# Patient Record
Sex: FEMALE | Race: WHITE | NOT HISPANIC OR LATINO | Employment: OTHER | URBAN - METROPOLITAN AREA
[De-identification: names, ages, dates, MRNs, and addresses within clinical notes are randomized per-mention and may not be internally consistent; named-entity substitution may affect disease eponyms.]

---

## 2018-03-28 LAB
ABSOL LYMPHOCYTES (HISTORICAL): 1.1 K/UL (ref 0.5–4)
ALBUMIN SERPL BCP-MCNC: 4 G/DL (ref 3–5.2)
ALP SERPL-CCNC: 71 U/L (ref 43–122)
ALT SERPL W P-5'-P-CCNC: 22 U/L (ref 9–52)
AMORPHOUS MATERIAL (HISTORICAL): ABNORMAL
ANION GAP SERPL CALCULATED.3IONS-SCNC: 16 MMOL/L (ref 5–14)
AST SERPL W P-5'-P-CCNC: 24 U/L (ref 14–36)
BACTERIA UR QL AUTO: ABNORMAL
BASOPHILS # BLD AUTO: 0 % (ref 0–1)
BASOPHILS # BLD AUTO: 0 K/UL (ref 0–0.1)
BILIRUB SERPL-MCNC: 1.5 MG/DL
BILIRUB UR QL STRIP: NEGATIVE MG/DL
BUN SERPL-MCNC: 17 MG/DL (ref 5–25)
CALCIUM SERPL-MCNC: 9.8 MG/DL (ref 8.4–10.2)
CASTS/CASTS TYPE (HISTORICAL): ABNORMAL /LPF
CHLORIDE SERPL-SCNC: 104 MEQ/L (ref 97–108)
CK SERPL-CCNC: 116 U/L (ref 30–135)
CK SERPL-CCNC: 117 U/L (ref 30–135)
CK SERPL-CCNC: 135 U/L (ref 30–135)
CLARITY UR: CLEAR
CO2 SERPL-SCNC: 19 MMOL/L (ref 22–30)
COLOR UR: YELLOW
CREATINE KINASE-MB FRACTION (HISTORICAL): 3.71 NG/ML (ref 0–2.37)
CREATINE KINASE-MB FRACTION (HISTORICAL): 3.83 NG/ML (ref 0–2.37)
CREATINE, SERUM (HISTORICAL): 0.67 MG/DL (ref 0.6–1.2)
CRYSTAL TYPE (HISTORICAL): ABNORMAL /HPF
DEPRECATED RDW RBC AUTO: 13.9 %
EGFR (HISTORICAL): >60 ML/MIN/1.73 M2
EOSINOPHIL # BLD AUTO: 0 K/UL (ref 0–0.4)
EOSINOPHIL NFR BLD AUTO: 0 % (ref 0–6)
GLUCOSE SERPL-MCNC: 95 MG/DL (ref 70–99)
GLUCOSE UR STRIP-MCNC: NEGATIVE MG/DL
HCT VFR BLD AUTO: 39.6 % (ref 36–46)
HGB BLD-MCNC: 13.8 G/DL (ref 12–16)
HGB UR QL STRIP.AUTO: NEGATIVE
KETONES UR STRIP-MCNC: >=150 MG/DL
LACTATE SERPL-SCNC: 1 MMOL/L (ref 0.7–2.1)
LEUKOCYTE ESTERASE UR QL STRIP: ABNORMAL
LIPASE SERPL-CCNC: 17 U/L (ref 23–300)
LYMPHOCYTES NFR BLD AUTO: 10 % (ref 25–45)
MCH RBC QN AUTO: 32.7 PG (ref 26–34)
MCHC RBC AUTO-ENTMCNC: 34.8 % (ref 31–36)
MCV RBC AUTO: 94 FL (ref 80–100)
MONOCYTES # BLD AUTO: 0.9 K/UL (ref 0.2–0.9)
MONOCYTES NFR BLD AUTO: 8 % (ref 1–10)
MUCOUS THREADS URNS QL MICRO: ABNORMAL
NEUTROPHILS ABS COUNT (HISTORICAL): 9.6 K/UL (ref 1.8–7.8)
NEUTS SEG NFR BLD AUTO: 82 % (ref 45–65)
NITRITE UR QL STRIP: NEGATIVE
NON-SQ EPI CELLS URNS QL MICRO: ABNORMAL
OTHER STN SPEC: ABNORMAL
PH UR STRIP.AUTO: 6 [PH] (ref 4.5–8)
PLATELET # BLD AUTO: 260 K/MCL (ref 150–450)
POTASSIUM SERPL-SCNC: 3.9 MEQ/L (ref 3.6–5)
PROT UR STRIP-MCNC: 15 MG/DL
RBC # BLD AUTO: 4.21 M/MCL (ref 4–5.2)
RBC #/AREA URNS AUTO: ABNORMAL /HPF
SODIUM SERPL-SCNC: 140 MEQ/L (ref 137–147)
SP GR UR STRIP.AUTO: 1.03 (ref 1–1.04)
TOTAL PROTEIN (HISTORICAL): 7 G/DL (ref 5.9–8.4)
TROPONIN I SERPL-MCNC: 0.02 NG/ML (ref 0–0.03)
TROPONIN I SERPL-MCNC: 0.03 NG/ML (ref 0–0.03)
UROBILINOGEN UR QL STRIP.AUTO: NEGATIVE MG/DL (ref 0–1)
WBC # BLD AUTO: 11.7 K/MCL (ref 4.5–11)
WBC #/AREA URNS AUTO: ABNORMAL /HPF

## 2018-03-29 LAB
ABSOL LYMPHOCYTES (HISTORICAL): 0.9 K/UL (ref 0.5–4)
ANION GAP SERPL CALCULATED.3IONS-SCNC: 9 MMOL/L (ref 5–14)
BASOPHILS # BLD AUTO: 0 % (ref 0–1)
BASOPHILS # BLD AUTO: 0 K/UL (ref 0–0.1)
BUN SERPL-MCNC: 11 MG/DL (ref 5–25)
CALCIUM SERPL-MCNC: 8.6 MG/DL (ref 8.4–10.2)
CHLORIDE SERPL-SCNC: 108 MEQ/L (ref 97–108)
CO2 SERPL-SCNC: 21 MMOL/L (ref 22–30)
CREATINE, SERUM (HISTORICAL): 0.59 MG/DL (ref 0.6–1.2)
DEPRECATED RDW RBC AUTO: 13.9 %
EGFR (HISTORICAL): >60 ML/MIN/1.73 M2
EOSINOPHIL # BLD AUTO: 0.1 K/UL (ref 0–0.4)
EOSINOPHIL NFR BLD AUTO: 2 % (ref 0–6)
GLUCOSE SERPL-MCNC: 81 MG/DL (ref 70–99)
HCT VFR BLD AUTO: 32.1 % (ref 36–46)
HGB BLD-MCNC: 11.1 G/DL (ref 12–16)
LYMPHOCYTES NFR BLD AUTO: 14 % (ref 25–45)
MAGNESIUM SERPL-MCNC: 2 MG/DL (ref 1.6–2.3)
MCH RBC QN AUTO: 32.7 PG (ref 26–34)
MCHC RBC AUTO-ENTMCNC: 34.6 % (ref 31–36)
MCV RBC AUTO: 95 FL (ref 80–100)
MONOCYTES # BLD AUTO: 0.7 K/UL (ref 0.2–0.9)
MONOCYTES NFR BLD AUTO: 11 % (ref 1–10)
NEUTROPHILS ABS COUNT (HISTORICAL): 4.7 K/UL (ref 1.8–7.8)
NEUTS SEG NFR BLD AUTO: 73 % (ref 45–65)
PLATELET # BLD AUTO: 176 K/MCL (ref 150–450)
POTASSIUM SERPL-SCNC: 3.4 MEQ/L (ref 3.6–5)
RBC # BLD AUTO: 3.4 M/MCL (ref 4–5.2)
SODIUM SERPL-SCNC: 138 MEQ/L (ref 137–147)
WBC # BLD AUTO: 6.4 K/MCL (ref 4.5–11)

## 2018-03-30 LAB
ABSOL LYMPHOCYTES (HISTORICAL): 1 K/UL (ref 0.5–4)
ANION GAP SERPL CALCULATED.3IONS-SCNC: 7 MMOL/L (ref 5–14)
BASOPHILS # BLD AUTO: 0 K/UL (ref 0–0.1)
BASOPHILS # BLD AUTO: 1 % (ref 0–1)
BUN SERPL-MCNC: 11 MG/DL (ref 5–25)
CALCIUM SERPL-MCNC: 9 MG/DL (ref 8.4–10.2)
CHLORIDE SERPL-SCNC: 107 MEQ/L (ref 97–108)
CHOLEST SERPL-MCNC: 122 MG/DL
CHOLEST/HDLC SERPL: 2.8 {RATIO}
CO2 SERPL-SCNC: 25 MMOL/L (ref 22–30)
CREATINE, SERUM (HISTORICAL): 0.65 MG/DL (ref 0.6–1.2)
DEPRECATED RDW RBC AUTO: 14.4 %
EGFR (HISTORICAL): >60 ML/MIN/1.73 M2
EOSINOPHIL # BLD AUTO: 0.2 K/UL (ref 0–0.4)
EOSINOPHIL NFR BLD AUTO: 2 % (ref 0–6)
GLUCOSE FASTING (HISTORICAL): 131 MG/DL (ref 70–99)
HCT VFR BLD AUTO: 32.4 % (ref 36–46)
HDLC SERPL-MCNC: 44 MG/DL
HGB BLD-MCNC: 11.3 G/DL (ref 12–16)
LDL/HDL RATIO (HISTORICAL): 1.4
LDLC SERPL CALC-MCNC: 61 MG/DL
LYMPHOCYTES NFR BLD AUTO: 15 % (ref 25–45)
MCH RBC QN AUTO: 32.7 PG (ref 26–34)
MCHC RBC AUTO-ENTMCNC: 35 % (ref 31–36)
MCV RBC AUTO: 94 FL (ref 80–100)
MONOCYTES # BLD AUTO: 0.7 K/UL (ref 0.2–0.9)
MONOCYTES NFR BLD AUTO: 11 % (ref 1–10)
NEUTROPHILS ABS COUNT (HISTORICAL): 4.7 K/UL (ref 1.8–7.8)
NEUTS SEG NFR BLD AUTO: 71 % (ref 45–65)
PLATELET # BLD AUTO: 186 K/MCL (ref 150–450)
POTASSIUM SERPL-SCNC: 4.2 MEQ/L (ref 3.6–5)
RBC # BLD AUTO: 3.46 M/MCL (ref 4–5.2)
SODIUM SERPL-SCNC: 139 MEQ/L (ref 137–147)
TRIGL SERPL-MCNC: 87 MG/DL
VLDLC SERPL CALC-MCNC: 17 MG/DL (ref 0–40)
WBC # BLD AUTO: 6.7 K/MCL (ref 4.5–11)

## 2018-03-31 LAB
ABSOL LYMPHOCYTES (HISTORICAL): 1.1 K/UL (ref 0.5–4)
ALBUMIN SERPL BCP-MCNC: 3.1 G/DL (ref 3–5.2)
ALP SERPL-CCNC: 51 U/L (ref 43–122)
ALT SERPL W P-5'-P-CCNC: 28 U/L (ref 9–52)
ANION GAP SERPL CALCULATED.3IONS-SCNC: 8 MMOL/L (ref 5–14)
AST SERPL W P-5'-P-CCNC: 18 U/L (ref 14–36)
BASOPHILS # BLD AUTO: 0 K/UL (ref 0–0.1)
BASOPHILS # BLD AUTO: 1 % (ref 0–1)
BILIRUB SERPL-MCNC: 0.2 MG/DL
BUN SERPL-MCNC: 13 MG/DL (ref 5–25)
CALCIUM SERPL-MCNC: 8.9 MG/DL (ref 8.4–10.2)
CHLORIDE SERPL-SCNC: 105 MEQ/L (ref 97–108)
CO2 SERPL-SCNC: 27 MMOL/L (ref 22–30)
CREATINE, SERUM (HISTORICAL): 0.53 MG/DL (ref 0.6–1.2)
DEPRECATED RDW RBC AUTO: 13.8 %
EGFR (HISTORICAL): >60 ML/MIN/1.73 M2
EOSINOPHIL # BLD AUTO: 0.1 K/UL (ref 0–0.4)
EOSINOPHIL NFR BLD AUTO: 2 % (ref 0–6)
GLUCOSE SERPL-MCNC: 121 MG/DL (ref 70–99)
HCT VFR BLD AUTO: 33.5 % (ref 36–46)
HGB BLD-MCNC: 11.6 G/DL (ref 12–16)
LYMPHOCYTES NFR BLD AUTO: 24 % (ref 25–45)
MCH RBC QN AUTO: 32.6 PG (ref 26–34)
MCHC RBC AUTO-ENTMCNC: 34.8 % (ref 31–36)
MCV RBC AUTO: 94 FL (ref 80–100)
MONOCYTES # BLD AUTO: 0.5 K/UL (ref 0.2–0.9)
MONOCYTES NFR BLD AUTO: 10 % (ref 1–10)
NEUTROPHILS ABS COUNT (HISTORICAL): 3 K/UL (ref 1.8–7.8)
NEUTS SEG NFR BLD AUTO: 63 % (ref 45–65)
PLATELET # BLD AUTO: 208 K/MCL (ref 150–450)
POTASSIUM SERPL-SCNC: 4 MEQ/L (ref 3.6–5)
RBC # BLD AUTO: 3.57 M/MCL (ref 4–5.2)
SODIUM SERPL-SCNC: 140 MEQ/L (ref 137–147)
TOTAL PROTEIN (HISTORICAL): 5.7 G/DL (ref 5.9–8.4)
WBC # BLD AUTO: 4.7 K/MCL (ref 4.5–11)

## 2018-07-09 ENCOUNTER — TRANSCRIBE ORDERS (OUTPATIENT)
Dept: ADMINISTRATIVE | Facility: HOSPITAL | Age: 83
End: 2018-07-09

## 2018-07-09 DIAGNOSIS — R00.1 BRADYCARDIA: Primary | ICD-10-CM

## 2018-07-09 DIAGNOSIS — M25.562 LEFT KNEE PAIN, UNSPECIFIED CHRONICITY: ICD-10-CM

## 2018-07-09 DIAGNOSIS — R79.89 LOW VITAMIN D LEVEL: Primary | ICD-10-CM

## 2018-07-09 DIAGNOSIS — M25.561 RIGHT KNEE PAIN, UNSPECIFIED CHRONICITY: ICD-10-CM

## 2018-07-09 RX ORDER — CHOLECALCIFEROL (VITAMIN D3) 25 MCG
CAPSULE ORAL
Qty: 30 CAPSULE | Refills: 2 | Status: SHIPPED | OUTPATIENT
Start: 2018-07-09 | End: 2018-10-10 | Stop reason: SDUPTHER

## 2018-07-16 ENCOUNTER — HOSPITAL ENCOUNTER (OUTPATIENT)
Dept: NON INVASIVE DIAGNOSTICS | Facility: HOSPITAL | Age: 83
Discharge: HOME | End: 2018-07-16
Payer: MEDICARE

## 2018-07-16 DIAGNOSIS — R00.1 BRADYCARDIA: ICD-10-CM

## 2018-07-16 PROCEDURE — 93225 XTRNL ECG REC<48 HRS REC: CPT

## 2018-07-16 PROCEDURE — 93226 XTRNL ECG REC<48 HR SCAN A/R: CPT

## 2018-07-20 ENCOUNTER — HOSPITAL ENCOUNTER (OUTPATIENT)
Dept: RADIOLOGY | Facility: HOSPITAL | Age: 83
Discharge: HOME | End: 2018-07-20
Payer: MEDICARE

## 2018-07-20 DIAGNOSIS — M25.561 RIGHT KNEE PAIN, UNSPECIFIED CHRONICITY: ICD-10-CM

## 2018-07-20 DIAGNOSIS — R00.1 BRADYCARDIA: ICD-10-CM

## 2018-07-20 DIAGNOSIS — M25.562 LEFT KNEE PAIN, UNSPECIFIED CHRONICITY: ICD-10-CM

## 2018-07-20 PROCEDURE — 73562 X-RAY EXAM OF KNEE 3: CPT

## 2018-07-20 PROCEDURE — 72110 X-RAY EXAM L-2 SPINE 4/>VWS: CPT

## 2018-07-20 PROCEDURE — 93227 XTRNL ECG REC<48 HR R&I: CPT | Performed by: INTERNAL MEDICINE

## 2018-07-23 ENCOUNTER — TELEPHONE (OUTPATIENT)
Dept: FAMILY MEDICINE CLINIC | Facility: CLINIC | Age: 83
End: 2018-07-23

## 2018-07-23 DIAGNOSIS — R00.1 BRADYCARDIA, SEVERE SINUS: Primary | ICD-10-CM

## 2018-10-10 DIAGNOSIS — R79.89 LOW VITAMIN D LEVEL: ICD-10-CM

## 2018-10-10 RX ORDER — CHOLECALCIFEROL (VITAMIN D3) 25 MCG
CAPSULE ORAL
Qty: 30 CAPSULE | Refills: 2 | Status: SHIPPED | OUTPATIENT
Start: 2018-10-10 | End: 2019-01-16 | Stop reason: SDUPTHER

## 2018-10-18 DIAGNOSIS — R10.13 EPIGASTRIC PAIN: Primary | ICD-10-CM

## 2018-10-18 RX ORDER — PANTOPRAZOLE SODIUM 40 MG/1
TABLET, DELAYED RELEASE ORAL
COMMUNITY
Start: 2018-01-18 | End: 2018-10-18 | Stop reason: SDUPTHER

## 2018-10-18 RX ORDER — PANTOPRAZOLE SODIUM 40 MG/1
40 TABLET, DELAYED RELEASE ORAL DAILY
Qty: 90 TABLET | Refills: 1 | Status: SHIPPED | OUTPATIENT
Start: 2018-10-18 | End: 2018-12-13 | Stop reason: HOSPADM

## 2018-10-19 DIAGNOSIS — I10 ESSENTIAL HYPERTENSION: Primary | ICD-10-CM

## 2018-10-19 RX ORDER — AMLODIPINE BESYLATE 2.5 MG/1
2.5 TABLET ORAL DAILY
Refills: 3 | COMMUNITY
Start: 2018-09-10 | End: 2018-10-19 | Stop reason: SDUPTHER

## 2018-10-19 RX ORDER — AMLODIPINE BESYLATE 2.5 MG/1
2.5 TABLET ORAL DAILY
Qty: 90 TABLET | Refills: 1 | Status: SHIPPED | OUTPATIENT
Start: 2018-10-19 | End: 2018-12-13 | Stop reason: HOSPADM

## 2018-12-10 ENCOUNTER — TELEPHONE (OUTPATIENT)
Dept: OTHER | Facility: OTHER | Age: 83
End: 2018-12-10

## 2018-12-10 ENCOUNTER — HOSPITAL ENCOUNTER (INPATIENT)
Facility: HOSPITAL | Age: 83
LOS: 2 days | DRG: 069 | End: 2018-12-13
Attending: EMERGENCY MEDICINE | Admitting: INTERNAL MEDICINE
Payer: MEDICARE

## 2018-12-10 ENCOUNTER — APPOINTMENT (EMERGENCY)
Dept: CT IMAGING | Facility: HOSPITAL | Age: 83
DRG: 069 | End: 2018-12-10
Payer: MEDICARE

## 2018-12-10 DIAGNOSIS — I16.0 HYPERTENSIVE URGENCY: ICD-10-CM

## 2018-12-10 DIAGNOSIS — K21.9 GASTROESOPHAGEAL REFLUX DISEASE, ESOPHAGITIS PRESENCE NOT SPECIFIED: ICD-10-CM

## 2018-12-10 DIAGNOSIS — G45.9 TRANSIENT ISCHEMIC ATTACK (TIA): ICD-10-CM

## 2018-12-10 DIAGNOSIS — R47.01 APHASIA: ICD-10-CM

## 2018-12-10 DIAGNOSIS — R41.0 CONFUSION: Primary | ICD-10-CM

## 2018-12-10 DIAGNOSIS — G45.9 TRANSIENT CEREBRAL ISCHEMIA, UNSPECIFIED TYPE: ICD-10-CM

## 2018-12-10 LAB
ALBUMIN SERPL BCP-MCNC: 4.6 G/DL (ref 3–5.2)
ALP SERPL-CCNC: 74 U/L (ref 43–122)
ALT SERPL W P-5'-P-CCNC: 19 U/L (ref 9–52)
AMPHETAMINES SERPL QL SCN: NEGATIVE
ANION GAP SERPL CALCULATED.3IONS-SCNC: 11 MMOL/L (ref 5–14)
APTT PPP: 23 SECONDS (ref 23–34)
AST SERPL W P-5'-P-CCNC: 18 U/L (ref 14–36)
ATRIAL RATE: 76 BPM
BARBITURATES UR QL: NEGATIVE
BASOPHILS # BLD AUTO: 0 THOUSANDS/ΜL (ref 0–0.1)
BASOPHILS NFR BLD AUTO: 0 % (ref 0–1)
BENZODIAZ UR QL: NEGATIVE
BILIRUB SERPL-MCNC: 0.4 MG/DL
BILIRUB UR QL STRIP: NEGATIVE
BUN SERPL-MCNC: 12 MG/DL (ref 5–25)
CALCIUM SERPL-MCNC: 9.8 MG/DL (ref 8.4–10.2)
CHLORIDE SERPL-SCNC: 103 MMOL/L (ref 97–108)
CLARITY UR: CLEAR
CO2 SERPL-SCNC: 26 MMOL/L (ref 22–30)
COCAINE UR QL: NEGATIVE
COLOR UR: ABNORMAL
CREAT SERPL-MCNC: 0.66 MG/DL (ref 0.6–1.2)
EOSINOPHIL # BLD AUTO: 0 THOUSAND/ΜL (ref 0–0.4)
EOSINOPHIL NFR BLD AUTO: 0 % (ref 0–6)
ERYTHROCYTE [DISTWIDTH] IN BLOOD BY AUTOMATED COUNT: 13.7 %
ETHANOL SERPL-MCNC: <10 MG/DL (ref 0–10)
GFR SERPL CREATININE-BSD FRML MDRD: 79 ML/MIN/1.73SQ M
GLUCOSE SERPL-MCNC: 136 MG/DL (ref 70–99)
GLUCOSE UR STRIP-MCNC: ABNORMAL MG/DL
HCT VFR BLD AUTO: 46.2 % (ref 36–46)
HGB BLD-MCNC: 15.4 G/DL (ref 12–16)
HGB UR QL STRIP.AUTO: NEGATIVE
INR PPP: 1.04 (ref 0.89–1.1)
KETONES UR STRIP-MCNC: NEGATIVE MG/DL
LACTATE SERPL-SCNC: 2 MMOL/L (ref 0.7–2)
LEUKOCYTE ESTERASE UR QL STRIP: NEGATIVE
LYMPHOCYTES # BLD AUTO: 1.3 THOUSANDS/ΜL (ref 0.5–4)
LYMPHOCYTES NFR BLD AUTO: 17 % (ref 25–45)
MCH RBC QN AUTO: 32.7 PG (ref 26.8–34.3)
MCHC RBC AUTO-ENTMCNC: 33.4 G/DL (ref 31.4–37.4)
MCV RBC AUTO: 98 FL (ref 82–98)
METHADONE UR QL: NEGATIVE
MONOCYTES # BLD AUTO: 0.4 THOUSAND/ΜL (ref 0.2–0.9)
MONOCYTES NFR BLD AUTO: 6 % (ref 1–10)
NEUTROPHILS # BLD AUTO: 6.1 THOUSANDS/ΜL (ref 1.8–7.8)
NEUTS SEG NFR BLD AUTO: 77 % (ref 45–65)
NITRITE UR QL STRIP: NEGATIVE
NT-PROBNP SERPL-MCNC: 400 PG/ML (ref 0–299)
OPIATES UR QL SCN: NEGATIVE
P AXIS: 77 DEGREES
PCP UR QL: NEGATIVE
PH UR STRIP.AUTO: 6.5 [PH] (ref 4.5–8)
PLATELET # BLD AUTO: 276 THOUSANDS/UL (ref 150–450)
PMV BLD AUTO: 7 FL (ref 8.9–12.7)
POTASSIUM SERPL-SCNC: 3.5 MMOL/L (ref 3.6–5)
PR INTERVAL: 154 MS
PROT SERPL-MCNC: 8.2 G/DL (ref 5.9–8.4)
PROT UR STRIP-MCNC: NEGATIVE MG/DL
PROTHROMBIN TIME: 11 SECONDS (ref 9.5–11.6)
QRS AXIS: 8 DEGREES
QRSD INTERVAL: 84 MS
QT INTERVAL: 380 MS
QTC INTERVAL: 427 MS
RBC # BLD AUTO: 4.71 MILLION/UL (ref 4–5.2)
SODIUM SERPL-SCNC: 140 MMOL/L (ref 137–147)
SP GR UR STRIP.AUTO: 1.01 (ref 1–1.04)
T WAVE AXIS: 58 DEGREES
THC UR QL: NEGATIVE
TROPONIN I SERPL-MCNC: <0.01 NG/ML (ref 0–0.03)
UROBILINOGEN UA: NEGATIVE MG/DL
VENTRICULAR RATE: 76 BPM
WBC # BLD AUTO: 7.9 THOUSAND/UL (ref 4.31–10.16)

## 2018-12-10 PROCEDURE — 83880 ASSAY OF NATRIURETIC PEPTIDE: CPT | Performed by: EMERGENCY MEDICINE

## 2018-12-10 PROCEDURE — 85730 THROMBOPLASTIN TIME PARTIAL: CPT | Performed by: EMERGENCY MEDICINE

## 2018-12-10 PROCEDURE — 80320 DRUG SCREEN QUANTALCOHOLS: CPT | Performed by: EMERGENCY MEDICINE

## 2018-12-10 PROCEDURE — 93010 ELECTROCARDIOGRAM REPORT: CPT | Performed by: INTERNAL MEDICINE

## 2018-12-10 PROCEDURE — 85610 PROTHROMBIN TIME: CPT | Performed by: EMERGENCY MEDICINE

## 2018-12-10 PROCEDURE — 80307 DRUG TEST PRSMV CHEM ANLYZR: CPT | Performed by: EMERGENCY MEDICINE

## 2018-12-10 PROCEDURE — 83605 ASSAY OF LACTIC ACID: CPT | Performed by: EMERGENCY MEDICINE

## 2018-12-10 PROCEDURE — 96360 HYDRATION IV INFUSION INIT: CPT

## 2018-12-10 PROCEDURE — 99285 EMERGENCY DEPT VISIT HI MDM: CPT

## 2018-12-10 PROCEDURE — 99222 1ST HOSP IP/OBS MODERATE 55: CPT | Performed by: INTERNAL MEDICINE

## 2018-12-10 PROCEDURE — 84484 ASSAY OF TROPONIN QUANT: CPT | Performed by: EMERGENCY MEDICINE

## 2018-12-10 PROCEDURE — 93005 ELECTROCARDIOGRAM TRACING: CPT

## 2018-12-10 PROCEDURE — 80053 COMPREHEN METABOLIC PANEL: CPT | Performed by: EMERGENCY MEDICINE

## 2018-12-10 PROCEDURE — 70450 CT HEAD/BRAIN W/O DYE: CPT

## 2018-12-10 PROCEDURE — 85025 COMPLETE CBC W/AUTO DIFF WBC: CPT | Performed by: EMERGENCY MEDICINE

## 2018-12-10 PROCEDURE — 36415 COLL VENOUS BLD VENIPUNCTURE: CPT | Performed by: EMERGENCY MEDICINE

## 2018-12-10 RX ORDER — AMLODIPINE BESYLATE 5 MG/1
5 TABLET ORAL ONCE
Status: COMPLETED | OUTPATIENT
Start: 2018-12-10 | End: 2018-12-10

## 2018-12-10 RX ORDER — SODIUM CHLORIDE 9 MG/ML
50 INJECTION, SOLUTION INTRAVENOUS CONTINUOUS
Status: DISCONTINUED | OUTPATIENT
Start: 2018-12-10 | End: 2018-12-13

## 2018-12-10 RX ORDER — LOSARTAN POTASSIUM 50 MG/1
100 TABLET ORAL DAILY
Status: DISCONTINUED | OUTPATIENT
Start: 2018-12-11 | End: 2018-12-13 | Stop reason: HOSPADM

## 2018-12-10 RX ORDER — POTASSIUM CHLORIDE 750 MG/1
20 TABLET, EXTENDED RELEASE ORAL ONCE
Status: COMPLETED | OUTPATIENT
Start: 2018-12-10 | End: 2018-12-10

## 2018-12-10 RX ORDER — LABETALOL HYDROCHLORIDE 5 MG/ML
10 INJECTION, SOLUTION INTRAVENOUS EVERY 4 HOURS PRN
Status: DISCONTINUED | OUTPATIENT
Start: 2018-12-10 | End: 2018-12-11

## 2018-12-10 RX ORDER — AMLODIPINE BESYLATE 5 MG/1
5 TABLET ORAL DAILY
Status: DISCONTINUED | OUTPATIENT
Start: 2018-12-11 | End: 2018-12-12

## 2018-12-10 RX ORDER — ASPIRIN 81 MG/1
81 TABLET, CHEWABLE ORAL DAILY
Status: DISCONTINUED | OUTPATIENT
Start: 2018-12-11 | End: 2018-12-13 | Stop reason: HOSPADM

## 2018-12-10 RX ORDER — PANTOPRAZOLE SODIUM 40 MG/1
40 TABLET, DELAYED RELEASE ORAL
Status: DISCONTINUED | OUTPATIENT
Start: 2018-12-11 | End: 2018-12-13 | Stop reason: HOSPADM

## 2018-12-10 RX ORDER — AMLODIPINE BESYLATE 2.5 MG/1
2.5 TABLET ORAL DAILY
Status: DISCONTINUED | OUTPATIENT
Start: 2018-12-11 | End: 2018-12-10

## 2018-12-10 RX ORDER — ATORVASTATIN CALCIUM 40 MG/1
40 TABLET, FILM COATED ORAL EVERY EVENING
Status: DISCONTINUED | OUTPATIENT
Start: 2018-12-10 | End: 2018-12-13 | Stop reason: HOSPADM

## 2018-12-10 RX ORDER — ASPIRIN 81 MG/1
324 TABLET, CHEWABLE ORAL ONCE
Status: COMPLETED | OUTPATIENT
Start: 2018-12-10 | End: 2018-12-10

## 2018-12-10 RX ADMIN — AMLODIPINE BESYLATE 5 MG: 5 TABLET ORAL at 17:28

## 2018-12-10 RX ADMIN — SODIUM CHLORIDE 50 ML/HR: 9 INJECTION, SOLUTION INTRAVENOUS at 18:25

## 2018-12-10 RX ADMIN — POTASSIUM CHLORIDE 20 MEQ: 10 TABLET, EXTENDED RELEASE ORAL at 19:18

## 2018-12-10 RX ADMIN — ASPIRIN 81 MG 324 MG: 81 TABLET ORAL at 17:28

## 2018-12-10 RX ADMIN — SODIUM CHLORIDE 1000 ML: 9 INJECTION, SOLUTION INTRAVENOUS at 15:30

## 2018-12-10 RX ADMIN — LABETALOL HYDROCHLORIDE 10 MG: 5 INJECTION INTRAVENOUS at 20:27

## 2018-12-10 RX ADMIN — ATORVASTATIN CALCIUM 40 MG: 40 TABLET, FILM COATED ORAL at 19:18

## 2018-12-10 NOTE — ED NOTES
Patient assisted to BR to void  Patient place back to bed and belonging was done  patient daughter is to take all of the belonging home  Signature obtain       Saige Bowser  12/10/18 3463

## 2018-12-10 NOTE — H&P
History and Physical - [facility] Internal Medicine    Patient Information: Neysa Najjar 80 y o  female MRN: [id number]  Unit/Bed#: ED 03 Encounter: [id number]  Admitting Physician: Lamont Velázquez MD  PCP: Paola Morrissey MD  Date of Admission:  12/10/18    Assessment/Plan:  80-year-old female only significant history of hypertension this morning could not remember her per hours like she usually does and later during the day was speaking to her daughter around 12:15 p m  And daughter noted altered speech pattern patient earlier and had a sensation of a headache and that not feeling right  At time of presentation to the ED symptoms have resolved  Stroke alert was not called with resolution of her symptoms and normal exam   She had a markedly elevated BP of 200/110  Longstanding history of hypertension controlled with low dosage of amlodipine 2 5 mg daily and valsartan 160 mg a day she does not take aspirin  Does not take statin and does not know where cholesterol is  She denies ever having a significant visual disturbance nausea vomiting chest pain shortness of breath denies change in urine or bowel habits  She did state that since the passing of her  6 months ago she has been increasingly lonely and this weighs on her  She however states that she sleeps well and her appetite has not significantly changed although daughter states that she has been eating a lot of potato chips lately    Also last several days reported some urine urgency  Chemistries all normal except for borderline potassium pro  troponin 0 01 lactic acid 2 0/urinalysis within normal limits    Hospital Problem List:     Principal Problem:    Transient ischemic attack (TIA)  Active Problems:    Hypertensive urgency    Gastroesophageal reflux      Plan for the Primary Problem(s):  · Transient ischemic attack manifest by dysarthria and confusion hours before lasting approximating at 2 hr Resolved at time of presentation with NIH stroke scale of 0 on presentation not tPA candidate and also had hypertensive urgency contraindicating  She will receive a MRI of the brain and 2D echocardiogram and neurology consultation and be placed on aspirin and statin with neuro checks overnight  · Hypertensive urgency in setting with known history of essential hypertension, will increase dosage of amlodipine to 5 mg a day and p r n  Labetalol overnight with parameters    Plan for Additional Problems:   · Gastroesophageal reflux disease with history of H pylori but apparently refused antibiotic the eradication prior continues on PPI  · Depression? VTE Prophylaxis: Enoxaparin (Lovenox)  / sequential compression device   Code Status:  DNR DNI  POLST: There is no POLST form on file for this patient (pre-hospital)    Anticipated Length of Stay:  Patient will be admitted on an Observation basis with an anticipated length of stay of  less than 2 midnights  Justification for Hospital Stay:  Transient dysarthria    Total Time for Visit, including Counseling / Coordination of Care: 30 minutes  Greater than 50% of this total time spent on direct patient counseling and coordination of care  Chief Complaint:   Confusion change in speech pattern not noted on presentation to ED    History of Present Illness:    Chon Lopez is a 80 y o  female who presents with change in speech pattern is noted by daughter while she was on the phone at 12:15 p m  Today on arrival to ED symptoms have resolved may have had confusion hours preceding at home which she described her inability to remember her prior to that she usually does at around 9:00 a m  Ligia Hemphill Please see above set assessment for further details and systems review  Review of Systems:    Review of Systems   HENT: Negative  Eyes: Negative  Respiratory: Negative  Cardiovascular: Negative  Gastrointestinal: Negative  Endocrine: Negative  Genitourinary: Negative  Musculoskeletal: Negative  Skin: Negative  Allergic/Immunologic: Negative  Neurological: Positive for dizziness, weakness and headaches  Hematological: Negative  Psychiatric/Behavioral: Negative  Past Medical and Surgical History:     Past Medical History:   Diagnosis Date    GERD (gastroesophageal reflux disease)     H  pylori infection     Hypertension        Past Surgical History:   Procedure Laterality Date    CHOLECYSTECTOMY         Meds/Allergies:    Prior to Admission medications    Medication Sig Start Date End Date Taking? Authorizing Provider   amLODIPine (NORVASC) 2 5 mg tablet Take 1 tablet (2 5 mg total) by mouth daily 10/19/18   Dwayne Fisher MD   [employer] D3 1000 units capsule TAKE ONE CAPSULE BY MOUTH EVERY DAY 10/10/18   Dwayne Fisher MD   pantoprazole (PROTONIX) 40 mg tablet Take 1 tablet (40 mg total) by mouth daily 10/18/18   Dwayne Fisher MD     I have reviewed home medications with patient personally  Allergies: Allergies   Allergen Reactions    Azithromycin Diarrhea       Social History:     Marital Status:     Occupation:  Retired  Patient Pre-hospital Living Situation:  Lives alone  Patient Pre-hospital Level of Mobility:  Ambulatory  Patient Pre-hospital Diet Restrictions:  No restrictions  Substance Use History:   History   Alcohol Use No     History   Smoking Status    Never Smoker   Smokeless Tobacco    Never Used     History   Drug Use No       Family History:    non-contributory    Physical Exam:     Vitals:   Blood Pressure: (!) 186/86 (12/10/18 1723)  Pulse: 64 (12/10/18 1723)  Temperature: (!) 96 3 °F (35 7 °C) (12/10/18 1441)  Temp Source: Tympanic (12/10/18 1441)  Respirations: 18 (12/10/18 1723)  SpO2: 100 % (12/10/18 1723)       General appearance: alert, appears stated age and cooperative  Head: Normocephalic, without obvious abnormality, atraumatic  Lungs: clear to auscultation bilaterally  Heart: regular rate and rhythm  Abdomen: soft, non-tender; bowel sounds normal; no masses,  no organomegaly  Back: negative  Extremities: extremities normal, atraumatic, no cyanosis or edema  Neurologic: Grossly normal        Additional Data:     Lab Results: I have personally reviewed pertinent reports  Results from last 7 days  Lab Units 12/10/18  1520   WBC Thousand/uL 7 90   HEMOGLOBIN g/dL 15 4   HEMATOCRIT % 46 2*   PLATELETS Thousands/uL 276   NEUTROS PCT % 77*   LYMPHS PCT % 17*   MONOS PCT % 6   EOS PCT % 0       Results from last 7 days  Lab Units 12/10/18  1520   POTASSIUM mmol/L 3 5*   CHLORIDE mmol/L 103   CO2 mmol/L 26   BUN mg/dL 12   CREATININE mg/dL 0 66   CALCIUM mg/dL 9 8   ALK PHOS U/L 74   ALT U/L 19   AST U/L 18       Results from last 7 days  Lab Units 12/10/18  1520   INR  1 04       Imaging: I have personally reviewed pertinent reports  Ct Head Without Contrast    Result Date: 12/10/2018  Narrative: CT BRAIN - WITHOUT CONTRAST INDICATION:   confusion  COMPARISON:  None  TECHNIQUE:  CT examination of the brain was performed  In addition to axial images, coronal 2D reformatted images were created and submitted for interpretation  Radiation dose length product (DLP) for this visit:  674 mGy-cm   This examination, like all CT scans performed in the [facility], was performed utilizing techniques to minimize radiation dose exposure, including the use of iterative reconstruction and automated exposure control  IMAGE QUALITY:  Diagnostic  FINDINGS: PARENCHYMA: Decreased attenuation is noted in periventricular and subcortical white matter demonstrating an appearance that is statistically most likely to represent moderate microangiopathic change  No CT signs of acute infarction  No intracranial mass, mass effect or midline shift  No acute parenchymal hemorrhage  VENTRICLES AND EXTRA-AXIAL SPACES:  Ventricles and extra-axial CSF spaces are prominent commensurate with the degree of volume loss  No hydrocephalus  No acute extra-axial hemorrhage  VISUALIZED ORBITS AND PARANASAL SINUSES:  Unremarkable  CALVARIUM AND EXTRACRANIAL SOFT TISSUES:  Normal      Impression: No acute intracranial abnormality  Microangiopathic changes  Workstation performed: [id number]       EKG, Pathology, and Other Studies Reviewed on Admission:   · EKG:     Allscripts Records Reviewed: Yes     ** Please Note: Dragon 360 Dictation voice to text software may have been used in the creation of this document   **

## 2018-12-10 NOTE — ED NOTES
Patient assisted to BR to void and then back to bed  Patient is left comfortable at present       Lalo Bowser  12/10/18 4004

## 2018-12-10 NOTE — ED PROCEDURE NOTE
PROCEDURE  ECG 12 Lead Documentation  Date/Time: 12/10/2018 2:52 PM  Performed by: Padmini Topete by: Yary Rachel     ECG reviewed by me, the ED Provider: yes    Patient location:  ED  Previous ECG:     Previous ECG:  Compared to current    Similarity:  No change  Interpretation:     Interpretation: normal    Rate:     ECG rate assessment: bradycardic    Rhythm:     Rhythm: sinus rhythm and sinus bradycardia    Ectopy:     Ectopy: none    QRS:     QRS axis:  Normal    QRS intervals:  Normal  Conduction:     Conduction: normal    ST segments:     ST segments:  Normal  T waves:     T waves: normal           Mark Mariano DO  12/10/18 1452

## 2018-12-10 NOTE — SEPSIS NOTE
Sepsis Note   Beatriz Sparks 80 y o  female MRN: [id number]  Unit/Bed#: ED 03 Encounter: [id number]            Initial Sepsis Screening     [address] Name 12/10/18 8905                Is the patient's history suggestive of a new or worsening infection? No  -EE        Suspected source of infection          Are two or more of the following signs & symptoms of infection both present and new to the patient? (!)  Yes (Proceed)  -EE        Indicate SIRS criteria Hypothermia < 36C (96 8F)  -EE        If the answer is yes to both questions, suspicion of sepsis is present          If severe sepsis is present AND tissue hypoperfusion perists in the hour after fluid resuscitation or lactate > 4, the patient meets criteria for SEPTIC SHOCK          Are any of the following organ dysfunction criteria present within 6 hours of suspected infection and SIRS criteria that are NOT considered to be chronic conditions? No  -EE        Organ dysfunction          Date of presentation of severe sepsis          Time of presentation of severe sepsis          Tissue hypoperfusion persists in the hour after crystalloid fluid administration, evidenced, by either:          Was hypotension present within one hour of the conclusion of crystalloid fluid administration?           Date of presentation of septic shock          Time of presentation of septic shock            User Key  (r) = Recorded By, (t) = Taken By, (c) = Cosigned By    [address] Name Provider Type    RICHA Marrufo DO Physician

## 2018-12-10 NOTE — ED NOTES
Pt resting on liter with family at bedside  No distress noted  Continuous cardiac monitoring and pulse oximetry monitoring  Call bell within reach        Issac Dillard RN  12/10/18 0791

## 2018-12-10 NOTE — ED PROVIDER NOTES
History  Chief Complaint   Patient presents with    Altered Mental Status     Daughter reports pt had period of confusion at approx [phone] (via telephone)  Pt AAO x 3 at time of triage  Pt reports urinary urgency for a few days  History provided by:  Patient  Altered Mental Status   Presenting symptoms: confusion    Severity:  Moderate  Most recent episode: Today  Episode history:  Single  Duration:  1 hour  Timing:  Rare  Progression:  Resolved  Chronicity:  New  Associated symptoms: no abdominal pain, no fever, no headaches, no nausea, no rash and no weakness        Prior to Admission Medications   Prescriptions Last Dose Informant Patient Reported? Taking? [employer] D3 1000 units capsule   No No   Sig: TAKE ONE CAPSULE BY MOUTH EVERY DAY   amLODIPine (NORVASC) 2 5 mg tablet   No No   Sig: Take 1 tablet (2 5 mg total) by mouth daily   pantoprazole (PROTONIX) 40 mg tablet   No No   Sig: Take 1 tablet (40 mg total) by mouth daily      Facility-Administered Medications: None       Past Medical History:   Diagnosis Date    GERD (gastroesophageal reflux disease)     H  pylori infection     Hypertension        Past Surgical History:   Procedure Laterality Date    CHOLECYSTECTOMY         History reviewed  No pertinent family history  I have reviewed and agree with the history as documented  Social History   Substance Use Topics    Smoking status: Never Smoker    Smokeless tobacco: Never Used    Alcohol use No        Review of Systems   Constitutional: Negative for chills and fever  HENT: Negative for rhinorrhea, sore throat and trouble swallowing  Eyes: Negative for pain  Respiratory: Negative for cough, shortness of breath, wheezing and stridor  Cardiovascular: Negative for chest pain and leg swelling  Gastrointestinal: Negative for abdominal pain, diarrhea and nausea  Endocrine: Negative for polyuria  Genitourinary: Negative for dysuria, flank pain and urgency     Musculoskeletal: Negative for joint swelling, myalgias and neck stiffness  Skin: Negative for rash  Allergic/Immunologic: Negative for immunocompromised state  Neurological: Negative for dizziness, syncope, weakness, numbness and headaches  Psychiatric/Behavioral: Positive for confusion  Negative for suicidal ideas  All other systems reviewed and are negative  Physical Exam  Physical Exam   Constitutional: She is oriented to person, place, and time  She appears well-developed and well-nourished  HENT:   Head: Normocephalic and atraumatic  Eyes: Pupils are equal, round, and reactive to light  EOM are normal    Neck: Normal range of motion  Neck supple  Cardiovascular: Normal rate and regular rhythm  Exam reveals no friction rub  No murmur heard  Pulmonary/Chest: Breath sounds normal  No respiratory distress  She has no wheezes  She has no rales  Abdominal: Soft  Bowel sounds are normal  She exhibits no distension  There is no tenderness  Musculoskeletal: Normal range of motion  She exhibits no edema or tenderness  Neurological: She is alert and oriented to person, place, and time  GCS 15  AAOx4  No focal neuro deficits  CN II-XII intact  PERRL  EOMI  [place] Ingles No pronator drift   strength 5/5 bilaterally  B/L UE strength 5/5 throughout  Finger to nose normal  Cerebellar function normal  Ambulates without difficulty  B/L LE strength 5/5 throughout  Gross sensation to b/l upper and lower extremities intact  Skin: Skin is warm  No rash noted  Psychiatric: She has a normal mood and affect  Nursing note and vitals reviewed        Vital Signs  ED Triage Vitals   Temperature Pulse Respirations Blood Pressure SpO2   12/10/18 1441 12/10/18 1441 12/10/18 1441 12/10/18 1441 12/10/18 1441   (!) 96 3 °F (35 7 °C) 97 16 (!) 220/114 97 %      Temp Source Heart Rate Source Patient Position - Orthostatic VS BP Location FiO2 (%)   12/10/18 1441 12/10/18 1441 12/10/18 1441 12/10/18 1441 --   Tympanic Monitor Sitting Right arm       Pain Score       12/10/18 1541       No Pain           Vitals:    12/10/18 1441 12/10/18 1546 12/10/18 1637   BP: (!) 220/114 (!) 182/78 (!) 188/88   Pulse: 97 57    Patient Position - Orthostatic VS: Sitting Lying        Visual Acuity  Visual Acuity      Most Recent Value   L Pupil Size (mm)  6   R Pupil Size (mm)  6          ED Medications  Medications   aspirin chewable tablet 324 mg (not administered)   amLODIPine (NORVASC) tablet 5 mg (not administered)   sodium chloride 0 9 % bolus 1,000 mL (1,000 mL Intravenous New Bag 12/10/18 1530)       Diagnostic Studies  Results Reviewed     Procedure Component Value Units Date/Time    Rapid drug screen, urine [[id number]]  (Normal) Collected:  12/10/18 1532    Lab Status:  Final result Specimen:  Urine from Urine, Clean Catch Updated:  12/10/18 1607     Amph/Meth UR Negative     Barbiturate Ur Negative     Benzodiazepine Urine Negative     Cocaine Urine Negative     Methadone Urine Negative     Opiate Urine Negative     PCP Ur Negative     THC Urine Negative    Narrative:         FOR MEDICAL PURPOSES ONLY  IF CONFIRMATION NEEDED PLEASE CONTACT THE LAB WITHIN 5 DAYS      Drug Screen Cutoff Levels:  AMPHETAMINE/METHAMPHETAMINES  1000 ng/mL  BARBITURATES     200 ng/mL  BENZODIAZEPINES     200 ng/mL  COCAINE      300 ng/mL  METHADONE      300 ng/mL  OPIATES      300 ng/mL  PHENCYCLIDINE     25 ng/mL  THC       50 ng/mL    Troponin I [[id number]]  (Normal) Collected:  12/10/18 1520    Lab Status:  Final result Specimen:  Blood from Arm, Left Updated:  12/10/18 1554     Troponin I <0 01 ng/mL     UA w Reflex to Microscopic [[id number]]  (Abnormal) Collected:  12/10/18 1532    Lab Status:  Final result Specimen:  Urine from Urine, Clean Catch Updated:  12/10/18 1548     Color, UA Straw     Clarity, UA Clear     Specific Gravity, UA 1 010     pH, UA 6 5     Leukocytes, UA Negative     Nitrite, UA Negative     Protein, UA Negative mg/dl      Glucose, UA 50 (1/25%) (A) mg/dl      Ketones, UA Negative mg/dl      Bilirubin, UA Negative     Blood, UA Negative     UROBILINOGEN UA Negative mg/dL     B-type natriuretic peptide [[id number]]  (Abnormal) Collected:  12/10/18 1520    Lab Status:  Final result Specimen:  Blood from Arm, Left Updated:  12/10/18 1548     NT-proBNP 400 (H) pg/mL     Comprehensive metabolic panel [[id number]]  (Abnormal) Collected:  12/10/18 1520    Lab Status:  Final result Specimen:  Blood from Arm, Left Updated:  12/10/18 1545     Sodium 140 mmol/L      Potassium 3 5 (L) mmol/L      Chloride 103 mmol/L      CO2 26 mmol/L      ANION GAP 11 mmol/L      BUN 12 mg/dL      Creatinine 0 66 mg/dL      Glucose 136 (H) mg/dL      Calcium 9 8 mg/dL      AST 18 U/L      ALT 19 U/L      Alkaline Phosphatase 74 U/L      Total Protein 8 2 g/dL      Albumin 4 6 g/dL      Total Bilirubin 0 40 mg/dL      eGFR 79 ml/min/1 73sq m     Narrative:         National Kidney Disease Education Program recommendations are as follows:  GFR calculation is accurate only with a steady state creatinine  Chronic Kidney disease less than 60 ml/min/1 73 sq  meters  Kidney failure less than 15 ml/min/1 73 sq  meters  Ethanol [[id number]]  (Normal) Collected:  12/10/18 1520    Lab Status:  Final result Specimen:  Blood from Arm, Left Updated:  12/10/18 1542     Ethanol Lvl <10 mg/dL     Lactic acid, plasma [[id number]]  (Normal) Collected:  12/10/18 1520    Lab Status:  Final result Specimen:  Blood from Arm, Left Updated:  12/10/18 1540     LACTIC ACID 2 0 mmol/L     Narrative:         Result may be elevated if tourniquet was used during collection      APTT [[id number]]  (Normal) Collected:  12/10/18 1520    Lab Status:  Final result Specimen:  Blood from Arm, Left Updated:  12/10/18 1540     PTT 23 seconds     Narrative:       PTT:      Protime-INR [[id number]]  (Normal) Collected:  12/10/18 1520    Lab Status:  Final result Specimen:  Blood from Arm, Left Updated:  12/10/18 1540 Protime 11 0 seconds      INR 1 04    Narrative:       INR:  ,PROTIME:      CBC and differential [[id number]]  (Abnormal) Collected:  12/10/18 1520    Lab Status:  Final result Specimen:  Blood from Arm, Left Updated:  12/10/18 1533     WBC 7 90 Thousand/uL      RBC 4 71 Million/uL      Hemoglobin 15 4 g/dL      Hematocrit 46 2 (H) %      MCV 98 fL      MCH 32 7 pg      MCHC 33 4 g/dL      RDW 13 7 %      MPV 7 0 (L) fL      Platelets 141 Thousands/uL      Neutrophils Relative 77 (H) %      Lymphocytes Relative 17 (L) %      Monocytes Relative 6 %      Eosinophils Relative 0 %      Basophils Relative 0 %      Neutrophils Absolute 6 10 Thousands/µL      Lymphocytes Absolute 1 30 Thousands/µL      Monocytes Absolute 0 40 Thousand/µL      Eosinophils Absolute 0 00 Thousand/µL      Basophils Absolute 0 00 Thousands/µL                  CT head without contrast   Final Result by Jessica Watt MD (12/10 1625)      No acute intracranial abnormality  Microangiopathic changes  Workstation performed: [id number]                    Procedures  Procedures       Phone Contacts  ED Phone Contact    ED Course                   Stroke Assessment     Row Name 12/10/18 1630             NIH Stroke Scale    Interval Baseline      Level of Consciousness (1a ) 0      LOC Questions (1b ) 0      LOC Commands (1c ) 0      Best Gaze (2 ) 0      Visual (3 ) 0      Facial Palsy (4 ) 0      Motor Arm, Left (5a ) 0      Motor Arm, Right (5b ) 0      Motor Leg, Left (6a ) 0      Motor Leg, Right (6b ) 0      Limb Ataxia (7 ) 0      Sensory (8 ) 0      Best Language (9 ) 0      Dysarthria (10 ) 0      Extinction and Inattention (11 ) (Formerly Neglect) 0      Total 0                First Filed Value   TPA Decision  Patient not a TPA candidate  Initial Sepsis Screening     Row Name 12/10/18 2391                Is the patient's history suggestive of a new or worsening infection?  No  -EE        Suspected source of infection          Are two or more of the following signs & symptoms of infection both present and new to the patient? (!)  Yes (Proceed)  -EE        Indicate SIRS criteria Hypothermia < 36C (96 8F)  -EE        If the answer is yes to both questions, suspicion of sepsis is present          If severe sepsis is present AND tissue hypoperfusion perists in the hour after fluid resuscitation or lactate > 4, the patient meets criteria for SEPTIC SHOCK          Are any of the following organ dysfunction criteria present within 6 hours of suspected infection and SIRS criteria that are NOT considered to be chronic conditions? No  -EE        Organ dysfunction          Date of presentation of severe sepsis          Time of presentation of severe sepsis          Tissue hypoperfusion persists in the hour after crystalloid fluid administration, evidenced, by either:          Was hypotension present within one hour of the conclusion of crystalloid fluid administration?         Date of presentation of septic shock          Time of presentation of septic shock            User Key  (r) = Recorded By, (t) = Taken By, (c) = Cosigned By    Initials Name Provider Type    EE [place], DO Physician                  MDM  Number of Diagnoses or Management Options  Aphasia: new and requires workup  Confusion: new and requires workup  Diagnosis management comments: SIRS criteria not indicative of sepsis     89F with aphasia lasting 2 hours, now resolved at this time  Bebe Stevens Possible TIA workup needed  Decision to admit to the hospital   No indication for stroke alert no indication for tPA  Symptoms resolved 2 hours prior to coming into the emergency department         Amount and/or Complexity of Data Reviewed  Clinical lab tests: reviewed and ordered  Tests in the radiology section of CPT®: ordered and reviewed  Review and summarize past medical records: yes  Independent visualization of images, tracings, or specimens: yes      CritCare Time    Disposition  Final diagnoses:   Confusion   Aphasia     Time reflects when diagnosis was documented in both MDM as applicable and the Disposition within this note     Time User Action Codes Description Comment    12/10/2018  4:39 PM Darol Buerger Add [R41 0] Confusion     12/10/2018  4:39 PM Darol Buerger Add [R47 01] Aphasia       ED Disposition     ED Disposition Condition Comment    Admit        Follow-up Information    None         Patient's Medications   Discharge Prescriptions    No medications on file     No discharge procedures on file      ED Provider  Electronically Signed by           Milton Prather DO  12/10/18 0072

## 2018-12-11 ENCOUNTER — APPOINTMENT (OUTPATIENT)
Dept: MRI IMAGING | Facility: HOSPITAL | Age: 83
DRG: 069 | End: 2018-12-11
Payer: MEDICARE

## 2018-12-11 PROBLEM — R73.9 HYPERGLYCEMIA: Status: ACTIVE | Noted: 2018-12-11

## 2018-12-11 LAB
ANION GAP SERPL CALCULATED.3IONS-SCNC: 6 MMOL/L (ref 5–14)
BUN SERPL-MCNC: 12 MG/DL (ref 5–25)
CALCIUM SERPL-MCNC: 9.2 MG/DL (ref 8.4–10.2)
CHLORIDE SERPL-SCNC: 108 MMOL/L (ref 97–108)
CHOLEST SERPL-MCNC: 155 MG/DL
CO2 SERPL-SCNC: 24 MMOL/L (ref 22–30)
CREAT SERPL-MCNC: 0.75 MG/DL (ref 0.6–1.2)
ERYTHROCYTE [DISTWIDTH] IN BLOOD BY AUTOMATED COUNT: 13.8 %
EST. AVERAGE GLUCOSE BLD GHB EST-MCNC: 120 MG/DL
GFR SERPL CREATININE-BSD FRML MDRD: 71 ML/MIN/1.73SQ M
GLUCOSE SERPL-MCNC: 115 MG/DL (ref 70–99)
HBA1C MFR BLD: 5.8 % (ref 4.2–6.3)
HCT VFR BLD AUTO: 38.5 % (ref 36–46)
HDLC SERPL-MCNC: 40 MG/DL (ref 40–59)
HGB BLD-MCNC: 13.1 G/DL (ref 12–16)
LDLC SERPL CALC-MCNC: 92 MG/DL
MCH RBC QN AUTO: 33 PG (ref 26.8–34.3)
MCHC RBC AUTO-ENTMCNC: 34 G/DL (ref 31.4–37.4)
MCV RBC AUTO: 97 FL (ref 82–98)
PLATELET # BLD AUTO: 248 THOUSANDS/UL (ref 150–450)
PMV BLD AUTO: 7.2 FL (ref 8.9–12.7)
POTASSIUM SERPL-SCNC: 4.4 MMOL/L (ref 3.6–5)
RBC # BLD AUTO: 3.97 MILLION/UL (ref 4–5.2)
SODIUM SERPL-SCNC: 138 MMOL/L (ref 137–147)
TRIGL SERPL-MCNC: 115 MG/DL
WBC # BLD AUTO: 6 THOUSAND/UL (ref 4.31–10.16)

## 2018-12-11 PROCEDURE — 80048 BASIC METABOLIC PNL TOTAL CA: CPT | Performed by: INTERNAL MEDICINE

## 2018-12-11 PROCEDURE — 85027 COMPLETE CBC AUTOMATED: CPT | Performed by: INTERNAL MEDICINE

## 2018-12-11 PROCEDURE — 99223 1ST HOSP IP/OBS HIGH 75: CPT | Performed by: PSYCHIATRY & NEUROLOGY

## 2018-12-11 PROCEDURE — A9585 GADOBUTROL INJECTION: HCPCS | Performed by: PSYCHIATRY & NEUROLOGY

## 2018-12-11 PROCEDURE — 70544 MR ANGIOGRAPHY HEAD W/O DYE: CPT

## 2018-12-11 PROCEDURE — 99232 SBSQ HOSP IP/OBS MODERATE 35: CPT | Performed by: INTERNAL MEDICINE

## 2018-12-11 PROCEDURE — 70553 MRI BRAIN STEM W/O & W/DYE: CPT

## 2018-12-11 PROCEDURE — 83036 HEMOGLOBIN GLYCOSYLATED A1C: CPT | Performed by: INTERNAL MEDICINE

## 2018-12-11 PROCEDURE — 70549 MR ANGIOGRAPH NECK W/O&W/DYE: CPT

## 2018-12-11 PROCEDURE — 80061 LIPID PANEL: CPT | Performed by: INTERNAL MEDICINE

## 2018-12-11 RX ORDER — ACETAMINOPHEN 325 MG/1
650 TABLET ORAL EVERY 6 HOURS PRN
Status: DISCONTINUED | OUTPATIENT
Start: 2018-12-11 | End: 2018-12-13 | Stop reason: HOSPADM

## 2018-12-11 RX ORDER — LABETALOL HYDROCHLORIDE 5 MG/ML
10 INJECTION, SOLUTION INTRAVENOUS EVERY 4 HOURS PRN
Status: DISCONTINUED | OUTPATIENT
Start: 2018-12-11 | End: 2018-12-13 | Stop reason: HOSPADM

## 2018-12-11 RX ADMIN — ATORVASTATIN CALCIUM 40 MG: 40 TABLET, FILM COATED ORAL at 17:24

## 2018-12-11 RX ADMIN — ASPIRIN 81 MG 81 MG: 81 TABLET ORAL at 08:48

## 2018-12-11 RX ADMIN — LOSARTAN POTASSIUM 100 MG: 50 TABLET, FILM COATED ORAL at 08:48

## 2018-12-11 RX ADMIN — ENOXAPARIN SODIUM 30 MG: 100 INJECTION SUBCUTANEOUS at 08:48

## 2018-12-11 RX ADMIN — AMLODIPINE BESYLATE 5 MG: 5 TABLET ORAL at 08:48

## 2018-12-11 RX ADMIN — GADOBUTROL 7 ML: 604.72 INJECTION INTRAVENOUS at 15:38

## 2018-12-11 RX ADMIN — PANTOPRAZOLE SODIUM 40 MG: 40 TABLET, DELAYED RELEASE ORAL at 05:32

## 2018-12-11 NOTE — SPEECH THERAPY NOTE
Speech Language/Pathology  Consult received and chart reviewed  Per chart review, pt passed RN dysphagia assessment and is tolerating regular diet with thin liquids with no s/s of aspiration  Per discussion with patient, speech/Language deficits are denied  Therefore, formal speech/swallow evaluation not indicated at this time   If new concerns arise, please reconsult

## 2018-12-11 NOTE — NURSING NOTE
Pt  Transported to MRI via wheelchair with transportation staff  Sons present in room  MRI checklist completed

## 2018-12-11 NOTE — ASSESSMENT & PLAN NOTE
· Continue the stroke pathway  · I appreciate Neurology's input  · Check an MRI of the brain and MRA of the head/neck  · Continue aspirin 81 mg PO Qdaily  · Continue high-intensity statin therapy with atorvastatin 40 mg PO Qdaily in the evening  · Check an echocardiogram  · Continue telemetry monitoring to check for any signs of atrial fibrillation/atrial flutter  · PT/OT    The patient now requires at least a 2-midnight hospitalization for short-term rehabilitation placement upon discharge  She will be made INPATIENT ADMISSION status

## 2018-12-11 NOTE — ED NOTES
Pt denies chest pain upon admission to , pt remains NSR on cardiac monitor      Fran Carey RN  12/10/18 6966

## 2018-12-11 NOTE — PROGRESS NOTES
Progress Note - Felipa Marsh 8/7/1929, 80 y o  female MRN: [id number]    Unit/Bed#:  -01 Encounter: [id number]    Primary Care Provider: Samuel Mayer MD   Date and time admitted to hospital: 12/10/2018  2:35 PM        * Transient cerebral ischemia   Assessment & Plan    · Continue the stroke pathway  · I appreciate Neurology's input  · Check an MRI of the brain and MRA of the head/neck  · Continue aspirin 81 mg PO Qdaily  · Continue high-intensity statin therapy with atorvastatin 40 mg PO Qdaily in the evening  · Check an echocardiogram  · Continue telemetry monitoring to check for any signs of atrial fibrillation/atrial flutter  · PT/OT    The patient now requires at least a 2-midnight hospitalization for short-term rehabilitation placement upon discharge  She will be made INPATIENT ADMISSION status  Hypertensive urgency   Assessment & Plan    · Improved  · Continue amlodipine and losartan  · PRN IV labetalol  · Follow the blood pressure trend closely     Hyperglycemia   Assessment & Plan    · Check a hemoglobin A1c level     Gastroesophageal reflux   Assessment & Plan    · Continue PPI treatment         VTE Pharmacologic Prophylaxis:   Pharmacologic: Enoxaparin (Lovenox)  Mechanical VTE Prophylaxis in Place: Yes    Discussions with Specialists or Other Care Team Provider:  I discussed the case with Dr Brynn Mcburney (Neurology)  I updated the patient's son at the bedside  Time Spent for Care: 30 minutes  More than 50% of total time spent on counseling and coordination of care as described above  Current Length of Stay: 0 day(s)    Current Patient Status: Inpatient   Certification Statement: The patient will continue to require additional inpatient hospital stay due to the need for additional neurologic work-up and the need for short-term rehabilitation placement upon discharge       The patient now requires at least a 2-midnight hospitalization for short-term rehabilitation placement upon discharge  She will be made INPATIENT ADMISSION status  Code Status: Level 3 - DNAR and DNI      Subjective: The patient was seen and examined  The patient is doing better  No speech difficulties  No chest pain  No shortness of breath  Objective:     Vitals:   Temp (24hrs), Av 4 °F (36 3 °C), Min:96 8 °F (36 °C), Max:98 1 °F (36 7 °C)    Temp:  [96 8 °F (36 °C)-98 1 °F (36 7 °C)] 98 1 °F (36 7 °C)  HR:  [49-70] 59  Resp:  [16-20] 20  BP: (123-203)/(53-88) 149/76  SpO2:  [97 %-100 %] 98 %  Body mass index is 20 28 kg/m²  Input and Output Summary (last 24 hours):        Intake/Output Summary (Last 24 hours) at 18 1455  Last data filed at 18 1100   Gross per 24 hour   Intake              600 ml   Output             1000 ml   Net             -400 ml       Physical Exam:     Physical Exam  General:  NAD, awake, alert, follows commands  HEENT:  NC/AT, mucous membranes moist  Neck:  Supple, No JVP elevation  CV:  + S1, + S2, RRR  Pulm:  Lung fields are CTA bilaterally  Abd:  Soft, Non-tender, Non-distended  Ext:  No clubbing/cyanosis/edema  Skin:  No rashes  Neuro:  No focal deficits      Additional Data:     Labs:      Results from last 7 days  Lab Units 18  0455 12/10/18  1520   WBC Thousand/uL 6 00 7 90   HEMOGLOBIN g/dL 13 1 15 4   HEMATOCRIT % 38 5 46 2*   PLATELETS Thousands/uL 248 276   NEUTROS PCT %  --  77*   LYMPHS PCT %  --  17*   MONOS PCT %  --  6   EOS PCT %  --  0       Results from last 7 days  Lab Units 18  0455 12/10/18  1520   SODIUM mmol/L 138 140   POTASSIUM mmol/L 4 4 3 5*   CHLORIDE mmol/L 108 103   CO2 mmol/L 24 26   BUN mg/dL 12 12   CREATININE mg/dL 0 75 0 66   ANION GAP mmol/L 6 11   CALCIUM mg/dL 9 2 9 8   ALBUMIN g/dL  --  4 6   TOTAL BILIRUBIN mg/dL  --  0 40   ALK PHOS U/L  --  74   ALT U/L  --  19   AST U/L  --  18   GLUCOSE RANDOM mg/dL 115* 136*       Results from last 7 days  Lab Units 12/10/18  1520   INR  1 04               Results from last 7 days  Lab Units 12/10/18  1520   LACTIC ACID mmol/L 2 0           * I Have Reviewed All Lab Data Listed Above  * Additional Pertinent Lab Tests Reviewed: [place] 66 Admission Reviewed      Recent Cultures (last 7 days):           Last 24 Hours Medication List:     Current Facility-Administered Medications:  acetaminophen 650 mg Oral Q6H PRN Lakeisha Oshea DO    amLODIPine 5 mg Oral Daily Daily Nicholson MD    aspirin 81 mg Oral Daily Daily Nicholson MD    atorvastatin 40 mg Oral QPM Daily Nicholson MD    [START ON 12/12/2018] enoxaparin 40 mg Subcutaneous Daily Lakeisha Oshea DO    labetalol 10 mg Intravenous Q4H PRN Lakeisha Oshea DO    losartan 100 mg Oral Daily Daily Nicholson MD    pantoprazole 40 mg Oral Early Morning Daily Nicholson MD    sodium chloride 50 mL/hr Intravenous Continuous Daily Nicholson MD Last Rate: Stopped (12/11/18 1403)        Today, Patient Was Seen By: Lakeisha Oshea DO    ** Please Note: Dictation voice to text software may have been used in the creation of this document   **

## 2018-12-11 NOTE — CONSULTS
Consultation - Neurology   Moris Max 80 y o  female MRN: [id number]  Unit/Bed#: 5T -01 Encounter: [id number]      Assessment/Plan   Assessment:  1  By historical account, transient aphasia  Given her elevated blood pressures on admission, could represent a hypertensive associated event  However, cannot rule out the potential for other origins  Will require additional study  2  Aspirin naive  3  Exaggerated gait impairment  Plan:  1  MRI brain  2  Head and neck MRA's  3  2D echocardiogram with bubble study  4  Daily 81 mg aspirin for now  5  Statin  6  Telemetry  7  Physical therapy for gait and balance  8  Appropriate blood pressure control as outlined by primary service  Discussed with Dr Arlene Mcginnis  Neurology remains available to advise  Reason for Consultation:  Transient episode of confusion and speech difficulties  HPI:   Moris Max is a 80 y o  right handed female who presents with two transient episode of described confusion and speech difficulty  Patient herself related awareness of the event which occurred late morning yesterday while she was saying her prayers  Suddenly, she found herself unable to say the words properly  This prompted her to call her daughter who reportedly stated that the words that she heard her mother speak on the phone were May Mirna and not representing the actual words themselves  Within approximately 15-20 minutes, however, the symptomatic issues apparently resolved as per patient  She did call her son shortly thereafter and he was present at bedside today to say that her speech and language function were normal when he spoke to her on the phone shortly after the symptoms resolved  Patient denies any prior history of similar symptoms  She has no past history of documented TIA or stroke or seizure  She was not taking and daily aspirin or any other platelet antagonists prior to admission        Review of Systems   Constitutional: Negative  HENT: Negative  Eyes: Negative  Respiratory: Negative  Cardiovascular: Negative  Gastrointestinal: Negative  Endocrine: Negative  Genitourinary: Negative  Musculoskeletal: Positive for gait problem  Chronic injury right shoulder area  Skin: Negative for rash  Allergic/Immunologic: Negative  Neurological: Positive for light-headedness  As above  Hematological: Negative  Psychiatric/Behavioral: Negative  Historical Information   Past Medical History:   Diagnosis Date    GERD (gastroesophageal reflux disease)     H  pylori infection     Hypertension      Past Surgical History:   Procedure Laterality Date    CHOLECYSTECTOMY       Social History    History reviewed  No pertinent family history  Allergies   Allergen Reactions    Azithromycin Diarrhea         PTA meds:   Prior to Admission Medications   Prescriptions Last Dose Informant Patient Reported? Taking? [facility] D3 1000 units capsule   No No   Sig: TAKE ONE CAPSULE BY MOUTH EVERY DAY   amLODIPine (NORVASC) 2 5 mg tablet   No No   Sig: Take 1 tablet (2 5 mg total) by mouth daily   pantoprazole (PROTONIX) 40 mg tablet   No No   Sig: Take 1 tablet (40 mg total) by mouth daily      Facility-Administered Medications: None         Objective   Vitals:Blood pressure 149/76, pulse 59, temperature 98 1 °F (36 7 °C), temperature source Temporal, resp  rate 20, height 5' 2" (1 575 m), weight 50 3 kg (110 lb 14 3 oz), SpO2 98 %  Physical Exam  Thin in appearance  Head normocephalic  No audible anterior neck bruits  Eyes nonicteric  Lungs clear to auscultation  Rhythm regular  GI (abdomen) soft nontender with bowel sounds present  No significant lower extremity edema  Neurologic Exam  Alert  Very pleasantly interactive  Some communication issues arose as result of patient's significant hearing difficulties    However, did appear to be fully oriented and appropriately conversational   No dysarthria  No obvious symbolic language difficulties in countered in general conversation  Gait apractic/arthritic in appearance and unsteady (apparently walker dependent over the past year or so but today with apparent advanced gait issues)  Cranial Nerves:   I: Olfactory sense intact bilaterally  II: Visual fields full to confrontation  Pupils equal, round, reactive to light with normal accomodation  Fundi were not well visualized  III,IV,VI: Extraocular muscles EOMI, no nystagmus  Mild bilateral ptosis present  V: [place] and pterygoid strength  Sensation in the V1 through V3 distributions intact to pinprick and light touch bilaterally  VII:  Very subtle right facial asymmetry noted  VIII:  Bilaterally hearing impaired  IX/X: Uvula midline  Soft palate elevation symmetric  XI: Trapezius and SCM strength 5/5 bilaterally  XII: Tongue midline with no atrophy or fasciculations with appropriate movement  Motor testing revealed no evident lateralized extremity weakness  However, she did have weakness limited to the right shoulder in a distribution characteristic of a rotator cuff injury  Sensory testing grossly intact to pin and position throughout  Muscle stretch reflexes bilaterally one throughout the upper extremities, bilaterally 1+ at the knees and bilaterally absent at the ankles  Toe response downgoing bilaterally      Lab Results:   CBC:   Results from last 7 days  Lab Units 12/11/18  0455 12/10/18  1520   WBC Thousand/uL 6 00 7 90   RBC Million/uL 3 97* 4 71   HEMOGLOBIN g/dL 13 1 15 4   HEMATOCRIT % 38 5 46 2*   MCV fL 97 98   PLATELETS Thousands/uL 248 276   , BMP/CMP:   Results from last 7 days  Lab Units 12/11/18  0455 12/10/18  1520   SODIUM mmol/L 138 140   POTASSIUM mmol/L 4 4 3 5*   CHLORIDE mmol/L 108 103   CO2 mmol/L 24 26   BUN mg/dL 12 12   CREATININE mg/dL 0 75 0 66   CALCIUM mg/dL 9 2 9 8   AST U/L  --  18   ALT U/L  --  19   ALK PHOS U/L  --  74   EGFR ml/min/1 73sq m 71 79   , Lipid Profile:   Results from last 7 days  Lab Units 12/11/18  0455   HDL mg/dL 40   LDL CALC mg/dL 92   TRIGLYCERIDES mg/dL 115     Imaging Studies: I have personally reviewed pertinent reports  and I have personally reviewed pertinent films in PACS    Counseling / Coordination of Care  I spent a total of 55 min with the patient with greater than 50% of that time spent counseling and coordinating her care, specifically discussing her diagnosis, additional tests, and discussing the case with her care team, as detailed above

## 2018-12-11 NOTE — UTILIZATION REVIEW
Initial Clinical Review    Admission: Date/Time/Statement: 12/10/18 @ 1644 OBSERVATION  CHANGED TO INPATIENT ON 12/11/18 @ 1445 FOR ADDITIONAL NEUROLOGIC WORKUP AND NEED FOR SHORT TERM REHAB PLACEMENT UPON D/C  Orders Placed This Encounter   Procedures    Place in Observation (expected length of stay for this patient is less than two midnights)     Standing Status:   Standing     Number of Occurrences:   1     Order Specific Question:   Admitting Physician     Answer:   Jaye An     Order Specific Question:   Level of Care     Answer:   Med Surg [16]         ED: Date/Time/Mode of Arrival:   ED Arrival Information     Expected Arrival Acuity Means of Arrival Escorted By Service Admission Type    - 12/10/2018 14:31 Emergent Wheelchair Self General Medicine Emergency    Arrival Complaint    high blood pressure/dizziness          Chief Complaint:   Chief Complaint   Patient presents with    Altered Mental Status     Daughter reports pt had period of confusion at approx [phone] (via telephone)  Pt AAO x 3 at time of triage  Pt reports urinary urgency for a few days  History of Illness:     42-year-old female presented to the ED after daughter noted altered speech pattern  Patient earlier had a sensation of a headache and not feeling right  At time of presentation to the ED symptoms have resolved        ED Vital Signs:   ED Triage Vitals   Temperature Pulse Respirations Blood Pressure SpO2   12/10/18 1441 12/10/18 1441 12/10/18 1441 12/10/18 1441 12/10/18 1441   (!) 96 3 °F (35 7 °C) 97 16 (!) 220/114 97 %      Temp Source Heart Rate Source Patient Position - Orthostatic VS BP Location FiO2 (%)   12/10/18 1441 12/10/18 1441 12/10/18 1441 12/10/18 1441 --   Tympanic Monitor Sitting Right arm       Pain Score       12/10/18 1541       No Pain        Wt Readings from Last 1 Encounters:   12/10/18 50 3 kg (110 lb 14 3 oz)       Vital Signs (abnormal):     12/10/18 2009    203/85     12/10/18 1918 184/84     12/10/18 1723    186/86     12/10/18 1637    188/88     12/10/18 1546    182/78         Abnormal Labs/Diagnostic Test Results:     CT head: no acute intracranial abnormality  EKG: Next appt: Today at 01:00 PM in Cardiology [facility] ECHO 1)    Ref Range & Units 12/10/18 1450   Ventricular Rate BPM 76    Atrial Rate BPM 76    IA Interval ms 154    QRSD Interval ms 84    QT Interval ms 380    QTC Interval ms 427    P [place] degrees 77    QRS Axis degrees 8    T Wave Axis degrees 58      Sinus rhythm with Premature atrial complexes  Otherwise normal ECG                Potassium = 3 5, Glucose = 136, proBNP = 400    ED Treatment:   Medication Administration from 12/10/2018 1431 to 12/10/2018 2002       Date/Time Order Dose Route Action Action by Comments     12/10/2018 1734 sodium chloride 0 9 % bolus 1,000 mL 0 mL Intravenous Stopped Antonia Benitez RN      12/10/2018 1530 sodium chloride 0 9 % bolus 1,000 mL 1,000 mL Intravenous New Bag Francia Ospina RN      12/10/2018 1728 aspirin chewable tablet 324 mg 324 mg Oral Given Antonia Benitez RN      12/10/2018 1728 amLODIPine (NORVASC) tablet 5 mg 5 mg Oral Given Francia Ospina RN      12/10/2018 1825 sodium chloride 0 9 % infusion 50 mL/hr Intravenous [address] German Hernandez RN      12/10/2018 1918 atorvastatin (LIPITOR) tablet 40 mg 40 mg Oral Given German Hernandez RN      12/10/2018 1918 potassium chloride (K-DUR,KLOR-CON) CR tablet 20 mEq 20 mEq Oral Given German Hernandez RN           Past Medical/Surgical History:    Active Ambulatory Problems     Diagnosis Date Noted    No Active Ambulatory Problems     Resolved Ambulatory Problems     Diagnosis Date Noted    No Resolved Ambulatory Problems     Past Medical History:   Diagnosis Date    GERD (gastroesophageal reflux disease)     H  pylori infection     Hypertension        Admitting Diagnosis: Aphasia [R47 01]  Confusion [R41 0]  Altered mental status [R41 82]  Transient ischemic attack (TIA) [G45 9]    Age/Sex: 80 y o  female    Assessment/Plan:      · Transient ischemic attack manifest by dysarthria and confusion hours before lasting approximating at 2 hr Resolved at time of presentation with NIH stroke scale of 0 on presentation not tPA candidate and also had hypertensive urgency contraindicating  She will receive a MRI of the brain and 2D echocardiogram and neurology consultation and be placed on aspirin and statin with neuro checks overnight  ? Hypertensive urgency in setting with known history of essential hypertension, will increase dosage of amlodipine to 5 mg a day and p r n  Labetalol overnight with parameters     Anticipated Length of Stay:  Patient will be admitted on an Observation basis with an anticipated length of stay of  less than 2 midnights  Justification for Hospital Stay:  Transient dysarthria  Admission Orders: Neurology consult, sequential compression device, PT/OT eval, Speech eval, MRI brain, ECHO, neuro checks Q1H x 4, Q2H x 4, then Q4H, Nursing dysphagia assessment prior to starting diet, Reassess NIH stroke scale, telemetry monitoring, up with assistance        Scheduled Meds:   Current Facility-Administered Medications:  amLODIPine 5 mg Oral Daily   aspirin 81 mg Oral Daily   atorvastatin 40 mg Oral QPM   enoxaparin 30 mg Subcutaneous Daily   labetalol 10 mg Intravenous Q4H PRN   losartan 100 mg Oral Daily   pantoprazole 40 mg Oral Early Morning   sodium chloride 50 mL/hr Intravenous Continuous     Continuous Infusions:   sodium chloride 50 mL/hr Last Rate: 50 mL/hr (12/10/18 7039)

## 2018-12-11 NOTE — NURSING NOTE
Patient sitting up in bed  Patient is absent from signs of distress and denies pain at this time  VS WDL, SR on the monitor  Patient encouraged to utilize call bell

## 2018-12-11 NOTE — TELEPHONE ENCOUNTER
[address] [facility] neurology (approximated)  CONFIDENTIALTY NOTICE: This fax transmission is intended only for the addressee  It contains information that is legally privileged,  confidential or otherwise protected from use or disclosure  If you are not the intended recipient, you are strictly prohibited from reviewing,  disclosing, copying using or disseminating any of this information or taking any action in reliance on or regarding this information  If you have  received this fax in error, please notify us immediately by telephone so that we can arrange for its return to us  Page:   Call Id: [phone] [facility] Call  Standard Call Report  Health Call  Patient Name: [place] [facility] neurology  Gender: Male  : (approximated)  Age:  Return Phone  Number: [phone] (Work)  Address:  City/State/Zip:  Practice Name: [address] [place] [address] Charged:  Physician:  Cam Mehta Name:  Relationship To  Patient:  Return Phone Number: Unavailable  Presenting Problem: Routine/ [facility] at [place] [address]  Service Type: [address] 1: [address] [facility] Name and  Number:  Nurse Assessment  Nurse: Bebo Ayers Date/Time: 12/10/2018 10:05:02 PM  Type of assessment required:  ---Consult  DateTime called Sean Newman Name  ---12/10/2018 @ 4/ Jerlean Phalen of appointment:  ---Routine Consult  Facility/Unit/Room Number/Unit Phone Number  ---[facility] at [facility] 5T/ [phone]  Practice consulted:  ---[facility]  Requesting physician:  ---Dr Suszanne Bamberger  Patient's name//Medical Record Number  ---Mary Stroud [id number]/ [id number]  Admitting diagnosis/Reason for consult  ---Confusion, Aphasia, TIA/ Confusion, Aphasia, TIA  Physician Notified/DateTime:  ---Dr Renetta Daniel 2018 @   [address] [place] [address] neurology (approximated)  CONFIDENTIALTY NOTICE: This fax transmission is intended only for the addressee   It contains information that is legally privileged,  confidential or otherwise protected from use or disclosure  If you are not the intended recipient, you are strictly prohibited from reviewing,  disclosing, copying using or disseminating any of this information or taking any action in reliance on or regarding this information  If you have  received this fax in error, please notify us immediately by telephone so that we can arrange for its return to us  Page: 2 of 2  Call Id: [id number]  Protocols  Protocol Title Nurse Date/Time  Disp   Time Disposition Final User  12/10/2018 10:16:27 PM Close Yes Josefina Maldonado

## 2018-12-11 NOTE — PLAN OF CARE
Problem: Potential for Falls  Goal: Patient will remain free of falls  INTERVENTIONS:  - Assess patient frequently for physical needs  -  Identify cognitive and physical deficits and behaviors that affect risk of falls    -  [place] fall precautions as indicated by assessment   - Educate patient/family on patient safety including physical limitations  - Instruct patient to call for assistance with activity based on assessment  - Modify environment to reduce risk of injury  - Consider OT/PT consult to assist with strengthening/mobility   Outcome: Progressing      Problem: SAFETY ADULT  Goal: Maintain or return to baseline ADL function  INTERVENTIONS:  -  Assess patient's ability to carry out ADLs; assess patient's baseline for ADL function and identify physical deficits which impact ability to perform ADLs (bathing, care of mouth/teeth, toileting, grooming, dressing, etc )  - Assess/evaluate cause of self-care deficits   - Assess range of motion  - Assess patient's mobility; develop plan if impaired  - Assess patient's need for assistive devices and provide as appropriate  - Encourage maximum independence but intervene and supervise when necessary  ¯ Involve family in performance of ADLs  ¯ Assess for home care needs following discharge   ¯ Request OT consult to assist with ADL evaluation and planning for discharge  ¯ Provide patient education as appropriate  Outcome: Progressing    Goal: Maintain or return mobility status to optimal level  INTERVENTIONS:  - Assess patient's baseline mobility status (ambulation, transfers, stairs, etc )    - Identify cognitive and physical deficits and behaviors that affect mobility  - Identify mobility aids required to assist with transfers and/or ambulation (gait belt, sit-to-stand, lift, walker, cane, etc )  - [place] fall precautions as indicated by assessment  - Record patient progress and toleration of activity level on Mobility SBAR; progress patient to next Phase/Stage  - Instruct patient to call for assistance with activity based on assessment  - Request Rehabilitation consult to assist with strengthening/weightbearing, etc   Outcome: Progressing      Problem: DISCHARGE PLANNING  Goal: Discharge to home or other facility with appropriate resources  INTERVENTIONS:  - Identify barriers to discharge w/patient and caregiver  - Arrange for needed discharge resources and transportation as appropriate  - Identify discharge learning needs (meds, wound care, etc )  - Arrange for interpretive services to assist at discharge as needed  - Refer to Case Management Department for coordinating discharge planning if the patient needs post-hospital services based on physician/advanced practitioner order or complex needs related to functional status, cognitive ability, or social support system  Outcome: Progressing      Problem: Knowledge Deficit  Goal: Patient/family/caregiver demonstrates understanding of disease process, treatment plan, medications, and discharge instructions  Complete learning assessment and assess knowledge base    Interventions:  - Provide teaching at level of understanding  - Provide teaching via preferred learning methods  Outcome: Progressing

## 2018-12-11 NOTE — SOCIAL WORK
Pt under observation status for a TIA and hypertensive urgency  Family reported that pt had an altered speech pattern, confusion, and headache lasting approx  2 hours  By the time pt presented to the ED symptoms had resolved  PT/OT/Speech and Neuro have been consulted  SW also requested Attending consult physiatry  SW met with pt to complete observation notification and assessment  Notice signed with original placed in scan bin  Pt declined copy  Pt reports that she lives alone in a 2 story house with 4+1 ALBER and 2nd floor set-up  Pt uses a RW to ambulate and has one on each floor of her home  In the community, pt uses a transport w/c  Pt reports to be independent with self-care tasks  Dtr assists with household management chores, laundry, and transportation  Pt denies any in-home services or SNF/hospital admissions within the last 60 days  Last rehab admission was to [facility] 03/2018  Pt denies any MH or substance use hx  PCP is Dr Suzan Hanley and preferred pharmacy is the [facility]  When medically cleared for discharge, pt's family can provide transportation home  No questions or concerns from pt at this time  SW will continue to follow for plan of care

## 2018-12-11 NOTE — ASSESSMENT & PLAN NOTE
· Improved  · Continue amlodipine and losartan  · PRN IV labetalol  · Follow the blood pressure trend closely

## 2018-12-11 NOTE — NURSING NOTE
Patient sitting up in bed, VS WDL & NSR on monitor  Neuro check completed, patient does not display any neuro symptoms at this time  Patient instructed to utilize call bell  Will continue to monitor

## 2018-12-11 NOTE — NURSING NOTE
No changes neurologically since admission  Pt denies any c/o  Remains in Sinus Bradycardia  Pt assisted to [facility]  No changes in assessment since admission  Call bell n reach, will continue to monitor

## 2018-12-12 ENCOUNTER — APPOINTMENT (INPATIENT)
Dept: NON INVASIVE DIAGNOSTICS | Facility: HOSPITAL | Age: 83
DRG: 069 | End: 2018-12-12
Payer: MEDICARE

## 2018-12-12 LAB
ALBUMIN SERPL BCP-MCNC: 3.5 G/DL (ref 3–5.2)
ALP SERPL-CCNC: 54 U/L (ref 43–122)
ALT SERPL W P-5'-P-CCNC: 20 U/L (ref 9–52)
ANION GAP SERPL CALCULATED.3IONS-SCNC: 7 MMOL/L (ref 5–14)
AST SERPL W P-5'-P-CCNC: 21 U/L (ref 14–36)
BASOPHILS # BLD AUTO: 0 THOUSANDS/ΜL (ref 0–0.1)
BASOPHILS NFR BLD AUTO: 1 % (ref 0–1)
BILIRUB SERPL-MCNC: 0.8 MG/DL
BUN SERPL-MCNC: 16 MG/DL (ref 5–25)
CALCIUM SERPL-MCNC: 9.1 MG/DL (ref 8.4–10.2)
CHLORIDE SERPL-SCNC: 108 MMOL/L (ref 97–108)
CK SERPL-CCNC: 39 U/L (ref 30–135)
CO2 SERPL-SCNC: 23 MMOL/L (ref 22–30)
CREAT SERPL-MCNC: 0.58 MG/DL (ref 0.6–1.2)
EOSINOPHIL # BLD AUTO: 0.1 THOUSAND/ΜL (ref 0–0.4)
EOSINOPHIL NFR BLD AUTO: 1 % (ref 0–6)
ERYTHROCYTE [DISTWIDTH] IN BLOOD BY AUTOMATED COUNT: 13.5 %
GFR SERPL CREATININE-BSD FRML MDRD: 82 ML/MIN/1.73SQ M
GLUCOSE SERPL-MCNC: 115 MG/DL (ref 70–99)
HCT VFR BLD AUTO: 38.5 % (ref 36–46)
HGB BLD-MCNC: 13 G/DL (ref 12–16)
LACTATE SERPL-SCNC: 1.2 MMOL/L (ref 0.7–2)
LYMPHOCYTES # BLD AUTO: 1.6 THOUSANDS/ΜL (ref 0.5–4)
LYMPHOCYTES NFR BLD AUTO: 28 % (ref 25–45)
MAGNESIUM SERPL-MCNC: 2.1 MG/DL (ref 1.6–2.3)
MCH RBC QN AUTO: 32.6 PG (ref 26.8–34.3)
MCHC RBC AUTO-ENTMCNC: 33.7 G/DL (ref 31.4–37.4)
MCV RBC AUTO: 97 FL (ref 82–98)
MONOCYTES # BLD AUTO: 0.5 THOUSAND/ΜL (ref 0.2–0.9)
MONOCYTES NFR BLD AUTO: 9 % (ref 1–10)
NEUTROPHILS # BLD AUTO: 3.5 THOUSANDS/ΜL (ref 1.8–7.8)
NEUTS SEG NFR BLD AUTO: 61 % (ref 45–65)
PHOSPHATE SERPL-MCNC: 4.1 MG/DL (ref 2.5–4.8)
PLATELET # BLD AUTO: 238 THOUSANDS/UL (ref 150–450)
PMV BLD AUTO: 7.5 FL (ref 8.9–12.7)
POTASSIUM SERPL-SCNC: 4.2 MMOL/L (ref 3.6–5)
PROCALCITONIN SERPL-MCNC: <0.05 NG/ML
PROT SERPL-MCNC: 6.6 G/DL (ref 5.9–8.4)
RBC # BLD AUTO: 3.98 MILLION/UL (ref 4–5.2)
SODIUM SERPL-SCNC: 138 MMOL/L (ref 137–147)
TROPONIN I SERPL-MCNC: <0.01 NG/ML (ref 0–0.03)
TSH SERPL DL<=0.05 MIU/L-ACNC: 1.98 UIU/ML (ref 0.47–4.68)
WBC # BLD AUTO: 5.8 THOUSAND/UL (ref 4.31–10.16)

## 2018-12-12 PROCEDURE — 84443 ASSAY THYROID STIM HORMONE: CPT | Performed by: INTERNAL MEDICINE

## 2018-12-12 PROCEDURE — 99232 SBSQ HOSP IP/OBS MODERATE 35: CPT | Performed by: INTERNAL MEDICINE

## 2018-12-12 PROCEDURE — 84484 ASSAY OF TROPONIN QUANT: CPT | Performed by: INTERNAL MEDICINE

## 2018-12-12 PROCEDURE — 93306 TTE W/DOPPLER COMPLETE: CPT

## 2018-12-12 PROCEDURE — 93306 TTE W/DOPPLER COMPLETE: CPT | Performed by: INTERNAL MEDICINE

## 2018-12-12 PROCEDURE — 84100 ASSAY OF PHOSPHORUS: CPT | Performed by: INTERNAL MEDICINE

## 2018-12-12 PROCEDURE — 99232 SBSQ HOSP IP/OBS MODERATE 35: CPT | Performed by: PSYCHIATRY & NEUROLOGY

## 2018-12-12 PROCEDURE — 80053 COMPREHEN METABOLIC PANEL: CPT | Performed by: INTERNAL MEDICINE

## 2018-12-12 PROCEDURE — G8981 BODY POS CURRENT STATUS: HCPCS

## 2018-12-12 PROCEDURE — 83605 ASSAY OF LACTIC ACID: CPT | Performed by: INTERNAL MEDICINE

## 2018-12-12 PROCEDURE — G8982 BODY POS GOAL STATUS: HCPCS

## 2018-12-12 PROCEDURE — 97163 PT EVAL HIGH COMPLEX 45 MIN: CPT

## 2018-12-12 PROCEDURE — 93799 UNLISTED CV SVC/PROCEDURE: CPT

## 2018-12-12 PROCEDURE — 85025 COMPLETE CBC W/AUTO DIFF WBC: CPT | Performed by: INTERNAL MEDICINE

## 2018-12-12 PROCEDURE — 82550 ASSAY OF CK (CPK): CPT | Performed by: INTERNAL MEDICINE

## 2018-12-12 PROCEDURE — 83735 ASSAY OF MAGNESIUM: CPT | Performed by: INTERNAL MEDICINE

## 2018-12-12 PROCEDURE — 84145 PROCALCITONIN (PCT): CPT | Performed by: INTERNAL MEDICINE

## 2018-12-12 RX ORDER — AMLODIPINE BESYLATE 5 MG/1
5 TABLET ORAL 2 TIMES DAILY
Status: DISCONTINUED | OUTPATIENT
Start: 2018-12-12 | End: 2018-12-13 | Stop reason: HOSPADM

## 2018-12-12 RX ORDER — CLOPIDOGREL BISULFATE 75 MG/1
75 TABLET ORAL DAILY
Status: DISCONTINUED | OUTPATIENT
Start: 2018-12-12 | End: 2018-12-13 | Stop reason: HOSPADM

## 2018-12-12 RX ADMIN — CLOPIDOGREL BISULFATE 75 MG: 75 TABLET ORAL at 15:18

## 2018-12-12 RX ADMIN — ASPIRIN 81 MG 81 MG: 81 TABLET ORAL at 08:45

## 2018-12-12 RX ADMIN — ENOXAPARIN SODIUM 40 MG: 40 INJECTION SUBCUTANEOUS at 08:45

## 2018-12-12 RX ADMIN — ATORVASTATIN CALCIUM 40 MG: 40 TABLET, FILM COATED ORAL at 17:26

## 2018-12-12 RX ADMIN — SODIUM CHLORIDE 50 ML/HR: 9 INJECTION, SOLUTION INTRAVENOUS at 17:27

## 2018-12-12 RX ADMIN — PANTOPRAZOLE SODIUM 40 MG: 40 TABLET, DELAYED RELEASE ORAL at 06:29

## 2018-12-12 RX ADMIN — AMLODIPINE BESYLATE 5 MG: 5 TABLET ORAL at 17:26

## 2018-12-12 RX ADMIN — LOSARTAN POTASSIUM 100 MG: 50 TABLET, FILM COATED ORAL at 08:45

## 2018-12-12 RX ADMIN — AMLODIPINE BESYLATE 5 MG: 5 TABLET ORAL at 08:45

## 2018-12-12 NOTE — PLAN OF CARE
Problem: PHYSICAL THERAPY ADULT  Goal: Performs mobility at highest level of function for planned discharge setting  See evaluation for individualized goals  Treatment/Interventions: ADL retraining, Functional transfer training, LE strengthening/ROM, Elevations, Therapeutic exercise, Endurance training, Equipment eval/education, Patient/family training, Bed mobility, Gait training, Spoke to MD, Spoke to nursing, Spoke to case management, Spoke to advanced practitioner  Equipment Recommended: Other (Comment) (To be determined)       See flowsheet documentation for full assessment, interventions and recommendations  Prognosis: Fair  Problem List: Decreased strength, Decreased endurance, Impaired balance, Decreased mobility, Decreased safety awareness, Impaired vision, Impaired hearing  Assessment: In summary, accompanying factors including patient history, examination of body system(s), clinical presentation and clinical decision making were considered  Patient presents with comorbid conditions that include transient cerebral ischemia, hypertensive urgency, GERD and hyperglycemia  Upon entry, patient was lying in bed  Patient stated that she did not know PT was ordered for her and was upset because she was caught off guard  Therapist explained to the patient why PT was there to evaluate her and the patient acknowledged that she understood  The patient's systolic blood pressure was high during transfers and ambulation, however the RN stated her baseline blood pressure has been running high during this hospitalization  Patient reported that after the recent death of her , she has been more "stressed and anxious"  During ambulation the patient's RLE remained flexed and without proper heel strike  Patient also presented with a pelvic drop on the right side  Clinical presentation is currently unstable/unpredictable  The assigned level of complexity is: high   Patient would benefit from continued physical therapy treatment to address the deficits as stated above and restore or maximize level of functional mobility with consistency  From a physical therapy/mobility viewpoint, initial discharge recommendation would be: short term skilled PT in order to facilitate return to independent living at her home  Barriers to Discharge: Inaccessible home environment, Decreased caregiver support     Recommendation: Short-term skilled PT     PT - OK to Discharge:  (When medically cleared)    See flowsheet documentation for full assessment

## 2018-12-12 NOTE — PROGRESS NOTES
Progress Note - Neysa Najjar 8/7/1929, 80 y o  female MRN: [id number]    Unit/Bed#: [facility] 509-01 Encounter: [id number]    Primary Care Provider: Paola Morrissey MD   Date and time admitted to hospital: 12/10/2018  2:35 PM        * Transient cerebral ischemia   Assessment & Plan    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     No acute ischemic disease     Age-appropriate cerebral atrophy     No pathologic enhancement    · An MRA of the head was completed on 12/11/2018 with the following results/impression:  IMPRESSION:     No evidence of significant stenosis, dissection or occlusion involving cerebral arteries    · An MRA of the neck/carotid arteries was completed on 12/11/2018 with the following results/impression:  IMPRESSION:  No evidence of significant stenosis, dissection or occlusion involving cervical carotid or vertebral segments     Small but patent right vertebral artery terminates in PICA    · An echocardiogram was completed today, 12/12/2018, with the following results/impression:  IMPRESSIONS:  1  Mildly increased left ventricular wall thickness, normal left ventricular systolic function, early grade 1 diastolic dysfunction  EF approximately 60%  2  Borderline left atrial cavity enlargement  No evidence of interatrial shunts by color-flow Doppler as well as by bubble study with agitated saline  3  Moderate aortic valve sclerosis, no stenosis  Trace aortic valve regurgitation  4  Moderate to severe mitral annular calcification, trace mitral valve regurgitation    5  Mild tricuspid valve regurgitation  6  No obvious pulmonary hypertension  7  No pericardial effusion  · The patient requires acute rehabilitation placement versus short-term rehabilitation upon discharge     Hypertensive urgency   Assessment & Plan    · Improved  · Increase amlodipine to 5 mg PO BID  · Continue losartan 100 mg PO Qdaily  · PRN IV labetalol  · Follow the blood pressure trend closely     Hyperglycemia   Assessment & Plan    · A hemoglobin A1c level was checked with the following results:  Results for Lucina Killian (MRN [id number]) as of 2018 16:13   Ref  Range 2018 04:55   Hemoglobin A1C Latest Ref Range: 4 2 - 6 3 % 5 8   EAG Latest Units: mg/dl 120     · Outpatient follow-up with her PCP in regards to this matter     Gastroesophageal reflux   Assessment & Plan    · Continue PPI treatment         VTE Pharmacologic Prophylaxis:   Pharmacologic: Enoxaparin (Lovenox)  Mechanical VTE Prophylaxis in Place: Yes    Time Spent for Care: 20 minutes  More than 50% of total time spent on counseling and coordination of care as described above  Current Length of Stay: 1 day(s)    Current Patient Status: Inpatient   Certification Statement: The patient will continue to require additional inpatient hospital stay due to the patient requiring acute rehabilitation placement versus short-term rehabilitation placement upon discharge  Code Status: Level 3 - DNAR and DNI      Subjective: The patient was seen and examined  The patient is doing better  No chest pain  No shortness of breath  No speech difficulties  Objective:     Vitals:   Temp (24hrs), Av 6 °F (36 4 °C), Min:97 2 °F (36 2 °C), Max:98 °F (36 7 °C)    Temp:  [97 2 °F (36 2 °C)-98 °F (36 7 °C)] 98 °F (36 7 °C)  HR:  [51-78] 62  Resp:  [16-18] 16  BP: (135-184)/(60-76) 168/70  SpO2:  [96 %-98 %] 98 %  Body mass index is 20 28 kg/m²  Input and Output Summary (last 24 hours):        Intake/Output Summary (Last 24 hours) at 18 Dipika Elam 23 filed at 12/12/18 1300   Gross per 24 hour   Intake             1940 ml   Output             1401 ml   Net              539 ml       Physical Exam:     Physical Exam  General:  NAD, awake, alert, follows commands  HEENT:  NC/AT, mucous membranes moist  Neck:  Supple, No JVP elevation  CV:  + S1, + S2, RRR  Pulm:  Lung fields are CTA bilaterally  Abd:  Soft, Non-tender, Non-distended  Ext:  No clubbing/cyanosis/edema  Skin:  No rashes      Additional Data:     Labs:      Results from last 7 days  Lab Units 12/12/18  0523   WBC Thousand/uL 5 80   HEMOGLOBIN g/dL 13 0   HEMATOCRIT % 38 5   PLATELETS Thousands/uL 238   NEUTROS PCT % 61   LYMPHS PCT % 28   MONOS PCT % 9   EOS PCT % 1       Results from last 7 days  Lab Units 12/12/18  0522   SODIUM mmol/L 138   POTASSIUM mmol/L 4 2   CHLORIDE mmol/L 108   CO2 mmol/L 23   BUN mg/dL 16   CREATININE mg/dL 0 58*   ANION GAP mmol/L 7   CALCIUM mg/dL 9 1   ALBUMIN g/dL 3 5   TOTAL BILIRUBIN mg/dL 0 80   ALK PHOS U/L 54   ALT U/L 20   AST U/L 21   GLUCOSE RANDOM mg/dL 115*       Results from last 7 days  Lab Units 12/10/18  1520   INR  1 04           Results from last 7 days  Lab Units 12/11/18  0455   HEMOGLOBIN A1C % 5 8       Results from last 7 days  Lab Units 12/12/18  0558 12/12/18  0523 12/10/18  1520   LACTIC ACID mmol/L  --  1 2 2 0   PROCALCITONIN ng/ml <0 05  --   --            * I Have Reviewed All Lab Data Listed Above  * Additional Pertinent Lab Tests Reviewed:  Leann 66 Admission Reviewed        Recent Cultures (last 7 days):           Last 24 Hours Medication List:     Current Facility-Administered Medications:  acetaminophen 650 mg Oral Q6H PRN Maddy Dawson,     amLODIPine 5 mg Oral BID Maddy Dawson DO    aspirin 81 mg Oral Daily Claudene Bradford, MD    atorvastatin 40 mg Oral QPM Claudene Bradford, MD    clopidogrel 75 mg Oral Daily Nataliya Lovelace MD    enoxaparin 40 mg Subcutaneous Daily Mariza Daigle DO Ryan    labetalol 10 mg Intravenous Q4H PRN Greyson Aponte DO    losartan 100 mg Oral Daily Bell Daniel MD    pantoprazole 40 mg Oral Early Morning Bell Daniel MD    sodium chloride 50 mL/hr Intravenous Continuous Bell Daniel MD Last Rate: 50 mL/hr (12/11/18 1550)        Today, Patient Was Seen By: Greyson Aponte DO    ** Please Note: Dictation voice to text software may have been used in the creation of this document   **

## 2018-12-12 NOTE — NURSING NOTE
Patient assisted to bed side commode for BM  BP assessed as patient is assisted back to bed  /76  Patient asymptomatic  Will reassess BP as indicated  Remaining VS WDL, NSR on monitor  Patient denies pain at this time  Patient encouraged to utilize call bell

## 2018-12-12 NOTE — ASSESSMENT & PLAN NOTE
· A hemoglobin A1c level was checked with the following results:  Results for Miroslava Suggs (MRN [id number]) as of 12/12/2018 16:13   Ref   Range 12/11/2018 04:55   Hemoglobin A1C Latest Ref Range: 4 2 - 6 3 % 5 8   EAG Latest Units: mg/dl 120     · Outpatient follow-up with her PCP in regards to this matter

## 2018-12-12 NOTE — PLAN OF CARE
DISCHARGE PLANNING     Discharge to home or other facility with appropriate resources Progressing        Knowledge Deficit     Patient/family/caregiver demonstrates understanding of disease process, treatment plan, medications, and discharge instructions Progressing        Nutrition/Hydration-ADULT     Nutrient/Hydration intake appropriate for improving, restoring or maintaining nutritional needs Progressing        Potential for Falls     Patient will remain free of falls Progressing        SAFETY ADULT     Maintain or return to baseline ADL function Progressing     Maintain or return mobility status to optimal level Progressing

## 2018-12-12 NOTE — PROGRESS NOTES
Progress Note - Neurology   [facility] Room 80 y o  female MRN: [id number]  Unit/Bed#: 5T -01 Encounter: [id number]    Assessment:  1  Transient ischemic attack, characterized by transient aphasia  No evidence of acute stroke event nor any significant stenosis/occlusion by head and neck MRA  Possible secondary to small a athero embolism  Could also been on a hypertensive basis  2  Gait disturbance  Plan:  1  The patient was naive to platelet antagonists therapy at the onset of her admitting event  Given the fact that her symptoms were transient would suggest coverage with dual antiplatelets (aspirin 81 mg and clopidogrel 75 mg) for three weeks and if at that point in time stable, then continue on aspirin only  2   Statin  3  Appears to be an excellent candidate for extended inpatient rehab  4  Appropriate blood pressure control measures as per primary service  5  Await results of 2D echocardiogram       Discussed with Dr Davis  Discussed in detail at bedside with patient's extended family  Neurology will remain available to advise and also follow on an outpatient basis  Subjective: Has remained asymptomatic since admission  Did review her study base thus far  MR brain demonstrated no findings to suggest an acute ischemic stroke  Head neck MRA with no significant stenotic/occlusive disease  EEG was a normal study  Lipid profile with LDL at 92  Review of Systems as above  Vitals: Blood pressure 168/70, pulse 62, temperature 98 °F (36 7 °C), temperature source Temporal, resp  rate 16, height 5' 2" (1 575 m), weight 50 3 kg (110 lb 14 3 oz), SpO2 98 %  Physical Exam:   Lungs clear to auscultation  Rhythm regular  Again some communication difficulties in countered as result of patient's hearing impairment  However, appeared to have no ongoing symbolic language difficulties  Soft facial asymmetry again noted, unchanged    No lateralized extremity weakness but again with weakness at the right shoulder which is old  Lab, Imaging and other studies:   CBC:   Results from last 7 days  Lab Units 12/12/18  0523 12/11/18  0455 12/10/18  1520   WBC Thousand/uL 5 80 6 00 7 90   RBC Million/uL 3 98* 3 97* 4 71   HEMOGLOBIN g/dL 13 0 13 1 15 4   HEMATOCRIT % 38 5 38 5 46 2*   MCV fL 97 97 98   PLATELETS Thousands/uL 238 248 276   , BMP/CMP:   Results from last 7 days  Lab Units 12/12/18  0522 12/11/18  0455 12/10/18  1520   SODIUM mmol/L 138 138 140   POTASSIUM mmol/L 4 2 4 4 3 5*   CHLORIDE mmol/L 108 108 103   CO2 mmol/L 23 24 26   BUN mg/dL 16 12 12   CREATININE mg/dL 0 58* 0 75 0 66   CALCIUM mg/dL 9 1 9 2 9 8   AST U/L 21  --  18   ALT U/L 20  --  19   ALK PHOS U/L 54  --  74   EGFR ml/min/1 73sq m 82 71 79   , TSH:   Results from last 7 days  Lab Units 12/12/18  0522   TSH 3RD GENERATON uIU/mL 1 980   , Lipid Profile:   Results from last 7 days  Lab Units 12/11/18  0455   HDL mg/dL 40   LDL CALC mg/dL 92   TRIGLYCERIDES mg/dL 115    I have personally reviewed pertinent reports  and I have personally reviewed pertinent films in PACS    Counseling / Coordination of Care  I spent a total of 25 min with the patient with greater than 50% of that time spent counseling and coordinating her care, specifically discussing her diagnosis, additional tests, and discussing with team, as detailed above

## 2018-12-12 NOTE — CONSULTS
Consultation - Kirby Garg [id number] 80 y o  female MRN: [id number]  Unit/Bed#: 5T -01 Encounter: [id number]    Assessment/Plan     Assessment:  Cerebral vascular disease with TIA and aphasia  Abnormal gait  Hypertension  Plan:  The patient is an appropriate candidate for rehabilitation therapies to improve function, particularly in light of the fact that she was living alone at home prior to this admission  Therapy should work on functional mobility including bed mobility and standing and balance and transfers and walking  She did use a walker in the past   In addition, therapies for daily activities such as bathing and dressing and toileting will be appropriate  This can be most appropriately accomplished at the acute inpatient rehabilitation level where she can receive 3 hours per day of therapy with close medical supervision for the blood pressure and cerebral vascular disease issues  She can tolerate this level of therapy  Medication interventions to address the cerebral vascular disease including anti-platelet agents and statin medications are in progress  Regarding her gait, she did require assistance for walking  She normally uses a walker  She will need therapies to improve lower limb strength and motor control as well as balance and safety awareness for independent mobility  Regarding blood pressure, the internal medicine team is monitoring and managing the medication, establishing appropriate blood pressure control without allowing hypoperfusion  History of Present Illness   HPI:  Lena Aguilar is a 80 y o  female who presents with an episode of aphasia and mental status change  She indicates that she was sitting at home prepping the Rosary when she felt that she was not herself and was not sure what she was doing  She spoke with her daughter on the phone shortly thereafter and was noted to have speech involvement  She was admitted to [address] [place] [address] on December 10   She was evaluated and diagnosed with TIA as well as hypertension urgency  MRI did not reveal new stroke but did reveal changes of chronic micro vessel ischemia  She did participate in therapy and required assistance for mobility  On today's visit, she has the abnormal mobility and generalized weakness  These problems are of 2 days duration and moderate in severity and involve the entire body, particularly the lower limbs, and walking function  She also admits some funny memory but indicates that this is improving     She reports adequate bowel and bladder function except for occasional dribbling"  She reports adequate sleep and appetite, though she admits that her appetite is inconsistent  She denies chest pain or shortness of breath or lightheadedness or dizziness or seizures or loss of consciousness       Inpatient consult to Physical Medicine Rehab  Consult performed by: Lawrence Pace  Consult ordered by: Gila Briceño          Review of Systems   Constitutional: Positive for activity change  Negative for appetite change and fever  HENT: Negative for drooling and trouble swallowing  Eyes: Negative for photophobia and visual disturbance  Respiratory: Negative for shortness of breath and wheezing  Cardiovascular: Negative for chest pain and leg swelling  Gastrointestinal: Negative for abdominal pain and blood in stool  Endocrine: Negative for polyphagia and polyuria  Genitourinary: Negative for difficulty urinating and dysuria  Musculoskeletal: Positive for arthralgias and gait problem  Skin: Negative for color change  Neurological: Negative for weakness and numbness  Hematological: Negative for adenopathy  Psychiatric/Behavioral: Negative for confusion         Historical Information   Past Medical History:   Diagnosis Date    GERD (gastroesophageal reflux disease)     H  pylori infection     Hypertension      Past Surgical History:   Procedure Laterality Date    CHOLECYSTECTOMY       Social History   History   Alcohol Use No     History   Drug Use No     History   Smoking Status    Never Smoker   Smokeless Tobacco    Never Used       Home Setup:  She lives alone and is ambulatory with a walker  She notes that her family assists with heavy homemaking and shopping  Functional Status Prior to Admission:  She was independent with simple self-care activities and walking with a walker  Family performs heavier homemaking tasks  Functional Status on Admission:  Requiring assistance for activities    History reviewed  No pertinent family history  Meds/Allergies   all current active meds have been reviewed  Allergies   Allergen Reactions    Azithromycin Diarrhea       Objective   Vitals: Blood pressure 168/70, pulse 62, temperature 98 °F (36 7 °C), temperature source Temporal, resp  rate 16, height 5' 2" (1 575 m), weight 50 3 kg (110 lb 14 3 oz), SpO2 98 %  Invasive Devices     Peripheral Intravenous Line            Peripheral IV 12/11/18 Right Forearm less than 1 day                Physical Exam   Constitutional:   She is well-developed but thin with BMI of 20   HENT:   Head: Normocephalic and atraumatic  Eyes: Conjunctivae and EOM are normal    Neck: Normal range of motion  Neck supple  No thyromegaly present  Cardiovascular: Normal rate and regular rhythm  Distal pulses are diminished   Pulmonary/Chest: Effort normal and breath sounds normal  No respiratory distress  She has no wheezes  Abdominal: Soft  Bowel sounds are normal  There is no tenderness  Musculoskeletal:   Active range of motion is functional except for the right shoulder where there is minimal active range but full passive range, consistent with rotator cuff arthropathy  Both calves are nontender  There is no significant lower limb edema  Neurological:   Cognitive function is grossly intact    She is oriented and hold appropriate conversation except for some limitation because of diminished hearing  There is some diminished insight/denial of her medical difficulties  Cranial nerve examination is unremarkable except for some mild facial asymmetry  Extraocular movements are intact  There is no visual field cut or neglect  Motor strength is generally preserved except for the right shoulder where there is minimal active range of motion suggestive of rotator cuff pathology  Distal right upper limb strength is functional   Sensation is present for light touch and joint position sense including the 1st toe on each foot  Muscle stretch reflexes are difficult to assess because the patient does not adequately relax  Coordination is good for finger to chin testing with the left upper limb and right upper limb within the limitations of the shoulder  On heel-to-shin testing, the right lower limb performs appropriately, there is diminished accuracy with the left lower limb  Skin: Skin is warm and dry  Psychiatric: Her behavior is normal    Nursing note and vitals reviewed  Lab Results: I have personally reviewed pertinent lab results  Imaging: I have personally reviewed pertinent films in PACS  EKG, Pathology, and Other Studies: I have personally reviewed pertinent reports  Code Status: Level 3 - DNAR and DNI  Advance Directive and Living Will:      Power of :    POLST:      Counseling / Coordination of Care  Total floor / unit time spent today 50 minutes  Greater than 50% of total time was spent with the patient and / or family counseling and / or coordination of care  A description of the counseling / coordination of care:  I explained that rehabilitation therapies would likely be appropriate and important in improving her function to a level of independence to return home  Karrie Nissen

## 2018-12-12 NOTE — PHYSICAL THERAPY NOTE
Physical Therapy Evaluation     Time In: 1005  Time Out : 1030  Total Time: 25 minutes    MRN: [id number]    Patient is a 80 y o  female admitted to [address] [place] [address] on 12/10/2018 with diagnos(es) Aphasia [R47 01]  Confusion [R41 0]  Altered mental status [R41 82]  Transient ischemic attack (TIA) [G45 9] and principal problem(s) of Transient cerebral ischemia    Past Medical History:   Diagnosis Date    GERD (gastroesophageal reflux disease)     H  pylori infection     Hypertension         Past Surgical History:   Procedure Laterality Date    CHOLECYSTECTOMY         Please refer to H&P for further details       PT consulted to assess patient's functional mobility and discharge needs  Ordered are PT evaluation and treatment  Chart reviewed  RN cleared patient for PT         12/12/18 1005   Note Type   Note type Eval only   Pain Assessment   Pain Assessment No/denies pain   Pain Score No Pain   Home Living   Type of [address] [place] [address] Two level;Stairs to enter with rails; Able to live on main level with bedroom/bathroom; Other (Comment); Bed/bath upstairs;1/2 bath on main level  (5 ALBER w R Handrail; 16 steps b/t levels w/ R handrail )   Bathroom Shower/Tub Tub/shower unit   Bathroom Toilet Standard   Bathroom Equipment Grab bars in shower;Grab bars around toilet;Commode; Other (Comment)  (2 Commodes, one on each level of the home)   [address] Other (Comment); Cane  (RW)   Additional Comments Patient states she sponge bathes on the main level of her home  Patient also states she has a RW on each level of her home   Prior Function   Level of [place] Independent with ADLs and functional mobility; Needs assistance with IADLs   Lives With Alone   Receives Help From Other (Comment)  (Daughter and 3 sons)   ADL Assistance Independent   IADLs Needs assistance   Falls in the last 6 months 0  (per patient)   Comments Patient stated she used a RW for ambulation prior to this hospitalization  Pt reports her daughter visits 1-2x a week to complete small household chores and that her 3 sons visit for an hour intermittently throughout the week  Restrictions/Precautions   Weight Bearing Precautions Per Order No   Other Precautions Fall Risk;Visual impairment;Hard of hearing   General   Additional Pertinent History Vitals: (pre activity) supine HOB elevated: BP - 200/83 on LUE  SpO2 97-98% on RA; Heart rate 58 bpm  (pre activity) seated EOB: BP - 195/80 on LUE  (post activity) seated EOB: BP - 180/91 on LUE; SpO2 98% on RA; Heart rate 62 bpm   Family/Caregiver Present No   Cognition   Overall Cognitive Status WFL   Arousal/Participation Alert   Orientation Level Oriented X4   Following Commands Follows one step commands with increased time or repetition   RLE Assessment   RLE Assessment WFL  (Hip flexion 4/5; Knee ext 4/5; Ankle DF 5/5)   LLE Assessment   LLE Assessment WFL  (Hip flexion 4/5; Knee ext 4/5; Ankle DF 5/5)   Coordination   Movements are Fluid and Coordinated 1   Bed Mobility   Rolling L 5  Supervision   Additional items Comment; Bedrails;HOB elevated  (Close supervision for safety)   Supine to Sit 5  Supervision   Additional items [facility] elevated;Comment; Bedrails  (Close supervision for safety)   Transfers   Sit to Stand 5  Supervision   Additional items Bedrails;Verbal cues; Other  (VCs for hand placement; Close supervision for safety)   Stand to Sit 5  Supervision   Additional items Armrests; Verbal cues; Other  (VCs for hand placement; Close supervision for safety)   Toilet transfer 4  Minimal assistance   Additional items Assist x 2;Bedrails;Commode  (Min A for support)   Ambulation/Elevation   Gait pattern Decreased foot clearance;Narrow JANE  (No heel strike on the RLE; Pelvic drop on the R)   Gait Assistance 4  Minimal assist   Additional items Assist x 1;Other (Comment)  (Min A for support;  Patient assisted with IV pole management)   Assistive Device Rolling walker   Distance 30 ft; 10 ft   Stair Management Assistance Not tested   Balance   Static Sitting Fair   Dynamic Sitting Fair -   Static Standing Fair -   Dynamic Standing Fair -   Ambulatory Poor +   Endurance Deficit   Endurance Deficit Yes   Endurance Deficit Description (Limited endurance for activity)   Activity Tolerance   Activity Tolerance Patient limited by fatigue   Nurse Made Aware (RN Gretta Castaneda cleared patient for treatment and made aware of VS)   Assessment   Prognosis Fair   Problem List Decreased strength;Decreased endurance; Impaired balance;Decreased mobility; Decreased safety awareness; Impaired vision; Impaired hearing   Assessment In summary, accompanying factors including patient history, examination of body system(s), clinical presentation and clinical decision making were considered  Patient presents with comorbid conditions that include transient cerebral ischemia, hypertensive urgency, GERD and hyperglycemia  Upon entry, patient was lying in bed  Patient stated that she did not know PT was ordered for her and was upset because she was caught off guard  Therapist explained to the patient why PT was there to evaluate her and the patient acknowledged that she understood  The patient's systolic blood pressure was high during transfers and ambulation, however the RN stated her baseline blood pressure has been running high during this hospitalization  Patient reported that after the recent death of her , she has been more "stressed and anxious"  During ambulation the patient's RLE remained flexed and without proper heel strike  Patient also presented with a pelvic drop on the right side  Clinical presentation is currently unstable/unpredictable  The assigned level of complexity is: high  Patient would benefit from continued physical therapy treatment to address the deficits as stated above and restore or maximize level of functional mobility with consistency   From a physical therapy/mobility viewpoint, initial discharge recommendation would be: short term skilled PT in order to facilitate return to independent living at her home  Barriers to Discharge Inaccessible home environment;Decreased caregiver support   Goals   Patient Goals "I want to be able to do for myself"   LTG Expiration Date 12/22/18   Long Term Goal #1 1  Patient to complete all functional bed mobility with independence in order to safely get into/out of bed  2  Patient to complete sit<>stand transfers with modified independence in order to safely and functionally mobilize from varying surfaces within their home  3 Patient to ambulate 100 ft with use of RW with modified independence in order to safely and functionally mobilize within her home  4  Patient to ascend/descend 5 steps with use of R handrail with supervision in order to functionally and safely enter/exit her home  Treatment Day 0   Plan   Treatment/Interventions ADL retraining;Functional transfer training;LE strengthening/ROM; Elevations; Therapeutic exercise; Endurance training;Equipment eval/education;Patient/family training;Bed mobility;Gait training;Spoke to MD;Spoke to nursing;Spoke to case management;Spoke to advanced practitioner   PT Frequency Other (Comment)  (3x/wk in 10 PT treatment sessions )   Recommendation   Recommendation Short-term skilled PT   Equipment Recommended Other (Comment)  (To be determined)   PT - OK to Discharge (When medically cleared)   Barthel Index   Feeding 10   Bathing 5   Grooming Score 5   Dressing Score 5   Bladder Score 10   Bowels Score 10   Toilet Use Score 5   Transfers (Bed/Chair) Score 10   Mobility (Level Surface) Score 0   Stairs Score 0   Barthel Index Score 60       Thompson Arndt, RAUL

## 2018-12-12 NOTE — ASSESSMENT & PLAN NOTE
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     No acute ischemic disease     Age-appropriate cerebral atrophy     No pathologic enhancement    · An MRA of the head was completed on 12/11/2018 with the following results/impression:  IMPRESSION:     No evidence of significant stenosis, dissection or occlusion involving cerebral arteries    · An MRA of the neck/carotid arteries was completed on 12/11/2018 with the following results/impression:  IMPRESSION:  No evidence of significant stenosis, dissection or occlusion involving cervical carotid or vertebral segments     Small but patent right vertebral artery terminates in PICA    · An echocardiogram was completed today, 12/12/2018, with the following results/impression:  IMPRESSIONS:  1  Mildly increased left ventricular wall thickness, normal left ventricular systolic function, early grade 1 diastolic dysfunction  EF approximately 60%  2  Borderline left atrial cavity enlargement  No evidence of interatrial shunts by color-flow Doppler as well as by bubble study with agitated saline  3  Moderate aortic valve sclerosis, no stenosis  Trace aortic valve regurgitation  4  Moderate to severe mitral annular calcification, trace mitral valve regurgitation  5  Mild tricuspid valve regurgitation  6  No obvious pulmonary hypertension  7  No pericardial effusion      · The patient requires acute rehabilitation placement versus short-term rehabilitation upon discharge

## 2018-12-12 NOTE — ASSESSMENT & PLAN NOTE
· Improved  · Increase amlodipine to 5 mg PO BID  · Continue losartan 100 mg PO Qdaily  · PRN IV labetalol  · Follow the blood pressure trend closely

## 2018-12-12 NOTE — NURSING NOTE
Patient resting in bed, HOB elevated  Patient denies pain at this time and is absent from signs of distress  Previous assessment remains unchanged  Patient encouraged to utilize call bell  Will continue to monitor

## 2018-12-12 NOTE — NURSING NOTE
Patient resting in bed, denies pain at this time  IV infiltrated, fluids held and IV removed  New IV placed as documented  Patient tolerated placement of new IV and removal of old well  Previous assessment remains unchanged  Will continue to monitor

## 2018-12-13 ENCOUNTER — HOSPITAL ENCOUNTER (OUTPATIENT)
Facility: HOSPITAL | Age: 83
Discharge: HOME | End: 2018-12-21
Attending: PHYSICAL MEDICINE & REHABILITATION | Admitting: PHYSICAL MEDICINE & REHABILITATION
Payer: MEDICARE

## 2018-12-13 VITALS
HEIGHT: 62 IN | WEIGHT: 110.89 LBS | RESPIRATION RATE: 16 BRPM | OXYGEN SATURATION: 96 % | HEART RATE: 78 BPM | DIASTOLIC BLOOD PRESSURE: 70 MMHG | SYSTOLIC BLOOD PRESSURE: 150 MMHG | BODY MASS INDEX: 20.41 KG/M2 | TEMPERATURE: 97.6 F

## 2018-12-13 DIAGNOSIS — G45.8 OTHER SPECIFIED TRANSIENT CEREBRAL ISCHEMIAS: ICD-10-CM

## 2018-12-13 DIAGNOSIS — I16.0 HYPERTENSIVE URGENCY: Primary | ICD-10-CM

## 2018-12-13 PROBLEM — R26.9 ABNORMALITY OF GAIT: Status: ACTIVE | Noted: 2018-12-13

## 2018-12-13 PROBLEM — R53.81 PHYSICAL DECONDITIONING: Status: ACTIVE | Noted: 2018-12-13

## 2018-12-13 LAB
ANION GAP SERPL CALCULATED.3IONS-SCNC: 8 MMOL/L (ref 5–14)
BUN SERPL-MCNC: 16 MG/DL (ref 5–25)
CALCIUM SERPL-MCNC: 9.3 MG/DL (ref 8.4–10.2)
CHLORIDE SERPL-SCNC: 106 MMOL/L (ref 97–108)
CO2 SERPL-SCNC: 24 MMOL/L (ref 22–30)
CREAT SERPL-MCNC: 0.64 MG/DL (ref 0.6–1.2)
ERYTHROCYTE [DISTWIDTH] IN BLOOD BY AUTOMATED COUNT: 13.3 %
GFR SERPL CREATININE-BSD FRML MDRD: 79 ML/MIN/1.73SQ M
GLUCOSE SERPL-MCNC: 113 MG/DL (ref 70–99)
HCT VFR BLD AUTO: 39.7 % (ref 36–46)
HGB BLD-MCNC: 13.4 G/DL (ref 12–16)
MAGNESIUM SERPL-MCNC: 2.1 MG/DL (ref 1.6–2.3)
MCH RBC QN AUTO: 32.8 PG (ref 26.8–34.3)
MCHC RBC AUTO-ENTMCNC: 33.7 G/DL (ref 31.4–37.4)
MCV RBC AUTO: 97 FL (ref 82–98)
PLATELET # BLD AUTO: 246 THOUSANDS/UL (ref 150–450)
PMV BLD AUTO: 7.3 FL (ref 8.9–12.7)
POTASSIUM SERPL-SCNC: 3.9 MMOL/L (ref 3.6–5)
RBC # BLD AUTO: 4.08 MILLION/UL (ref 4–5.2)
SODIUM SERPL-SCNC: 138 MMOL/L (ref 137–147)
WBC # BLD AUTO: 6.5 THOUSAND/UL (ref 4.31–10.16)

## 2018-12-13 PROCEDURE — 99239 HOSP IP/OBS DSCHRG MGMT >30: CPT | Performed by: INTERNAL MEDICINE

## 2018-12-13 PROCEDURE — 83735 ASSAY OF MAGNESIUM: CPT | Performed by: INTERNAL MEDICINE

## 2018-12-13 PROCEDURE — 85027 COMPLETE CBC AUTOMATED: CPT | Performed by: INTERNAL MEDICINE

## 2018-12-13 PROCEDURE — 80048 BASIC METABOLIC PNL TOTAL CA: CPT | Performed by: INTERNAL MEDICINE

## 2018-12-13 RX ORDER — AMLODIPINE BESYLATE 2.5 MG/1
2.5 TABLET ORAL 2 TIMES DAILY
Status: DISCONTINUED | OUTPATIENT
Start: 2018-12-13 | End: 2018-12-14

## 2018-12-13 RX ORDER — LOSARTAN POTASSIUM 50 MG/1
100 TABLET ORAL DAILY
Status: CANCELLED | OUTPATIENT
Start: 2018-12-14

## 2018-12-13 RX ORDER — ATORVASTATIN CALCIUM 40 MG/1
40 TABLET, FILM COATED ORAL EVERY EVENING
Refills: 0
Start: 2018-12-13 | End: 2019-01-23 | Stop reason: SDUPTHER

## 2018-12-13 RX ORDER — LABETALOL HYDROCHLORIDE 5 MG/ML
10 INJECTION, SOLUTION INTRAVENOUS EVERY 4 HOURS PRN
Refills: 0
Start: 2018-12-13 | End: 2018-12-13 | Stop reason: HOSPADM

## 2018-12-13 RX ORDER — MELATONIN
1000 DAILY
Status: DISCONTINUED | OUTPATIENT
Start: 2018-12-14 | End: 2018-12-21 | Stop reason: HOSPADM

## 2018-12-13 RX ORDER — LABETALOL HYDROCHLORIDE 5 MG/ML
10 INJECTION, SOLUTION INTRAVENOUS EVERY 4 HOURS PRN
Status: CANCELLED | OUTPATIENT
Start: 2018-12-13

## 2018-12-13 RX ORDER — AMLODIPINE BESYLATE 5 MG/1
5 TABLET ORAL 2 TIMES DAILY
Refills: 0 | Status: ON HOLD
Start: 2018-12-13 | End: 2018-12-20

## 2018-12-13 RX ORDER — CLOPIDOGREL BISULFATE 75 MG/1
75 TABLET ORAL DAILY
Refills: 0
Start: 2018-12-14 | End: 2019-01-23 | Stop reason: SDUPTHER

## 2018-12-13 RX ORDER — ACETAMINOPHEN 325 MG/1
325 TABLET ORAL EVERY 6 HOURS PRN
Status: DISCONTINUED | OUTPATIENT
Start: 2018-12-13 | End: 2018-12-21 | Stop reason: HOSPADM

## 2018-12-13 RX ORDER — ACETAMINOPHEN 325 MG/1
650 TABLET ORAL EVERY 6 HOURS PRN
Status: CANCELLED | OUTPATIENT
Start: 2018-12-13

## 2018-12-13 RX ORDER — LANOLIN ALCOHOL/MO/W.PET/CERES
CREAM (GRAM) TOPICAL
Status: DISCONTINUED | OUTPATIENT
Start: 2018-12-13 | End: 2018-12-20

## 2018-12-13 RX ORDER — LOSARTAN POTASSIUM 50 MG/1
100 TABLET ORAL DAILY
Status: DISCONTINUED | OUTPATIENT
Start: 2018-12-14 | End: 2018-12-21 | Stop reason: HOSPADM

## 2018-12-13 RX ORDER — ACETAMINOPHEN 325 MG/1
650 TABLET ORAL EVERY 6 HOURS PRN
Refills: 0
Start: 2018-12-13 | End: 2019-01-03

## 2018-12-13 RX ORDER — ASPIRIN 81 MG/1
81 TABLET, CHEWABLE ORAL DAILY
Status: DISCONTINUED | OUTPATIENT
Start: 2018-12-14 | End: 2018-12-21 | Stop reason: HOSPADM

## 2018-12-13 RX ORDER — LOSARTAN POTASSIUM 50 MG/1
100 TABLET ORAL DAILY
Status: DISCONTINUED | OUTPATIENT
Start: 2018-12-14 | End: 2018-12-13

## 2018-12-13 RX ORDER — LOSARTAN POTASSIUM 100 MG/1
100 TABLET ORAL DAILY
Refills: 0
Start: 2018-12-14 | End: 2019-01-23 | Stop reason: SDUPTHER

## 2018-12-13 RX ORDER — AMLODIPINE BESYLATE 5 MG/1
5 TABLET ORAL 2 TIMES DAILY
Status: CANCELLED | OUTPATIENT
Start: 2018-12-13

## 2018-12-13 RX ORDER — ACETAMINOPHEN 325 MG/1
650 TABLET ORAL EVERY 6 HOURS PRN
Status: DISCONTINUED | OUTPATIENT
Start: 2018-12-13 | End: 2018-12-13

## 2018-12-13 RX ORDER — ASPIRIN 81 MG/1
81 TABLET, CHEWABLE ORAL DAILY
Refills: 0
Start: 2018-12-14

## 2018-12-13 RX ORDER — CLOPIDOGREL BISULFATE 75 MG/1
75 TABLET ORAL DAILY
Status: CANCELLED | OUTPATIENT
Start: 2018-12-14

## 2018-12-13 RX ORDER — ASPIRIN 81 MG/1
81 TABLET, CHEWABLE ORAL DAILY
Status: CANCELLED | OUTPATIENT
Start: 2018-12-14

## 2018-12-13 RX ORDER — PANTOPRAZOLE SODIUM 40 MG/1
40 TABLET, DELAYED RELEASE ORAL
Status: CANCELLED | OUTPATIENT
Start: 2018-12-14

## 2018-12-13 RX ORDER — PANTOPRAZOLE SODIUM 20 MG/1
20 TABLET, DELAYED RELEASE ORAL
Start: 2018-12-14 | End: 2019-01-23 | Stop reason: SDUPTHER

## 2018-12-13 RX ORDER — CLOPIDOGREL BISULFATE 75 MG/1
75 TABLET ORAL DAILY
Status: DISCONTINUED | OUTPATIENT
Start: 2018-12-14 | End: 2018-12-21 | Stop reason: HOSPADM

## 2018-12-13 RX ORDER — ATORVASTATIN CALCIUM 40 MG/1
40 TABLET, FILM COATED ORAL EVERY EVENING
Status: DISCONTINUED | OUTPATIENT
Start: 2018-12-13 | End: 2018-12-21 | Stop reason: HOSPADM

## 2018-12-13 RX ORDER — PANTOPRAZOLE SODIUM 20 MG/1
20 TABLET, DELAYED RELEASE ORAL
Status: DISCONTINUED | OUTPATIENT
Start: 2018-12-14 | End: 2018-12-21 | Stop reason: HOSPADM

## 2018-12-13 RX ORDER — ATORVASTATIN CALCIUM 40 MG/1
40 TABLET, FILM COATED ORAL EVERY EVENING
Status: CANCELLED | OUTPATIENT
Start: 2018-12-13

## 2018-12-13 RX ADMIN — ENOXAPARIN SODIUM 40 MG: 40 INJECTION SUBCUTANEOUS at 08:25

## 2018-12-13 RX ADMIN — ASPIRIN 81 MG 81 MG: 81 TABLET ORAL at 08:23

## 2018-12-13 RX ADMIN — AMLODIPINE BESYLATE 5 MG: 5 TABLET ORAL at 08:23

## 2018-12-13 RX ADMIN — CLOPIDOGREL BISULFATE 75 MG: 75 TABLET ORAL at 08:23

## 2018-12-13 RX ADMIN — PANTOPRAZOLE SODIUM 40 MG: 40 TABLET, DELAYED RELEASE ORAL at 05:34

## 2018-12-13 RX ADMIN — LOSARTAN POTASSIUM 100 MG: 50 TABLET, FILM COATED ORAL at 08:24

## 2018-12-13 RX ADMIN — ATORVASTATIN CALCIUM 40 MG: 40 TABLET, FILM COATED ORAL at 19:09

## 2018-12-13 NOTE — ASSESSMENT & PLAN NOTE
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     No acute ischemic disease     Age-appropriate cerebral atrophy     No pathologic enhancement    · An MRA of the head was completed on 12/11/2018 with the following results/impression:  IMPRESSION:     No evidence of significant stenosis, dissection or occlusion involving cerebral arteries    · An MRA of the neck/carotid arteries was completed on 12/11/2018 with the following results/impression:  IMPRESSION:  No evidence of significant stenosis, dissection or occlusion involving cervical carotid or vertebral segments     Small but patent right vertebral artery terminates in PICA    · An echocardiogram was completed today, 12/12/2018, with the following results/impression:  IMPRESSIONS:  1  Mildly increased left ventricular wall thickness, normal left ventricular systolic function, early grade 1 diastolic dysfunction  EF approximately 60%  2  Borderline left atrial cavity enlargement  No evidence of interatrial shunts by color-flow Doppler as well as by bubble study with agitated saline  3  Moderate aortic valve sclerosis, no stenosis  Trace aortic valve regurgitation  4  Moderate to severe mitral annular calcification, trace mitral valve regurgitation  5  Mild tricuspid valve regurgitation  6  No obvious pulmonary hypertension  7  No pericardial effusion      · The patient will be discharged to [facility]ARD[facility] ([address] [place] [address])

## 2018-12-13 NOTE — ASSESSMENT & PLAN NOTE
· A hemoglobin A1c level was checked with the following results:  Results for Andrew Woo (MRN [id number]) as of 12/12/2018 16:13   Ref   Range 12/11/2018 04:55   Hemoglobin A1C Latest Ref Range: 4 2 - 6 3 % 5 8   EAG Latest Units: mg/dl 120     · Outpatient follow-up with her PCP in regards to this matter

## 2018-12-13 NOTE — NURSING NOTE
VSS  Awake, alert and oriented  Moving all extremities  Speech clear  Lungs clear  Assist of 1 OOB to commode chair  Slow unsteady gait  Call bell at pt's reach

## 2018-12-13 NOTE — ASSESSMENT & PLAN NOTE
· Improved  · Amlodipine was increased to 5 mg PO BID on 12/12/2018  · Continue losartan 100 mg PO Qdaily  · PRN IV labetalol  · Follow the blood pressure trend closely at [facility]ARDIOV[facility] (Acute rehabilitation center)

## 2018-12-13 NOTE — DISCHARGE SUMMARY
Discharge- Carleton Bamberger 8/7/1929, 80 y o  female MRN: [id number]    Unit/Bed#:  -01 Encounter: [id number]    Primary Care Provider: Jorge L Vazquez MD   Date and time admitted to hospital: 12/10/2018  2:35 PM        * Transient cerebral ischemia   Assessment & Plan    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     No acute ischemic disease     Age-appropriate cerebral atrophy     No pathologic enhancement    · An MRA of the head was completed on 12/11/2018 with the following results/impression:  IMPRESSION:     No evidence of significant stenosis, dissection or occlusion involving cerebral arteries    · An MRA of the neck/carotid arteries was completed on 12/11/2018 with the following results/impression:  IMPRESSION:  No evidence of significant stenosis, dissection or occlusion involving cervical carotid or vertebral segments     Small but patent right vertebral artery terminates in PICA    · An echocardiogram was completed today, 12/12/2018, with the following results/impression:  IMPRESSIONS:  1  Mildly increased left ventricular wall thickness, normal left ventricular systolic function, early grade 1 diastolic dysfunction  EF approximately 60%  2  Borderline left atrial cavity enlargement  No evidence of interatrial shunts by color-flow Doppler as well as by bubble study with agitated saline  3  Moderate aortic valve sclerosis, no stenosis  Trace aortic valve regurgitation  4   Moderate to severe mitral annular calcification, trace mitral valve regurgitation  5  Mild tricuspid valve regurgitation  6  No obvious pulmonary hypertension  7  No pericardial effusion  · The patient will be discharged to [facility] ([address] [place] [address])     Hypertensive urgency   Assessment & Plan    · Improved  · Amlodipine was increased to 5 mg PO BID on 12/12/2018  · Continue losartan 100 mg PO Qdaily  · PRN IV labetalol  · Follow the blood pressure trend closely at [facility] (Acute rehabilitation center)     Hyperglycemia   Assessment & Plan    · A hemoglobin A1c level was checked with the following results:  Results for Kee Fontana (MRN [id number]) as of 12/12/2018 16:13   Ref  Range 12/11/2018 04:55   Hemoglobin A1C Latest Ref Range: 4 2 - 6 3 % 5 8   EAG Latest Units: mg/dl 120     · Outpatient follow-up with her PCP in regards to this matter     Gastroesophageal reflux   Assessment & Plan    · Continue PPI treatment           Discharging Physician / Practitioner: Francesca Borja DO  PCP: Rylan Nazario MD  Admission Date: 12/10/18  Discharge Date: 12/13/18      Consultations During Hospital Stay:  · Neurology  · Physical [facility] ([facility])    Procedures Performed:   · None    Significant Findings / Test Results:   Ct Head Without Contrast    Result Date: 12/10/2018  Impression: No acute intracranial abnormality  Microangiopathic changes   Workstation performed: [id number]     Mra And Or Mrv Head Wo Contrast    Result Date: 12/11/2018  Impression: No evidence of significant stenosis, dissection or occlusion involving cerebral arteries Workstation performed: [id number]     Mra Carotids Wo And W Contrast    Result Date: 12/11/2018  Impression: No evidence of significant stenosis, dissection or occlusion involving cervical carotid or vertebral segments Small but patent right vertebral artery terminates in PICA Workstation performed: [id number]     Mri Brain W Wo Contrast    Result Date: 12/11/2018  Impression: Mild-to-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 No acute ischemic disease Age-appropriate cerebral atrophy No pathologic enhancement Workstation performed: [id number]     ECG 12 lead   Order: [id number]   Status:  Final result   Visible to patient:  No (Inaccessible in MyChart)   Next appt:  01/07/2019 at 11:00 AM in Neurology Jose Francisco Rust MD)    Ref Range & Units 12/10/18 1450   Ventricular Rate BPM 76    Atrial Rate BPM 76    MO Interval ms 154    QRSD Interval ms 84    QT Interval ms 380    QTC Interval ms 427    P [place] degrees 77    QRS Axis degrees 8    T Wave Axis degrees 58    Narrative     Sinus rhythm with Premature atrial complexes  Otherwise normal ECG  No previous ECGs available  Confirmed by Tony Handley ([id number]) on 12/10/2018 3:54:45 PM      Specimen Collected: 12/10/18 14:50   Last Resulted: 12/10/18 15:54                Incidental Findings:   · None     Test Results Pending at Discharge (will require follow up): · None     Outpatient Tests Requested:  · CBC with diff , CMP, Mag, Phos in 2 days and in 1 week    Complications:  None    Reason for Admission:  Transient cerebral ischemia (TIA)    Hospital Course:     Costa Dunne is a 80 y o  female patient who originally presented to the hospital on 12/10/2018 due to transient aphasia  Please see above list of diagnoses and related plan for additional information  Condition at Discharge: good     Discharge Day Visit / Exam:     Subjective: The patient was seen and examined  The patient is doing well  No speech difficulties  No chest pain  No shortness of breath    Vitals: Blood Pressure: (!) 188/68 (12/13/18 0823)  Pulse: (!) 54 (12/13/18 0805)  Temperature: 97 6 °F (36 4 °C) (12/13/18 0805)  Temp Source: Temporal (12/13/18 0805)  Respirations: 16 (12/13/18 0805)  Height: 5' 2" (157 5 cm) (12/11/18 6157)  Weight - Scale: 50 3 kg (110 lb 14 3 oz) (12/10/18 2009)  SpO2: 96 % (12/13/18 0805)  Exam: Physical Exam  General:  NAD, awake, alert, follows commands  HEENT:  NC/AT, mucous membranes moist  Neck:  Supple, No JVP elevation  CV:  + S1, + S2, Bradycardia, Regular rhythm  Pulm:  Lung fields are CTA bilaterally  Abd:  Soft, Non-tender, Non-distended  Ext:  No clubbing/cyanosis/edema  Skin:  No rashes  Neuro:  No focal deficits    Discharge instructions/Information to patient and family:   See after visit summary for information provided to patient and family  Provisions for Follow-Up Care:  See after visit summary for information related to follow-up care and any pertinent home health orders  Disposition:     Acute Rehab at [facility]       Discharge Statement:  I spent 40 minutes discharging the patient  This time was spent on the day of discharge  I had direct contact with the patient on the day of discharge  Greater than 50% of the total time was spent examining patient, answering all patient questions, arranging and discussing plan of care with patient as well as directly providing post-discharge instructions  Additional time then spent on discharge activities  Discharge Medications:  See after visit summary for reconciled discharge medications provided to patient and family        ** Please Note: This note has been constructed using a voice recognition system **

## 2018-12-13 NOTE — SOCIAL WORK
Pt evaluated by physiatrist Dr Lisa Choe who has recommended acute level of rehab  Since pt was in [facility] previously, pt and family are preferring her return  SW spoke with [facility] who confirmed pt is approved for admission today  Attending also confirmed pt is medically cleared  IMM#2 completed with pt  Adriel Gutierrez to notify [facility] when nursing can call for report

## 2018-12-13 NOTE — PHYSICIAN ADVISOR
Current patient class: Inpatient  The patient is currently on Hospital Day: 4 at [address]      The patient was admitted to the hospital at [phone] on 12/11/18 for the following diagnosis:  Aphasia [R47 01]  Confusion [R41 0]  Altered mental status [R41 82]  Transient ischemic attack (TIA) [G45 9]       There is documentation in the medical record of an expected length of stay of at least 2 midnights  The patient is therefore expected to satisfy the 2 midnight benchmark and given the 2 midnight presumption is appropriate for INPATIENT ADMISSION  Given this expectation of a satisfying stay, CMS instructs us that the patient is most often appropriate for inpatient admission under part A provided medical necessity is documented in the chart  After review of the relevant documentation, labs, vital signs and test results, the patient is appropriate for INPATIENT ADMISSION  Admission to the hospital as an inpatient is a complex decision making process which requires the practitioner to consider the patients presenting complaint, history and physical examination and all relevant testing  With this in mind, in this case, the patient was deemed appropriate for INPATIENT ADMISSION  After review of the documentation and testing available at the time of the admission I concur with this clinical determination of medical necessity  Rationale is as follows: The patient is a 80 yrs old Female who presented to the ED at 12/10/2018  2:35 PM with a chief complaint of Altered Mental Status (Daughter reports pt had period of confusion at approx 1215 (via telephone)  Pt AAO x 3 at time of triage  Pt reports urinary urgency for a few days  )     Given the need for further hospitalization, and along with the documentation of medical necessity present in the chart, the patient is appropriate for inpatient admission    The patient is expected to satisfy the 2 midnight benchmark, and will require further acute medical care  The patient does have comorbid conditions which increases the risk for significant adverse outcome  Given this the patient is appropriate for inpatient admission        The patients vitals on arrival were ED Triage Vitals   Temperature Pulse Respirations Blood Pressure SpO2   12/10/18 1441 12/10/18 1441 12/10/18 1441 12/10/18 1441 12/10/18 1441   (!) 96 3 °F (35 7 °C) 97 16 (!) 220/114 97 %      Temp Source Heart Rate Source Patient Position - Orthostatic VS BP Location FiO2 (%)   12/10/18 1441 12/10/18 1441 12/10/18 1441 12/10/18 1441 --   Tympanic Monitor Sitting Right arm       Pain Score       12/10/18 1541       No Pain           Past Medical History:   Diagnosis Date    GERD (gastroesophageal reflux disease)     H  pylori infection     Hypertension      Past Surgical History:   Procedure Laterality Date    CHOLECYSTECTOMY             Consults have been placed to:   IP CONSULT TO NEUROLOGY  IP CONSULT TO CASE MANAGEMENT  IP CONSULT TO NUTRITION SERVICES  IP CONSULT TO PHYSICAL MEDICINE REHAB    Vitals:    12/12/18 1044 12/12/18 1500 12/12/18 1950 12/12/18 2345   BP: 168/70 (!) 171/73 141/68 148/72   BP Location: Left arm Left arm Left arm Left arm   Pulse:  60 (!) 54 56   Resp:  18 18 16   Temp:  98 2 °F (36 8 °C) (!) 97 4 °F (36 3 °C) (!) 97 3 °F (36 3 °C)   TempSrc:  Temporal Temporal Temporal   SpO2:  99% 96% 97%   Weight:       Height:           Most recent labs:    Recent Labs      12/10/18   1520   12/12/18   0522  12/12/18   0523  12/12/18   0558   WBC  7 90   < >   --   5 80   --    HGB  15 4   < >   --   13 0   --    HCT  46 2*   < >   --   38 5   --    PLT  276   < >   --   238   --    K  3 5*   < >  4 2   --    --    CALCIUM  9 8   < >  9 1   --    --    BUN  12   < >  16   --    --    CREATININE  0 66   < >  0 58*   --    --    INR  1 04   --    --    --    --    TROPONINI  <0 01   --    --    --   <0 01   CKTOTAL   --    --   39   --    --    AST  18   --   21   -- --    ALT  19   --   20   --    --    ALKPHOS  74   --   54   --    --     < > = values in this interval not displayed         Scheduled Meds:  Current Facility-Administered Medications:  acetaminophen 650 mg Oral Q6H PRN Isis Bustos, DO    amLODIPine 5 mg Oral BID Isis Bustos, DO    aspirin 81 mg Oral Daily Juan Carlos Whitney MD    atorvastatin 40 mg Oral QPM Juan Carlos Whitney MD    clopidogrel 75 mg Oral Daily Devan Brandon MD    enoxaparin 40 mg Subcutaneous Daily Isis Bustos,     labetalol 10 mg Intravenous Q4H PRN Isis Bustos, DO    losartan 100 mg Oral Daily Juan Carlos Whitney MD    pantoprazole 40 mg Oral Early Morning Juan Carlos Whitney MD    sodium chloride 50 mL/hr Intravenous Continuous Juan Carlos Whitney MD Last Rate: 50 mL/hr (12/12/18 1727)     Continuous Infusions:  sodium chloride 50 mL/hr Last Rate: 50 mL/hr (12/12/18 1727)     PRN Meds:   acetaminophen    labetalol    Surgical procedures (if appropriate):

## 2018-12-14 PROCEDURE — 99304 1ST NF CARE SF/LOW MDM 25: CPT | Performed by: FAMILY MEDICINE

## 2018-12-14 RX ORDER — AMLODIPINE BESYLATE 5 MG/1
5 TABLET ORAL 2 TIMES DAILY
Status: DISCONTINUED | OUTPATIENT
Start: 2018-12-14 | End: 2018-12-19

## 2018-12-14 RX ADMIN — ATORVASTATIN CALCIUM 40 MG: 40 TABLET, FILM COATED ORAL at 17:36

## 2018-12-14 RX ADMIN — ASPIRIN 81 MG 81 MG: 81 TABLET ORAL at 08:08

## 2018-12-14 RX ADMIN — Medication 1 APPLICATION: at 22:43

## 2018-12-14 RX ADMIN — AMLODIPINE BESYLATE 2.5 MG: 2.5 TABLET ORAL at 08:09

## 2018-12-14 RX ADMIN — LOSARTAN POTASSIUM 100 MG: 50 TABLET, FILM COATED ORAL at 08:09

## 2018-12-14 RX ADMIN — VITAMIN D, TAB 1000IU (100/BT) 1000 UNITS: 25 TAB at 08:08

## 2018-12-14 RX ADMIN — PANTOPRAZOLE SODIUM 20 MG: 20 TABLET, DELAYED RELEASE ORAL at 06:07

## 2018-12-14 RX ADMIN — ENOXAPARIN SODIUM 40 MG: 40 INJECTION SUBCUTANEOUS at 08:09

## 2018-12-14 RX ADMIN — CLOPIDOGREL BISULFATE 75 MG: 75 TABLET ORAL at 08:09

## 2018-12-14 NOTE — CONSULTS
Consult- Cosmo Downs Juan Ugarteas 8/7/1929, 80 y o  female MRN: [id number]    Unit/Bed#: [facility] 269-02 Encounter: [id number]    Primary Care Provider: Blake Infante MD   Date and time admitted to hospital: 12/13/2018  4:25 PM      Inpatient consult to Internal Medicine  Consult performed by: Belinda Dhaliwal ordered by: GERALD RUIZ          Abnormality of gait   Assessment & Plan    Continue physical therapy     Gastroesophageal reflux   Assessment & Plan    Stable on Protonix     Hypertensive urgency   Assessment & Plan    Stable blood pressures on current antihypertensive regimen     Transient cerebral ischemia   Assessment & Plan    Cont antihypertensives and statins  Continues PT OT     * Physical deconditioning   Assessment & Plan    Continue physical therapy         VTE Prophylaxis: Enoxaparin (Lovenox)  / reason for no mechanical VTE prophylaxis on lovenox     Recommendations for Discharge:  · none    Counseling / Coordination of Care Time: 45 minutes  Greater than 50% of total time spent on patient counseling and coordination of care  Collaboration of Care: Were Recommendations Directly Discussed with Primary Treatment Team? - Yes     History of Present Illness:    Andre Tsai is a 80 y o  female who is originally admitted to the physiatry service due to transient cerebral ischemia  We are consulted for medical management  Patient denies any chest pain or shortness of breath  She is doing well while in rehab    Review of Systems:    Review of Systems   Constitutional: Negative for appetite change, chills, fatigue and fever  HENT: Negative for hearing loss, sore throat and trouble swallowing  Eyes: Negative for photophobia, discharge and visual disturbance  Respiratory: Negative for chest tightness and shortness of breath  Cardiovascular: Negative for chest pain and palpitations  Gastrointestinal: Negative for abdominal pain, blood in stool and vomiting     Endocrine: Negative for polydipsia and polyuria  Genitourinary: Negative for difficulty urinating, dysuria, flank pain and hematuria  Musculoskeletal: Negative for back pain and gait problem  Skin: Negative for rash  Allergic/Immunologic: Negative for environmental allergies and food allergies  Neurological: Negative for dizziness, seizures, syncope and headaches  Hematological: Does not bruise/bleed easily  Psychiatric/Behavioral: Negative for behavioral problems  All other systems reviewed and are negative  Past Medical and Surgical History:     Past Medical History:   Diagnosis Date    GERD (gastroesophageal reflux disease)     H  pylori infection     Hypertension     TIA (transient ischemic attack)        Past Surgical History:   Procedure Laterality Date    CHOLECYSTECTOMY         Meds/Allergies:    PTA meds:   Prior to Admission Medications   Prescriptions Last Dose Informant Patient Reported? Taking? [facility] D3 1000 units capsule   No No   Sig: TAKE ONE CAPSULE BY MOUTH EVERY DAY   acetaminophen (TYLENOL) 325 mg tablet   No No   Sig: Take 2 tablets (650 mg total) by mouth every 6 (six) hours as needed for mild pain, headaches or fever   amLODIPine (NORVASC) 5 mg tablet   No No   Sig: Take 1 tablet (5 mg total) by mouth 2 (two) times a day   aspirin 81 mg chewable tablet   No No   Sig: Chew 1 tablet (81 mg total) daily   atorvastatin (LIPITOR) 40 mg tablet   No No   Sig: Take 1 tablet (40 mg total) by mouth every evening   clopidogrel (PLAVIX) 75 mg tablet   No No   Sig: Take 1 tablet (75 mg total) by mouth daily   enoxaparin (LOVENOX) 40 mg/0 4 mL   No No   Sig: Inject 0 4 mL (40 mg total) under the skin daily   losartan (COZAAR) 100 MG tablet   No No   Sig: Take 1 tablet (100 mg total) by mouth daily   pantoprazole (PROTONIX) 20 mg tablet   No No   Sig: Take 1 tablet (20 mg total) by mouth daily in the early morning      Facility-Administered Medications: None       Allergies:    Allergies   Allergen Reactions    Azithromycin Diarrhea       Social History:     Marital Status:     Substance Use History:   History   Alcohol Use No     History   Smoking Status    Never Smoker   Smokeless Tobacco    Never Used     History   Drug Use No       Family History:    Family History   Problem Relation Age of Onset    Hypertension Father        Physical Exam:     Vitals:   Blood Pressure: 132/62 (12/14/18 1738)  Pulse: 56 (12/14/18 1542)  Temperature: 98 1 °F (36 7 °C) (12/14/18 1542)  Temp Source: Temporal (12/14/18 1542)  Respirations: 18 (12/14/18 1542)  Height: 5' 2" (157 5 cm) (12/13/18 1929)  Weight - Scale: 51 kg (112 lb 8 oz) (12/13/18 1929)  SpO2: 98 % (12/14/18 1542)    Physical Exam   Constitutional: She is oriented to person, place, and time  She appears well-developed and well-nourished  HENT:   Head: Normocephalic and atraumatic  Right Ear: External ear normal    Left Ear: External ear normal    Mouth/Throat: Oropharynx is clear and moist    Eyes: Pupils are equal, round, and reactive to light  Conjunctivae and EOM are normal    Neck: Normal range of motion  Neck supple  Cardiovascular: Normal rate, regular rhythm, normal heart sounds and intact distal pulses  Pulmonary/Chest: Effort normal and breath sounds normal    Abdominal: Soft  Bowel sounds are normal  She exhibits no mass  There is no tenderness  There is no rebound and no guarding  Genitourinary:   Genitourinary Comments: deferred   Musculoskeletal: Normal range of motion  Neurological: She is alert and oriented to person, place, and time  Skin: Skin is warm and dry  No rash noted  Psychiatric: She has a normal mood and affect  Nursing note and vitals reviewed  Additional Data:     Lab Results: I have personally reviewed pertinent reports          Results from last 7 days  Lab Units 12/13/18  0433 12/12/18  0523   WBC Thousand/uL 6 50 5 80   HEMOGLOBIN g/dL 13 4 13 0   HEMATOCRIT % 39 7 38 5   PLATELETS Thousands/uL 246 238   NEUTROS PCT %  --  61   LYMPHS PCT %  --  28   MONOS PCT %  --  9   EOS PCT %  --  1       Results from last 7 days  Lab Units 12/13/18  0433 12/12/18  0522   SODIUM mmol/L 138 138   POTASSIUM mmol/L 3 9 4 2   CHLORIDE mmol/L 106 108   CO2 mmol/L 24 23   BUN mg/dL 16 16   CREATININE mg/dL 0 64 0 58*   ANION GAP mmol/L 8 7   CALCIUM mg/dL 9 3 9 1   ALBUMIN g/dL  --  3 5   TOTAL BILIRUBIN mg/dL  --  0 80   ALK PHOS U/L  --  54   ALT U/L  --  20   AST U/L  --  21   GLUCOSE RANDOM mg/dL 113* 115*       Results from last 7 days  Lab Units 12/10/18  1520   INR  1 04       Results from last 7 days  Lab Units 12/12/18  0558 12/10/18  1520   TROPONIN I ng/mL <0 01 <0 01     Lab Results   Component Value Date/Time    HGBA1C 5 8 12/11/2018 04:55 AM           Results from last 7 days  Lab Units 12/12/18  0558 12/12/18  0523 12/10/18  1520   LACTIC ACID mmol/L  --  1 2 2 0   PROCALCITONIN ng/ml <0 05  --   --        Imaging: I have personally reviewed pertinent reports  No orders to display       EKG, Pathology, and Other Studies Reviewed on Admission:   · EKG:  Reviewed    ** Please Note: This note has been constructed using a voice recognition system   **

## 2018-12-14 NOTE — ASSESSMENT & PLAN NOTE
Patient was noted to have impaired mobility and balance and self-care during the acute hospitalization  She has bid to [facility] to address these issues  At home, she needs to be independent walking  She generally uses a rolling walker  She currently needs supervision and occasional minimal assistance for safety    We will proceed with a comprehensive program

## 2018-12-14 NOTE — ASSESSMENT & PLAN NOTE
Her blood pressure was markedly elevated on admission, it remains elevated and blood pressure medications are being adjusted  Blood pressure is elevated today  I will change the parameters and adjust the dose of amlodipine

## 2018-12-14 NOTE — PROGRESS NOTES
Progress Note - Dell Moran 1929, 80 y o  female MRN: [id number]    Unit/Bed#: [facility] 269-02 Encounter: [id number]    Primary Care Provider: Ken Bautista MD   Date and time admitted to hospital: 2018  4:25 PM        Abnormality of gait   Assessment & Plan    Her gait is abnormal with diminished ground clearance and occasional balance impairment  We will proceed with full program to optimize function  Gastroesophageal reflux   Assessment & Plan    She continues on the proton pump inhibitor for GERD symptoms  Hypertensive urgency   Assessment & Plan    Her blood pressure was markedly elevated on admission, it remains elevated and blood pressure medications are being adjusted  Blood pressure is elevated today  I will change the parameters and adjust the dose of amlodipine  Transient cerebral ischemia   Assessment & Plan    The patient had a TIA which led to the admission to the hospital   She is currently on aspirin and Plavix and statin for prevention  We will closely monitor for symptoms and blood pressure for cerebral perfusion  * Physical deconditioning   Assessment & Plan    Patient was noted to have impaired mobility and balance and self-care during the acute hospitalization  She has bid to [facility] to address these issues  At home, she needs to be independent walking  She generally uses a rolling walker  She currently needs supervision and occasional minimal assistance for safety  We will proceed with a comprehensive program              Subjective/Objective     Subjective:   She feels well this morning, she admits poor sleep because she worries a lot  Appetite is fair  She states that the appetite has not been good for months, since her    Bowel and bladder function are adequate  She denies any lightheadedness or dizziness or chest pain or shortness of breath    She denies other EENT, ophthalmological, cardiac, pulmonary, GI, , hematologic, dermatologic, psychiatric, neurologic, and musculoskeletal difficulties other than those consistent with the past medical history and history of present illness  Objective:  Vitals: Blood pressure (!) 178/80, pulse 60, temperature 97 9 °F (36 6 °C), temperature source Temporal, resp  rate 20, height 5' 2" (1 575 m), weight 51 kg (112 lb 8 oz), SpO2 97 %  ,Body mass index is 20 58 kg/m²  No intake or output data in the 24 hours ending 12/14/18 1041    Invasive Devices          No matching active lines, drains, or airways          Physical Exam: Vital signs are noted, including elevated blood pressure  She is a well-developed thin woman sitting in the wheelchair next to the bed eating breakfast this morning  She is in no apparent distress  Head is normocephalic and atraumatic  Neck has functional range of motion without bruits  Mucous membranes are moist   Cardiac rhythm is regular  Lungs are clear without wheeze or rhonchi  Abdomen is soft and nontender  Skin is intact without local pressure breakdown or other open areas  Musculoskeletal examination reveals severe right shoulder apparent consistent with rotator cuff dysfunction  Otherwise, range of motion is present at all joints  Neurologic examination is remarkable for mild dorsiflexion weakness bilaterally but otherwise nonfocal     Lab, Imaging and other studies: I have personally reviewed pertinent reports      VTE Pharmacologic Prophylaxis: Enoxaparin (Lovenox)  VTE Mechanical Prophylaxis: sequential compression device

## 2018-12-14 NOTE — ASSESSMENT & PLAN NOTE
Her gait is abnormal with diminished ground clearance and occasional balance impairment  We will proceed with full program to optimize function

## 2018-12-14 NOTE — ASSESSMENT & PLAN NOTE
The patient had a TIA which led to the admission to the hospital   She is currently on aspirin and Plavix and statin for prevention  We will closely monitor for symptoms and blood pressure for cerebral perfusion

## 2018-12-15 RX ADMIN — ASPIRIN 81 MG 81 MG: 81 TABLET ORAL at 08:27

## 2018-12-15 RX ADMIN — CLOPIDOGREL BISULFATE 75 MG: 75 TABLET ORAL at 08:27

## 2018-12-15 RX ADMIN — Medication: at 21:23

## 2018-12-15 RX ADMIN — PANTOPRAZOLE SODIUM 20 MG: 20 TABLET, DELAYED RELEASE ORAL at 06:39

## 2018-12-15 RX ADMIN — VITAMIN D, TAB 1000IU (100/BT) 1000 UNITS: 25 TAB at 08:27

## 2018-12-15 RX ADMIN — ATORVASTATIN CALCIUM 40 MG: 40 TABLET, FILM COATED ORAL at 17:29

## 2018-12-15 RX ADMIN — ENOXAPARIN SODIUM 40 MG: 40 INJECTION SUBCUTANEOUS at 08:27

## 2018-12-15 RX ADMIN — AMLODIPINE BESYLATE 5 MG: 5 TABLET ORAL at 08:27

## 2018-12-15 NOTE — PROGRESS NOTES
Progress Note - Juan Cormier 80 y o  female MRN: [id number]    Unit/Bed#: [facility] 269-02 Encounter: [id number]      Assessment:  TIA, HTN, deconditioning, gait dysfunction, balance impairment    Plan:  Continue comprehensive inpatient rehabilitation program   Continue Norvasc, ASA, plavix  Cozaar and monitor HR/BP with therapy program    On lovenox DVT prophylaxis    Subjective:   Feels well today - denies any pain, nor dizziness, lightheaded, no chest pain, no SOB  States moving bowels well  No abdominal pain or nausea  States tolerating the therapy well  Wants to go home soon  Objective:     Vitals: Blood pressure 138/70, pulse 56, temperature 97 9 °F (36 6 °C), temperature source Temporal, resp  rate 19, height 5' 2" (1 575 m), weight 51 kg (112 lb 8 oz), SpO2 96 %  ,Body mass index is 20 58 kg/m²  No intake or output data in the 24 hours ending 12/15/18 1256    Physical Exam: General appearance: alert and oriented, in no acute distress and Good spirits today, talkative  Lungs: clear to auscultation bilaterally  Heart: regular rate and rhythm, S1, S2 normal, no murmur, click, rub or gallop  Abdomen: soft, non-tender; bowel sounds normal; no masses,  no organomegaly  Extremities: extremities normal, warm and well-perfused; no cyanosis, clubbing, or edema and No calf tenderness or homans sign  4/5 bilateral DF, 5/5 knee and 4/5 hip flexion  Right shoulder with very limited AROM mobility, passive without pain, but only 1/5 strength, likely rotator cuff weakness  4+/5 right biceps, 5/5 on left  GOod  bilaterally  Pulses: 2+ and symmetric     Invasive Devices          No matching active lines, drains, or airways          Lab, Imaging and other studies: I have personally reviewed pertinent reports      VTE Pharmacologic Prophylaxis: Enoxaparin (Lovenox)  VTE Mechanical Prophylaxis: reason for no mechanical VTE prophylaxis Mobile

## 2018-12-15 NOTE — PLAN OF CARE
Problem: Neurological Deficit  Goal: Neurological status is stable or improving  Interventions:  - Monitor and assess patient's level of consciousness, motor function, sensory function, and level of assistance needed for ADLs  - Monitor and report changes from baseline  Collaborate with interdisciplinary team to initiate plan and implement interventions as ordered  - Provide and maintain a safe environment  - Utilize seizure precautions  - Utilize fall precautions  - Utilize aspiration precautions  - Utilize bleeding precautions  Outcome: Progressing      Problem: Activity Intolerance/Impaired Mobility  Goal: Mobility/activity is maintained at optimum level for patient  Interventions:  - Assess and monitor patient  barriers to mobility and need for assistive/adaptive devices  - Assess patient's emotional response to limitations  - Collaborate with interdisciplinary team and initiate plans and interventions as ordered  - Encourage independent activity per ability   - Maintain proper body alignment  - Perform active/passive rom as tolerated/ordered    - Plan activities to conserve energy   - Turn patient  Outcome: Progressing

## 2018-12-16 RX ADMIN — LOSARTAN POTASSIUM 100 MG: 50 TABLET, FILM COATED ORAL at 08:48

## 2018-12-16 RX ADMIN — VITAMIN D, TAB 1000IU (100/BT) 1000 UNITS: 25 TAB at 08:47

## 2018-12-16 RX ADMIN — ASPIRIN 81 MG 81 MG: 81 TABLET ORAL at 08:47

## 2018-12-16 RX ADMIN — PANTOPRAZOLE SODIUM 20 MG: 20 TABLET, DELAYED RELEASE ORAL at 06:29

## 2018-12-16 RX ADMIN — CLOPIDOGREL BISULFATE 75 MG: 75 TABLET ORAL at 08:47

## 2018-12-16 RX ADMIN — Medication: at 21:12

## 2018-12-16 RX ADMIN — AMLODIPINE BESYLATE 5 MG: 5 TABLET ORAL at 08:48

## 2018-12-16 RX ADMIN — ENOXAPARIN SODIUM 40 MG: 40 INJECTION SUBCUTANEOUS at 08:47

## 2018-12-16 RX ADMIN — ATORVASTATIN CALCIUM 40 MG: 40 TABLET, FILM COATED ORAL at 17:07

## 2018-12-16 RX ADMIN — AMLODIPINE BESYLATE 5 MG: 5 TABLET ORAL at 17:07

## 2018-12-17 RX ADMIN — ASPIRIN 81 MG 81 MG: 81 TABLET ORAL at 08:18

## 2018-12-17 RX ADMIN — ATORVASTATIN CALCIUM 40 MG: 40 TABLET, FILM COATED ORAL at 17:36

## 2018-12-17 RX ADMIN — PANTOPRAZOLE SODIUM 20 MG: 20 TABLET, DELAYED RELEASE ORAL at 05:23

## 2018-12-17 RX ADMIN — CLOPIDOGREL BISULFATE 75 MG: 75 TABLET ORAL at 08:19

## 2018-12-17 RX ADMIN — VITAMIN D, TAB 1000IU (100/BT) 1000 UNITS: 25 TAB at 08:18

## 2018-12-17 RX ADMIN — Medication: at 21:01

## 2018-12-17 RX ADMIN — AMLODIPINE BESYLATE 5 MG: 5 TABLET ORAL at 08:18

## 2018-12-17 RX ADMIN — LOSARTAN POTASSIUM 100 MG: 50 TABLET, FILM COATED ORAL at 08:18

## 2018-12-17 RX ADMIN — ENOXAPARIN SODIUM 40 MG: 40 INJECTION SUBCUTANEOUS at 08:19

## 2018-12-17 NOTE — PROGRESS NOTES
Progress Note - Sonia Carabalol 8/7/1929, 80 y o  female MRN: [id number]    Unit/Bed#: [facility] 269-02 Encounter: [id number]    Primary Care Provider: Grabiel Dunbar MD   Date and time admitted to hospital: 12/13/2018  4:25 PM        Abnormality of gait   Assessment & Plan    Her gait is abnormal with diminished ground clearance and occasional balance impairment  We will proceed with full program to her gait quality and reduce fall risk  Gastroesophageal reflux   Assessment & Plan    She continues on the proton pump inhibitor for GERD symptoms  Hypertensive urgency   Assessment & Plan    Her blood pressure was markedly elevated on admission  It is intermittently elevated  She continues on ARB and a calcium blocker  We will adjust medications and parameters as needed  Transient cerebral ischemia   Assessment & Plan    The patient had a TIA which led to the admission to the hospital   She is currently on aspirin and Plavix and statin for prevention  We will closely monitor for symptoms and blood pressure for cerebral perfusion  * Physical deconditioning   Assessment & Plan    Patient was noted to have impaired mobility and balance and self-care during the acute hospitalization  She was admitted to the acute inpatient rehabilitation to address these issues  She needs to be independent with light homemaking at home  She currently needs supervision and verbal cues for safety  We will continue with the rehabilitation program to achieve the above goal              Subjective/Objective     Subjective:   She reports feeling well after a decent night's sleep  Appetite is fair  Bowel and bladder function are satisfactory according to the patient    She denies other significant ENT, ophthalmological, cardiac, pulmonary, GI, , hematologic, dermatologic, psychiatric, neurologic, and musculoskeletal difficulties other than those consistent with the past medical history and history of present illness  Objective:  Vitals: Blood pressure 158/76, pulse 56, temperature 97 9 °F (36 6 °C), temperature source Temporal, resp  rate 16, height 5' 2" (1 575 m), weight 51 kg (112 lb 8 oz), SpO2 97 %  ,Body mass index is 20 58 kg/m²  Intake/Output Summary (Last 24 hours) at 12/17/18 1006  Last data filed at 12/16/18 1253   Gross per 24 hour   Intake              440 ml   Output                0 ml   Net              440 ml       Invasive Devices          No matching active lines, drains, or airways          Physical Exam: Vital signs are noted  There is intermittent hypertension  Labs from last week are satisfactory  In general, she is a well-developed well-nourished individual sitting in the wheelchair, in no apparent distress  Head is normocephalic and atraumatic  Neck has functional range of motion  Mucous membranes are moist   Cardiac rhythm is regular  Lungs are clear  Abdomen is soft and nontender  Bowel sounds are present  Limb examination reveals functional range of motion throughout except the right shoulder where there is signs of rotator cuff impairment  Both calves are nontender  There is no significant edema  Neurologic examination is nonfocal   Skin examination reveals no local breakdown or pressure ulceration  Lab, Imaging and other studies: I have personally reviewed pertinent reports      VTE Pharmacologic Prophylaxis: Enoxaparin (Lovenox)  VTE Mechanical Prophylaxis: sequential compression device

## 2018-12-17 NOTE — ASSESSMENT & PLAN NOTE
Her gait is abnormal with diminished ground clearance and occasional balance impairment  We will proceed with full program to her gait quality and reduce fall risk

## 2018-12-17 NOTE — PLAN OF CARE
Activity Intolerance/Impaired Mobility     Mobility/activity is maintained at optimum level for patient [address] Discharge to home or other facility with appropriate resources Progressing        MUSCULOSKELETAL - ADULT     Maintain or return mobility to safest level of function Progressing     Maintain proper alignment of affected body part Progressing        Neurological Deficit     Neurological status is stable or improving Progressing        PAIN - ADULT     Verbalizes/displays adequate comfort level or baseline comfort level Progressing        Potential for Falls     Patient will remain free of falls Progressing        SAFETY ADULT     Maintain or return to baseline ADL function Progressing     Maintain or return mobility status to optimal level Progressing        SKIN/TISSUE INTEGRITY - ADULT     Skin integrity remains intact Progressing     Oral mucous membranes remain intact Progressing

## 2018-12-17 NOTE — ASSESSMENT & PLAN NOTE
Her blood pressure was markedly elevated on admission  It is intermittently elevated  She continues on ARB and a calcium blocker  We will adjust medications and parameters as needed

## 2018-12-17 NOTE — ASSESSMENT & PLAN NOTE
Patient was noted to have impaired mobility and balance and self-care during the acute hospitalization  She was admitted to the acute inpatient rehabilitation to address these issues  She needs to be independent with light homemaking at home  She currently needs supervision and verbal cues for safety    We will continue with the rehabilitation program to achieve the above goal

## 2018-12-18 PROCEDURE — 99309 SBSQ NF CARE MODERATE MDM 30: CPT | Performed by: INTERNAL MEDICINE

## 2018-12-18 RX ADMIN — AMLODIPINE BESYLATE 5 MG: 5 TABLET ORAL at 08:43

## 2018-12-18 RX ADMIN — Medication: at 21:26

## 2018-12-18 RX ADMIN — LOSARTAN POTASSIUM 100 MG: 50 TABLET, FILM COATED ORAL at 08:43

## 2018-12-18 RX ADMIN — VITAMIN D, TAB 1000IU (100/BT) 1000 UNITS: 25 TAB at 08:44

## 2018-12-18 RX ADMIN — ATORVASTATIN CALCIUM 40 MG: 40 TABLET, FILM COATED ORAL at 18:27

## 2018-12-18 RX ADMIN — CLOPIDOGREL BISULFATE 75 MG: 75 TABLET ORAL at 08:44

## 2018-12-18 RX ADMIN — PANTOPRAZOLE SODIUM 20 MG: 20 TABLET, DELAYED RELEASE ORAL at 06:40

## 2018-12-18 RX ADMIN — ENOXAPARIN SODIUM 40 MG: 40 INJECTION SUBCUTANEOUS at 08:44

## 2018-12-18 RX ADMIN — ASPIRIN 81 MG 81 MG: 81 TABLET ORAL at 08:44

## 2018-12-18 NOTE — ASSESSMENT & PLAN NOTE
Cont antihypertensives and statins    Continues PT OT  Continue aspirin Plavix  Will discuss with neuro how long dual therapy

## 2018-12-18 NOTE — ASSESSMENT & PLAN NOTE
Her blood pressure was markedly elevated on admission  It is intermittently elevated  She continues on ARB and a calcium blocker  We will adjust medications and parameters as needed  It is important to avoid hypoperfusion

## 2018-12-18 NOTE — ASSESSMENT & PLAN NOTE
Continue physical therapy  Continue present region slowly patient is getting is stronger she does live home alone  Mentally clear able to do her routine work

## 2018-12-18 NOTE — ASSESSMENT & PLAN NOTE
Stable blood pressures on current antihypertensive regimen  Blood pressure is 150 sys    Continue amlodipine and losartan I will avoid hypotension

## 2018-12-18 NOTE — ASSESSMENT & PLAN NOTE
Her gait is abnormal with diminished ground clearance and occasional balance impairment  We will proceed with full program to her gait quality and reduce fall risk  Her gait is improving with verbal cues and practice

## 2018-12-18 NOTE — ASSESSMENT & PLAN NOTE
Patient was noted to have impaired mobility and balance and self-care during the acute hospitalization  She was admitted to the acute inpatient rehabilitation to address these issues  She needs to be independent with bathroom transfers at home  She currently needs supervision and cues as well as infrequent physical assistance for balance    We will continue with the rehabilitation program to achieve the above goal

## 2018-12-18 NOTE — PROGRESS NOTES
VTE Pharmacologic Prophylaxis:   Pharmacologic: Enoxaparin (Lovenox)  Mechanical VTE Prophylaxis in Place: Yes    Patient Centered Rounds: I have performed bedside rounds with nursing staff today  Discussions with Specialists or Other Care Team Provider:  Discussed with Dr Yuli Solis    Education and Discussions with Family / Patient:  Patient    Time Spent for Care: 30 minutes  More than 50% of total time spent on counseling and coordination of care as described above  Current Length of Stay: 0 day(s)    Current Patient Status: Outpatient   Certification Statement: The patient will continue to require additional inpatient hospital stay due to Rehab    Discharge Plan:  For DEON    Code Status: Level 3 - DNAR and DNI      Subjective:   Patient alert oriented denied any chest pain or shortness of breath no GI  symptoms very pleasant cooperative wants to live alone at home recently lost her  she does have good support she has 1 daughter and 4 there supportive but she wants to still live independent and her own house that with her concern because children are recommending to go to nursing home but she wants to stay till she is alert able to perform all her activities    Objective:     Vitals:   Temp (24hrs), Av 9 °F (36 6 °C), Min:97 5 °F (36 4 °C), Max:98 2 °F (36 8 °C)    Temp:  [97 5 °F (36 4 °C)-98 2 °F (36 8 °C)] 98 1 °F (36 7 °C)  HR:  [52-56] 52  Resp:  [18] 18  BP: (120-173)/(59-74) 173/69  SpO2:  [96 %-98 %] 98 %  Body mass index is 20 58 kg/m²  Input and Output Summary (last 24 hours):     No intake or output data in the 24 hours ending 18 1327    Physical Exam:     Physical Exam   Constitutional: She is oriented to person, place, and time  She appears well-nourished  Thin build   HENT:   Head: Normocephalic and atraumatic     Right Ear: External ear normal    Left Ear: External ear normal    Mouth/Throat: Oropharynx is clear and moist    Eyes: Pupils are equal, round, and reactive to light  Conjunctivae and EOM are normal    Neck: Normal range of motion  Neck supple  Cardiovascular: Normal rate, regular rhythm, normal heart sounds and intact distal pulses  Pulmonary/Chest: Effort normal and breath sounds normal    Abdominal: Soft  Bowel sounds are normal  She exhibits no mass  There is no tenderness  There is no rebound and no guarding  Genitourinary:   Genitourinary Comments: deferred   Musculoskeletal: Normal range of motion  Neurological: She is alert and oriented to person, place, and time  Skin: Skin is warm and dry  No rash noted  Psychiatric: She has a normal mood and affect  Nursing note and vitals reviewed  ne when you have entered your exam)  (    Additional Data:     Labs:      Results from last 7 days  Lab Units 12/13/18  0433 12/12/18  0523   WBC Thousand/uL 6 50 5 80   HEMOGLOBIN g/dL 13 4 13 0   HEMATOCRIT % 39 7 38 5   PLATELETS Thousands/uL 246 238   NEUTROS PCT %  --  61   LYMPHS PCT %  --  28   MONOS PCT %  --  9   EOS PCT %  --  1       Results from last 7 days  Lab Units 12/13/18  0433 12/12/18  0522   SODIUM mmol/L 138 138   POTASSIUM mmol/L 3 9 4 2   CHLORIDE mmol/L 106 108   CO2 mmol/L 24 23   BUN mg/dL 16 16   CREATININE mg/dL 0 64 0 58*   ANION GAP mmol/L 8 7   CALCIUM mg/dL 9 3 9 1   ALBUMIN g/dL  --  3 5   TOTAL BILIRUBIN mg/dL  --  0 80   ALK PHOS U/L  --  54   ALT U/L  --  20   AST U/L  --  21   GLUCOSE RANDOM mg/dL 113* 115*                   Results from last 7 days  Lab Units 12/12/18  0558 12/12/18  0523   LACTIC ACID mmol/L  --  1 2   PROCALCITONIN ng/ml <0 05  --            * I Have Reviewed All Lab Data Listed Above  * Additional Pertinent Lab Tests Reviewed: All Labs For Current Hospital Admission Reviewed    Imaging:    Imaging Reports Reviewed Today Include:  None today  Imaging Personally Reviewed by Myself Includes:   Old reviewed    Recent Cultures (last 7 days):           Last 24 Hours Medication List:     Current Facility-Administered Medications:  acetaminophen 325 mg Oral Q6H PRN Kei Fox MD   amLODIPine 5 mg Oral BID Kei Fox MD   aspirin 81 mg Oral Daily Kei Fox MD   atorvastatin 40 mg Oral QPM Kei Fox MD   cholecalciferol 1,000 Units Oral Daily Kei Fox MD   clopidogrel 75 mg Oral Daily Kei Fox MD   enoxaparin 40 mg Subcutaneous Daily Kei Fox MD   losartan 100 mg Oral Daily Kei Fox MD   pantoprazole 20 mg Oral Early Morning Kei Fox MD   white petrolatum-mineral oil  Topical HS Kei Fox MD        Today, Patient Was Seen By: Diana Rios MD    ** Please Note: Dictation voice to text software may have been used in the creation of this document  **          Progress Note - Radha Villa 8/7/1929, 80 y o  female MRN: [id number]    Unit/Bed#: [facility] 269-02 Encounter: [id number]    Primary Care Provider: Ilene Brennan MD   Date and time admitted to hospital: 12/13/2018  4:25 PM        Abnormality of gait   Assessment & Plan    Continue physical therapy  Clinically patient is improving using walker  Continue physiotherapy     Gastroesophageal reflux   Assessment & Plan    Stable on Protonix  No indigestion or heartburn  Continue present regimen     Hypertensive urgency   Assessment & Plan    Stable blood pressures on current antihypertensive regimen  Blood pressure is 150 sys  Continue amlodipine and losartan I will avoid hypotension     Transient cerebral ischemia   Assessment & Plan    Cont antihypertensives and statins    Continues PT OT  Continue aspirin Plavix  Will discuss with neuro how long dual therapy     * Physical deconditioning   Assessment & Plan    Continue physical therapy  Continue present region slowly patient is getting is stronger she does live home alone  Mentally clear able to do her routine work

## 2018-12-19 RX ORDER — AMLODIPINE BESYLATE 5 MG/1
5 TABLET ORAL
Status: DISCONTINUED | OUTPATIENT
Start: 2018-12-19 | End: 2018-12-21 | Stop reason: HOSPADM

## 2018-12-19 RX ADMIN — ENOXAPARIN SODIUM 40 MG: 40 INJECTION SUBCUTANEOUS at 08:26

## 2018-12-19 RX ADMIN — VITAMIN D, TAB 1000IU (100/BT) 1000 UNITS: 25 TAB at 08:25

## 2018-12-19 RX ADMIN — Medication 1 APPLICATION: at 21:41

## 2018-12-19 RX ADMIN — CLOPIDOGREL BISULFATE 75 MG: 75 TABLET ORAL at 08:25

## 2018-12-19 RX ADMIN — AMLODIPINE BESYLATE 5 MG: 5 TABLET ORAL at 08:25

## 2018-12-19 RX ADMIN — ASPIRIN 81 MG 81 MG: 81 TABLET ORAL at 08:25

## 2018-12-19 RX ADMIN — ATORVASTATIN CALCIUM 40 MG: 40 TABLET, FILM COATED ORAL at 18:01

## 2018-12-19 RX ADMIN — LOSARTAN POTASSIUM 100 MG: 50 TABLET, FILM COATED ORAL at 08:25

## 2018-12-19 RX ADMIN — PANTOPRAZOLE SODIUM 20 MG: 20 TABLET, DELAYED RELEASE ORAL at 05:55

## 2018-12-19 NOTE — ASSESSMENT & PLAN NOTE
Her blood pressure was markedly elevated on admission  It is intermittently elevated  She continues on ARB and amlodipine  I will adjust the medications so that she receives the losartan 100 mg each morning and amlodipine 5 mg each evening  This will likely be appropriate, given the blood pressures and the hold parameters

## 2018-12-19 NOTE — PROGRESS NOTES
Progress Note - Linh Barney 8/7/1929, 80 y o  female MRN: [id number]    Unit/Bed#: [facility] 269-02 Encounter: [id number]    Primary Care Provider: Saida Estrada MD   Date and time admitted to hospital: 12/13/2018  4:25 PM        Abnormality of gait   Assessment & Plan    Her gait is abnormal with diminished ground clearance and occasional balance impairment  We will proceed with full program to her gait quality and reduce fall risk  Her gait is improving with verbal cues and practice  Gastroesophageal reflux   Assessment & Plan    She continues on the proton pump inhibitor for GERD symptoms  Hypertensive urgency   Assessment & Plan    Her blood pressure was markedly elevated on admission  It is intermittently elevated  She continues on ARB and amlodipine  I will adjust the medications so that she receives the losartan 100 mg each morning and amlodipine 5 mg each evening  This will likely be appropriate, given the blood pressures and the hold parameters  Transient cerebral ischemia   Assessment & Plan    The patient had a TIA which led to the admission to the hospital   She is currently on aspirin and Plavix and statin for prevention  We will closely monitor for symptoms and blood pressure for cerebral perfusion  * Physical deconditioning   Assessment & Plan    Patient was noted to have impaired mobility and balance and self-care during the acute hospitalization  She was admitted to the acute inpatient rehabilitation to address these issues  At home, she is normally able to negotiate a full flight of stairs independently  She currently needs supervision and cues as well as infrequent physical assistance for balance  We will continue with the rehabilitation program to achieve the above goal              Subjective/Objective     Subjective:   She reports feeling well today    She denies any new chest pain or shortness of breath or other EENT, ophthalmological, cardiac, pulmonary, GI, , hematologic, dermatologic, psychiatric, neurologic, or musculoskeletal difficulties other than those consistent with the past medical history and history of present illness  She reports good sleep and appetite  She reports adequate bowel and bladder function  She does recognize that her walking is improving and her lower limb strength is improving as well  She does complain that her walking is not yet as good as it should be  Objective:  Vitals: Blood pressure (!) 173/69, pulse 57, temperature 98 1 °F (36 7 °C), temperature source Temporal, resp  rate 16, height 5' 2" (1 575 m), weight 51 kg (112 lb 8 oz), SpO2 98 %  ,Body mass index is 20 58 kg/m²  No intake or output data in the 24 hours ending 12/19/18 1132    Invasive Devices          No matching active lines, drains, or airways          Physical Exam: Vital signs are noted  General, she is a well-developed well-nourished individual sitting in the wheelchair in no apparent distress  The head is normocephalic and atraumatic  The neck has functional range of motion  Cardiac rhythm is regular  Lungs are clear  Abdomen is soft and nontender  Mucous membranes are moist   Limb examination reveals calves are nontender and there is no significant edema  Range of motion is preserved actively, but she requires passive assistance for the right shoulder where there is history of rotator cuff dysfunction  Skin is intact without local breakdown  Neurologic examination is without focal weakness or sensory disturbance  Lab, Imaging and other studies: I have personally reviewed pertinent reports  VTE Pharmacologic Prophylaxis: Enoxaparin (Lovenox)  VTE Mechanical Prophylaxis: reason for no mechanical VTE prophylaxis She is on Lovenox and is ambulatory with nursing and therapy

## 2018-12-19 NOTE — ASSESSMENT & PLAN NOTE
Patient was noted to have impaired mobility and balance and self-care during the acute hospitalization  She was admitted to the acute inpatient rehabilitation to address these issues  At home, she is normally able to negotiate a full flight of stairs independently  She currently needs supervision and cues as well as infrequent physical assistance for balance    We will continue with the rehabilitation program to achieve the above goal

## 2018-12-20 RX ORDER — AMLODIPINE BESYLATE 5 MG/1
5 TABLET ORAL
Qty: 30 TABLET | Refills: 0
Start: 2018-12-20 | End: 2019-01-23 | Stop reason: SDUPTHER

## 2018-12-20 RX ADMIN — LOSARTAN POTASSIUM 100 MG: 50 TABLET, FILM COATED ORAL at 08:15

## 2018-12-20 RX ADMIN — ATORVASTATIN CALCIUM 40 MG: 40 TABLET, FILM COATED ORAL at 18:27

## 2018-12-20 RX ADMIN — Medication 1 APPLICATION: at 21:29

## 2018-12-20 RX ADMIN — PANTOPRAZOLE SODIUM 20 MG: 20 TABLET, DELAYED RELEASE ORAL at 06:41

## 2018-12-20 RX ADMIN — ENOXAPARIN SODIUM 40 MG: 40 INJECTION SUBCUTANEOUS at 08:15

## 2018-12-20 RX ADMIN — VITAMIN D, TAB 1000IU (100/BT) 1000 UNITS: 25 TAB at 08:15

## 2018-12-20 RX ADMIN — CLOPIDOGREL BISULFATE 75 MG: 75 TABLET ORAL at 08:15

## 2018-12-20 RX ADMIN — ASPIRIN 81 MG 81 MG: 81 TABLET ORAL at 08:15

## 2018-12-20 NOTE — ASSESSMENT & PLAN NOTE
Her gait is abnormal with diminished ground clearance and occasional balance impairment  There is also abnormally narrow base of support  She is able to improve this with verbal cues  We will proceed with full program to her gait quality and reduce fall risk  Her gait is improving with verbal cues and practice

## 2018-12-20 NOTE — ASSESSMENT & PLAN NOTE
Her blood pressure was markedly elevated on admission  It is intermittently elevated  She continues on ARB and amlodipine  I will adjust the medications so that she receives the losartan 100 mg each morning and amlodipine 5 mg each evening  Blood pressure this morning is elevated  We will follow the blood pressure today and tomorrow to decide on discharge dosing  She will need close follow up with the primary physician after discharge

## 2018-12-20 NOTE — PROGRESS NOTES
Progress Note - Jalen Farrell 8/7/1929, 80 y o  female MRN: [id number]    Unit/Bed#: Lak[facility] Prit[facility] 269-02 Encounter: [id number]    Primary Care Provider: Madiha Camacho MD   Date and time admitted to hospital: 12/13/2018  4:25 PM        Abnormality of gait   Assessment & Plan    Her gait is abnormal with diminished ground clearance and occasional balance impairment  There is also abnormally narrow base of support  She is able to improve this with verbal cues  We will proceed with full program to her gait quality and reduce fall risk  Her gait is improving with verbal cues and practice  Gastroesophageal reflux   Assessment & Plan    She continues on the proton pump inhibitor for GERD symptoms  Hypertensive urgency   Assessment & Plan    Her blood pressure was markedly elevated on admission  It is intermittently elevated  She continues on ARB and amlodipine  I will adjust the medications so that she receives the losartan 100 mg each morning and amlodipine 5 mg each evening  Blood pressure this morning is elevated  We will follow the blood pressure today and tomorrow to decide on discharge dosing  She will need close follow up with the primary physician after discharge  Transient cerebral ischemia   Assessment & Plan    The patient had a TIA which led to the admission to the hospital   She is currently on aspirin and Plavix and statin for prevention  We will closely monitor for symptoms and blood pressure for cerebral perfusion  * Physical deconditioning   Assessment & Plan    Patient was noted to have impaired mobility and balance and self-care during the acute hospitalization  She was admitted to the acute inpatient rehabilitation to address these issues  At home, she is normally able to negotiate a full flight of stairs independently and walks independently with a walker  She currently needs cues for safety and gait quality    Will proceed with a comprehensive rehabilitation program to improve gait quality and balance and safety  Subjective/Objective     Subjective: The patient states that she feels well today  She reports good appetite and fair sleep  She reports adequate bowel and bladder function  She denies any other significant ENT, ophthalmological, cardiac, pulmonary, GI, , hematologic, dermatologic, psychiatric, neurologic, and musculoskeletal difficulties other than those consistent with the past medical history and history of present illness  Objective:  Vitals: Blood pressure 169/73, pulse 58, temperature 97 5 °F (36 4 °C), temperature source Temporal, resp  rate 16, height 5' 2" (1 575 m), weight 51 kg (112 lb 8 oz), SpO2 98 %  ,Body mass index is 20 58 kg/m²  No intake or output data in the 24 hours ending 12/20/18 1159    Invasive Devices          No matching active lines, drains, or airways          Physical Exam: Vital signs are reviewed  Blood pressure is elevated  In general, she is a well-developed and thin individual sitting in the wheelchair  Head is normocephalic and atraumatic  Neck has functional range of motion  Mucous membranes are moist   Cardiac rhythm is regular without ectopy  Lungs are clear without wheeze or rhonchi  Abdomen is soft and nontender  Bowel sounds are present  Skin examination reveals no local pressure or breakdown  Limb examination reveals no edema  Both calves are nontender  Neurologic examination reveals slight facial asymmetry but otherwise there is generally preserved strength and coordination and sensation  Lab, Imaging and other studies: I have personally reviewed pertinent reports  VTE Pharmacologic Prophylaxis: Enoxaparin (Lovenox)  VTE Mechanical Prophylaxis: reason for no mechanical VTE prophylaxis She receives Lovenox and is ambulatory with nursing and therapies

## 2018-12-21 VITALS
HEIGHT: 62 IN | HEART RATE: 53 BPM | DIASTOLIC BLOOD PRESSURE: 68 MMHG | RESPIRATION RATE: 16 BRPM | OXYGEN SATURATION: 97 % | TEMPERATURE: 98.6 F | BODY MASS INDEX: 20.7 KG/M2 | WEIGHT: 112.5 LBS | SYSTOLIC BLOOD PRESSURE: 161 MMHG

## 2018-12-21 RX ADMIN — ASPIRIN 81 MG 81 MG: 81 TABLET ORAL at 08:44

## 2018-12-21 RX ADMIN — LOSARTAN POTASSIUM 100 MG: 50 TABLET, FILM COATED ORAL at 08:45

## 2018-12-21 RX ADMIN — PANTOPRAZOLE SODIUM 20 MG: 20 TABLET, DELAYED RELEASE ORAL at 06:09

## 2018-12-21 RX ADMIN — VITAMIN D, TAB 1000IU (100/BT) 1000 UNITS: 25 TAB at 08:45

## 2018-12-21 RX ADMIN — CLOPIDOGREL BISULFATE 75 MG: 75 TABLET ORAL at 08:44

## 2018-12-21 NOTE — NURSING NOTE
Instructions reviewed with pt and son, medication education included in discharge packet  All questions answered  Safety precautions reviewed

## 2019-01-16 DIAGNOSIS — R79.89 LOW VITAMIN D LEVEL: ICD-10-CM

## 2019-01-16 RX ORDER — CHOLECALCIFEROL (VITAMIN D3) 25 MCG
CAPSULE ORAL
Qty: 30 CAPSULE | Refills: 2 | Status: SHIPPED | OUTPATIENT
Start: 2019-01-16 | End: 2019-04-21 | Stop reason: SDUPTHER

## 2019-01-23 ENCOUNTER — OFFICE VISIT (OUTPATIENT)
Dept: FAMILY MEDICINE CLINIC | Facility: CLINIC | Age: 84
End: 2019-01-23
Payer: MEDICARE

## 2019-01-23 VITALS
WEIGHT: 113 LBS | BODY MASS INDEX: 20.02 KG/M2 | HEIGHT: 63 IN | DIASTOLIC BLOOD PRESSURE: 62 MMHG | RESPIRATION RATE: 22 BRPM | SYSTOLIC BLOOD PRESSURE: 130 MMHG | OXYGEN SATURATION: 99 % | HEART RATE: 62 BPM | TEMPERATURE: 97.9 F

## 2019-01-23 DIAGNOSIS — R26.9 GAIT ABNORMALITY: ICD-10-CM

## 2019-01-23 DIAGNOSIS — G45.9 TRANSIENT CEREBRAL ISCHEMIA, UNSPECIFIED TYPE: ICD-10-CM

## 2019-01-23 DIAGNOSIS — M85.80 OSTEOPENIA, UNSPECIFIED LOCATION: ICD-10-CM

## 2019-01-23 DIAGNOSIS — E11.8 TYPE 2 DIABETES MELLITUS WITH COMPLICATION, WITH LONG-TERM CURRENT USE OF INSULIN (HCC): ICD-10-CM

## 2019-01-23 DIAGNOSIS — N18.9 CHRONIC RENAL FAILURE, UNSPECIFIED CKD STAGE: ICD-10-CM

## 2019-01-23 DIAGNOSIS — R82.89 ABNORMAL URINE CYTOLOGY: ICD-10-CM

## 2019-01-23 DIAGNOSIS — K21.9 GASTROESOPHAGEAL REFLUX DISEASE, ESOPHAGITIS PRESENCE NOT SPECIFIED: ICD-10-CM

## 2019-01-23 DIAGNOSIS — I16.0 HYPERTENSIVE URGENCY: Primary | ICD-10-CM

## 2019-01-23 DIAGNOSIS — I49.1 PREMATURE ATRIAL BEATS: ICD-10-CM

## 2019-01-23 DIAGNOSIS — K86.9 PANCREATIC LESION: ICD-10-CM

## 2019-01-23 DIAGNOSIS — Z79.4 TYPE 2 DIABETES MELLITUS WITH COMPLICATION, WITH LONG-TERM CURRENT USE OF INSULIN (HCC): ICD-10-CM

## 2019-01-23 PROBLEM — R31.29 MICROSCOPIC HEMATURIA: Status: ACTIVE | Noted: 2019-01-23

## 2019-01-23 PROBLEM — E11.9 TYPE 2 DIABETES MELLITUS WITHOUT COMPLICATION, WITH LONG-TERM CURRENT USE OF INSULIN (HCC): Status: ACTIVE | Noted: 2019-01-23

## 2019-01-23 PROCEDURE — 99214 OFFICE O/P EST MOD 30 MIN: CPT | Performed by: FAMILY MEDICINE

## 2019-01-23 RX ORDER — LOSARTAN POTASSIUM 100 MG/1
100 TABLET ORAL DAILY
Refills: 0
Start: 2019-01-23 | End: 2019-01-24 | Stop reason: SDUPTHER

## 2019-01-23 RX ORDER — CLOPIDOGREL BISULFATE 75 MG/1
75 TABLET ORAL DAILY
Refills: 0
Start: 2019-01-23 | End: 2019-01-24 | Stop reason: SDUPTHER

## 2019-01-23 RX ORDER — ATORVASTATIN CALCIUM 40 MG/1
40 TABLET, FILM COATED ORAL EVERY EVENING
Refills: 0
Start: 2019-01-23 | End: 2019-01-24 | Stop reason: SDUPTHER

## 2019-01-23 RX ORDER — ACETAMINOPHEN 325 MG/1
325 TABLET ORAL EVERY 6 HOURS PRN
COMMUNITY

## 2019-01-23 RX ORDER — PANTOPRAZOLE SODIUM 20 MG/1
20 TABLET, DELAYED RELEASE ORAL
Start: 2019-01-23 | End: 2019-01-24 | Stop reason: SDUPTHER

## 2019-01-23 RX ORDER — AMLODIPINE BESYLATE 5 MG/1
5 TABLET ORAL
Qty: 30 TABLET | Refills: 0
Start: 2019-01-23 | End: 2019-01-24 | Stop reason: SDUPTHER

## 2019-01-23 NOTE — PROGRESS NOTES
Assessment/Plan:          Diagnoses and all orders for this visit:    Hypertensive urgency  Comments:  A pressure is controlled today   to follow with the dash diet  Orders:  -     Discontinue: amLODIPine (NORVASC) 5 mg tablet; Take 1 tablet (5 mg total) by mouth daily after dinner  -     Discontinue: losartan (COZAAR) 100 MG tablet; Take 1 tablet (100 mg total) by mouth daily  -     Discontinue: amLODIPine (NORVASC) 5 mg tablet; Take 1 tablet (5 mg total) by mouth daily after dinner  -     Discontinue: losartan (COZAAR) 100 MG tablet; Take 1 tablet (100 mg total) by mouth daily    Chronic renal failure, unspecified CKD stage  Comments:  Avoid NSAIDs    Osteopenia, unspecified location  Comments:  Fall prevention discussed  Weight-bearing exercise discussed calcium and vitamin-D supplement discussed  At check bone density  Patient declined  Abnormal urine cytology  Comments:  Patient declined  workup    Pancreatic lesion  Comments:  Check abdominal CT scan  Patient declined    Gait abnormality  Comments:  Recommend physical therapy  Patient declined    Premature atrial beats  Comments:  Asymptomatic    Type 2 diabetes mellitus with complication, with long-term current use of insulin (HCC)  Comments:  ckeck urine microalbumin     See Ophthalmology for routine eye care    Transient cerebral ischemia, unspecified type  -     Discontinue: atorvastatin (LIPITOR) 40 mg tablet; Take 1 tablet (40 mg total) by mouth every evening  -     Discontinue: clopidogrel (PLAVIX) 75 mg tablet; Take 1 tablet (75 mg total) by mouth daily  -     Discontinue: atorvastatin (LIPITOR) 40 mg tablet; Take 1 tablet (40 mg total) by mouth every evening  -     Discontinue: clopidogrel (PLAVIX) 75 mg tablet; Take 1 tablet (75 mg total) by mouth daily    Gastroesophageal reflux disease, esophagitis presence not specified  Comments:  Controlled  Orders:  -     Discontinue: pantoprazole (PROTONIX) 20 mg tablet;  Take 1 tablet (20 mg total) by mouth daily in the early morning  -     Discontinue: pantoprazole (PROTONIX) 20 mg tablet; Take 1 tablet (20 mg total) by mouth daily in the early morning    Other orders  -     acetaminophen (TYLENOL) 325 mg tablet; Take 325 mg by mouth every 6 (six) hours as needed for mild pain            Subjective:     Patient ID: Radha Villa is a 80 y o  female      Post hospitalization  Patient is here with her daughter  Per her daughter  On December 10 she called her mom  And her mom on the phone did not make any sense  On she took her to the ER  Her blood pressure was really high  Patient was admitted  Speech is back to normal   Patient denied headache or dizziness  Taking a blood pressure medication on the daily basis without side effect  Diabetes  Denied polyuria or polydipsia she is not taking any medication  Hyperlipidemia admit to regular fat intake  Not taking any medication  Chronic renal failure denied edema  Pancreatic lesion  Denied abdominal pain  Hospital record  Discharge summary for admission 12, 10, 2018 and rehab summary noted  CT scan of the head, MRI of the head MRA of the head / carotid, echo/Doppler, EKG in and labs on with her last admission all noted and reviewed results        Review of Systems   Constitutional: Negative for activity change, appetite change, chills, fatigue, fever and unexpected weight change  HENT: Negative for congestion, ear pain, sinus pressure and sore throat  Eyes: Negative for visual disturbance  Respiratory: Negative for cough, chest tightness, shortness of breath and wheezing  Cardiovascular: Negative for chest pain, palpitations and leg swelling  Gastrointestinal: Negative for abdominal pain, blood in stool, constipation, diarrhea, nausea and vomiting  Genitourinary: Negative for dysuria, frequency, hematuria and urgency  Musculoskeletal: Negative for arthralgias, back pain, gait problem, joint swelling, myalgias and neck pain     Skin: Negative for rash  Neurological: Negative for dizziness, tremors, seizures, syncope, weakness, light-headedness and headaches  Hematological: Negative for adenopathy  Does not bruise/bleed easily  Psychiatric/Behavioral: Negative for behavioral problems, confusion, dysphoric mood and sleep disturbance  Objective:     Physical Exam   Constitutional: She is oriented to person, place, and time  She appears well-developed and well-nourished  No distress  Sitting in wheel chair   HENT:   Head: Normocephalic and atraumatic  Eyes: Pupils are equal, round, and reactive to light  Conjunctivae and EOM are normal  No scleral icterus  Neck: Neck supple  No JVD present  No thyromegaly present  Cardiovascular: Normal rate, regular rhythm and normal heart sounds  No murmur heard  Extremities  No edema   Pulmonary/Chest: Effort normal and breath sounds normal    Abdominal: Soft  Bowel sounds are normal  She exhibits no distension and no mass  There is no tenderness  There is no rebound and no guarding  No hernia  Lymphadenopathy:     She has no cervical adenopathy  Neurological: She is alert and oriented to person, place, and time  No cranial nerve deficit  She exhibits normal muscle tone  Coordination normal    Skin: No rash noted  Psychiatric: She has a normal mood and affect   Her behavior is normal  Judgment normal

## 2019-01-24 DIAGNOSIS — I16.0 HYPERTENSIVE URGENCY: ICD-10-CM

## 2019-01-24 DIAGNOSIS — G45.9 TRANSIENT CEREBRAL ISCHEMIA, UNSPECIFIED TYPE: ICD-10-CM

## 2019-01-24 RX ORDER — ATORVASTATIN CALCIUM 40 MG/1
40 TABLET, FILM COATED ORAL EVERY EVENING
Refills: 0
Start: 2019-01-24 | End: 2019-01-24 | Stop reason: SDUPTHER

## 2019-01-24 RX ORDER — CLOPIDOGREL BISULFATE 75 MG/1
75 TABLET ORAL DAILY
Qty: 30 TABLET | Refills: 2
Start: 2019-01-24 | End: 2019-01-25 | Stop reason: SDUPTHER

## 2019-01-24 RX ORDER — AMLODIPINE BESYLATE 5 MG/1
5 TABLET ORAL
Qty: 30 TABLET | Refills: 0
Start: 2019-01-24 | End: 2019-01-24 | Stop reason: SDUPTHER

## 2019-01-24 RX ORDER — AMLODIPINE BESYLATE 5 MG/1
5 TABLET ORAL
Qty: 30 TABLET | Refills: 2
Start: 2019-01-24 | End: 2019-01-25 | Stop reason: SDUPTHER

## 2019-01-24 RX ORDER — LOSARTAN POTASSIUM 100 MG/1
100 TABLET ORAL DAILY
Refills: 0
Start: 2019-01-24 | End: 2019-01-24 | Stop reason: SDUPTHER

## 2019-01-24 RX ORDER — PANTOPRAZOLE SODIUM 20 MG/1
20 TABLET, DELAYED RELEASE ORAL
Start: 2019-01-24 | End: 2019-01-25 | Stop reason: SDUPTHER

## 2019-01-24 RX ORDER — CLOPIDOGREL BISULFATE 75 MG/1
75 TABLET ORAL DAILY
Refills: 0
Start: 2019-01-24 | End: 2019-01-24 | Stop reason: SDUPTHER

## 2019-01-24 RX ORDER — ATORVASTATIN CALCIUM 40 MG/1
40 TABLET, FILM COATED ORAL EVERY EVENING
Qty: 30 TABLET | Refills: 2
Start: 2019-01-24 | End: 2019-01-25 | Stop reason: SDUPTHER

## 2019-01-24 RX ORDER — LOSARTAN POTASSIUM 100 MG/1
100 TABLET ORAL DAILY
Qty: 30 TABLET | Refills: 2
Start: 2019-01-24 | End: 2019-01-25 | Stop reason: SDUPTHER

## 2019-01-25 DIAGNOSIS — K21.9 GASTROESOPHAGEAL REFLUX DISEASE, ESOPHAGITIS PRESENCE NOT SPECIFIED: ICD-10-CM

## 2019-01-25 DIAGNOSIS — I16.0 HYPERTENSIVE URGENCY: ICD-10-CM

## 2019-01-25 DIAGNOSIS — G45.9 TRANSIENT CEREBRAL ISCHEMIA, UNSPECIFIED TYPE: ICD-10-CM

## 2019-01-25 PROBLEM — I49.1 PREMATURE ATRIAL BEATS: Status: ACTIVE | Noted: 2019-01-25

## 2019-01-25 RX ORDER — CLOPIDOGREL BISULFATE 75 MG/1
75 TABLET ORAL DAILY
Qty: 90 TABLET | Refills: 3 | Status: SHIPPED | OUTPATIENT
Start: 2019-01-25

## 2019-01-25 RX ORDER — ATORVASTATIN CALCIUM 40 MG/1
40 TABLET, FILM COATED ORAL EVERY EVENING
Qty: 90 TABLET | Refills: 3 | Status: SHIPPED | OUTPATIENT
Start: 2019-01-25 | End: 2019-10-29 | Stop reason: SDUPTHER

## 2019-01-25 RX ORDER — AMLODIPINE BESYLATE 5 MG/1
5 TABLET ORAL
Qty: 90 TABLET | Refills: 3 | Status: SHIPPED | OUTPATIENT
Start: 2019-01-25

## 2019-01-25 RX ORDER — LOSARTAN POTASSIUM 100 MG/1
100 TABLET ORAL DAILY
Qty: 90 TABLET | Refills: 3 | Status: SHIPPED | OUTPATIENT
Start: 2019-01-25 | End: 2019-10-04 | Stop reason: HOSPADM

## 2019-01-25 RX ORDER — PANTOPRAZOLE SODIUM 20 MG/1
20 TABLET, DELAYED RELEASE ORAL DAILY
Qty: 90 TABLET | Refills: 1 | Status: SHIPPED | OUTPATIENT
Start: 2019-01-25 | End: 2019-07-22 | Stop reason: SDUPTHER

## 2019-01-25 NOTE — TELEPHONE ENCOUNTER
Pts med were never sent to the pharmacy  Pt missed her medication yesterday   Please send to pharmacy

## 2019-04-14 DIAGNOSIS — R10.13 EPIGASTRIC PAIN: ICD-10-CM

## 2019-04-14 RX ORDER — PANTOPRAZOLE SODIUM 40 MG/1
TABLET, DELAYED RELEASE ORAL
Qty: 90 TABLET | Refills: 1 | OUTPATIENT
Start: 2019-04-14

## 2019-04-21 DIAGNOSIS — R79.89 LOW VITAMIN D LEVEL: ICD-10-CM

## 2019-04-22 RX ORDER — CHOLECALCIFEROL (VITAMIN D3) 25 MCG
CAPSULE ORAL
Qty: 30 CAPSULE | Refills: 2 | Status: SHIPPED | OUTPATIENT
Start: 2019-04-22 | End: 2019-08-08 | Stop reason: SDUPTHER

## 2019-07-22 DIAGNOSIS — K21.9 GASTROESOPHAGEAL REFLUX DISEASE, ESOPHAGITIS PRESENCE NOT SPECIFIED: ICD-10-CM

## 2019-07-22 RX ORDER — PANTOPRAZOLE SODIUM 20 MG/1
20 TABLET, DELAYED RELEASE ORAL DAILY
Qty: 90 TABLET | Refills: 1 | Status: SHIPPED | OUTPATIENT
Start: 2019-07-22

## 2019-07-22 NOTE — TELEPHONE ENCOUNTER
Saundra Henderson called in for a refill  I told her she will need to make an appointment at some point  She will talk to her daughter and call back to schedule

## 2019-08-08 DIAGNOSIS — R79.89 LOW VITAMIN D LEVEL: ICD-10-CM

## 2019-09-30 ENCOUNTER — HOSPITAL ENCOUNTER (INPATIENT)
Facility: HOSPITAL | Age: 84
LOS: 4 days | Discharge: NURSING, REHAB OR LONG-TERM CARE | DRG: 392 | End: 2019-10-04
Attending: EMERGENCY MEDICINE | Admitting: INTERNAL MEDICINE
Payer: MEDICARE

## 2019-09-30 ENCOUNTER — APPOINTMENT (EMERGENCY)
Dept: RADIOLOGY | Facility: HOSPITAL | Age: 84
DRG: 392 | End: 2019-09-30
Payer: MEDICARE

## 2019-09-30 ENCOUNTER — APPOINTMENT (INPATIENT)
Dept: CT IMAGING | Facility: HOSPITAL | Age: 84
DRG: 392 | End: 2019-09-30
Payer: MEDICARE

## 2019-09-30 ENCOUNTER — APPOINTMENT (EMERGENCY)
Dept: CT IMAGING | Facility: HOSPITAL | Age: 84
DRG: 392 | End: 2019-09-30
Payer: MEDICARE

## 2019-09-30 DIAGNOSIS — R55 SYNCOPE: Primary | ICD-10-CM

## 2019-09-30 DIAGNOSIS — R93.0 ABNORMAL CT SCAN, SINUS: ICD-10-CM

## 2019-09-30 DIAGNOSIS — R19.7 DIARRHEA: ICD-10-CM

## 2019-09-30 DIAGNOSIS — S92.355A CLOSED NONDISPLACED FRACTURE OF FIFTH METATARSAL BONE OF LEFT FOOT, INITIAL ENCOUNTER: ICD-10-CM

## 2019-09-30 DIAGNOSIS — R11.10 VOMITING: ICD-10-CM

## 2019-09-30 DIAGNOSIS — S92.345A CLOSED NONDISPLACED FRACTURE OF FOURTH METATARSAL BONE OF LEFT FOOT, INITIAL ENCOUNTER: ICD-10-CM

## 2019-09-30 PROBLEM — I10 ESSENTIAL HYPERTENSION: Chronic | Status: ACTIVE | Noted: 2019-09-30

## 2019-09-30 PROBLEM — K52.9 GASTROENTERITIS, ACUTE: Status: ACTIVE | Noted: 2019-09-30

## 2019-09-30 PROBLEM — S92.343A CLOSED FRACTURE OF FOURTH METATARSAL BONE: Status: ACTIVE | Noted: 2019-09-30

## 2019-09-30 PROBLEM — J32.9 FUNGAL SINUSITIS: Status: ACTIVE | Noted: 2019-09-30

## 2019-09-30 PROBLEM — B49 FUNGAL SINUSITIS: Status: ACTIVE | Noted: 2019-09-30

## 2019-09-30 PROBLEM — D72.829 LEUKOCYTOSIS: Status: ACTIVE | Noted: 2019-09-30

## 2019-09-30 LAB
ALBUMIN SERPL BCP-MCNC: 3.9 G/DL (ref 3.5–5)
ALP SERPL-CCNC: 84 U/L (ref 46–116)
ALT SERPL W P-5'-P-CCNC: 16 U/L (ref 12–78)
ANION GAP SERPL CALCULATED.3IONS-SCNC: 11 MMOL/L (ref 4–13)
AST SERPL W P-5'-P-CCNC: 18 U/L (ref 5–45)
BASOPHILS # BLD AUTO: 0.05 THOUSANDS/ΜL (ref 0–0.1)
BASOPHILS NFR BLD AUTO: 0 % (ref 0–1)
BILIRUB SERPL-MCNC: 0.56 MG/DL (ref 0.2–1)
BUN SERPL-MCNC: 15 MG/DL (ref 5–25)
CALCIUM SERPL-MCNC: 9.5 MG/DL (ref 8.3–10.1)
CHLORIDE SERPL-SCNC: 104 MMOL/L (ref 100–108)
CO2 SERPL-SCNC: 25 MMOL/L (ref 21–32)
CREAT SERPL-MCNC: 0.75 MG/DL (ref 0.6–1.3)
EOSINOPHIL # BLD AUTO: 0.02 THOUSAND/ΜL (ref 0–0.61)
EOSINOPHIL NFR BLD AUTO: 0 % (ref 0–6)
ERYTHROCYTE [DISTWIDTH] IN BLOOD BY AUTOMATED COUNT: 13.2 % (ref 11.6–15.1)
GFR SERPL CREATININE-BSD FRML MDRD: 70 ML/MIN/1.73SQ M
GLUCOSE SERPL-MCNC: 156 MG/DL (ref 65–140)
HCT VFR BLD AUTO: 43.5 % (ref 34.8–46.1)
HGB BLD-MCNC: 14.3 G/DL (ref 11.5–15.4)
IMM GRANULOCYTES # BLD AUTO: 0.07 THOUSAND/UL (ref 0–0.2)
IMM GRANULOCYTES NFR BLD AUTO: 0 % (ref 0–2)
LIPASE SERPL-CCNC: 77 U/L (ref 73–393)
LYMPHOCYTES # BLD AUTO: 2 THOUSANDS/ΜL (ref 0.6–4.47)
LYMPHOCYTES NFR BLD AUTO: 12 % (ref 14–44)
MCH RBC QN AUTO: 32.7 PG (ref 26.8–34.3)
MCHC RBC AUTO-ENTMCNC: 32.9 G/DL (ref 31.4–37.4)
MCV RBC AUTO: 100 FL (ref 82–98)
MONOCYTES # BLD AUTO: 0.9 THOUSAND/ΜL (ref 0.17–1.22)
MONOCYTES NFR BLD AUTO: 5 % (ref 4–12)
NEUTROPHILS # BLD AUTO: 13.72 THOUSANDS/ΜL (ref 1.85–7.62)
NEUTS SEG NFR BLD AUTO: 83 % (ref 43–75)
NRBC BLD AUTO-RTO: 0 /100 WBCS
PLATELET # BLD AUTO: 275 THOUSANDS/UL (ref 149–390)
PMV BLD AUTO: 8.9 FL (ref 8.9–12.7)
POTASSIUM SERPL-SCNC: 3.6 MMOL/L (ref 3.5–5.3)
PROT SERPL-MCNC: 7.5 G/DL (ref 6.4–8.2)
RBC # BLD AUTO: 4.37 MILLION/UL (ref 3.81–5.12)
SODIUM SERPL-SCNC: 140 MMOL/L (ref 136–145)
TROPONIN I SERPL-MCNC: <0.02 NG/ML
WBC # BLD AUTO: 16.76 THOUSAND/UL (ref 4.31–10.16)

## 2019-09-30 PROCEDURE — 70450 CT HEAD/BRAIN W/O DYE: CPT

## 2019-09-30 PROCEDURE — 73502 X-RAY EXAM HIP UNI 2-3 VIEWS: CPT

## 2019-09-30 PROCEDURE — 29515 APPLICATION SHORT LEG SPLINT: CPT | Performed by: EMERGENCY MEDICINE

## 2019-09-30 PROCEDURE — 83690 ASSAY OF LIPASE: CPT | Performed by: EMERGENCY MEDICINE

## 2019-09-30 PROCEDURE — 93005 ELECTROCARDIOGRAM TRACING: CPT

## 2019-09-30 PROCEDURE — 96360 HYDRATION IV INFUSION INIT: CPT

## 2019-09-30 PROCEDURE — 99285 EMERGENCY DEPT VISIT HI MDM: CPT | Performed by: EMERGENCY MEDICINE

## 2019-09-30 PROCEDURE — 36415 COLL VENOUS BLD VENIPUNCTURE: CPT | Performed by: EMERGENCY MEDICINE

## 2019-09-30 PROCEDURE — 70486 CT MAXILLOFACIAL W/O DYE: CPT

## 2019-09-30 PROCEDURE — 85025 COMPLETE CBC W/AUTO DIFF WBC: CPT | Performed by: EMERGENCY MEDICINE

## 2019-09-30 PROCEDURE — 99285 EMERGENCY DEPT VISIT HI MDM: CPT

## 2019-09-30 PROCEDURE — 99223 1ST HOSP IP/OBS HIGH 75: CPT | Performed by: INTERNAL MEDICINE

## 2019-09-30 PROCEDURE — 80053 COMPREHEN METABOLIC PANEL: CPT | Performed by: EMERGENCY MEDICINE

## 2019-09-30 PROCEDURE — 73630 X-RAY EXAM OF FOOT: CPT

## 2019-09-30 PROCEDURE — 84484 ASSAY OF TROPONIN QUANT: CPT | Performed by: EMERGENCY MEDICINE

## 2019-09-30 RX ORDER — ONDANSETRON 2 MG/ML
1 INJECTION INTRAMUSCULAR; INTRAVENOUS ONCE
Status: COMPLETED | OUTPATIENT
Start: 2019-09-30 | End: 2019-09-30

## 2019-09-30 RX ORDER — SODIUM CHLORIDE 9 MG/ML
75 INJECTION, SOLUTION INTRAVENOUS CONTINUOUS
Status: DISCONTINUED | OUTPATIENT
Start: 2019-09-30 | End: 2019-10-03

## 2019-09-30 RX ORDER — MELATONIN
1000 DAILY
Status: DISCONTINUED | OUTPATIENT
Start: 2019-10-01 | End: 2019-10-01

## 2019-09-30 RX ORDER — ATORVASTATIN CALCIUM 40 MG/1
40 TABLET, FILM COATED ORAL EVERY EVENING
Status: DISCONTINUED | OUTPATIENT
Start: 2019-09-30 | End: 2019-10-04 | Stop reason: HOSPADM

## 2019-09-30 RX ORDER — ONDANSETRON 2 MG/ML
4 INJECTION INTRAMUSCULAR; INTRAVENOUS EVERY 6 HOURS PRN
Status: DISCONTINUED | OUTPATIENT
Start: 2019-09-30 | End: 2019-10-04 | Stop reason: HOSPADM

## 2019-09-30 RX ORDER — PANTOPRAZOLE SODIUM 20 MG/1
20 TABLET, DELAYED RELEASE ORAL
Status: DISCONTINUED | OUTPATIENT
Start: 2019-10-01 | End: 2019-10-04 | Stop reason: HOSPADM

## 2019-09-30 RX ORDER — ASPIRIN 81 MG/1
81 TABLET, CHEWABLE ORAL DAILY
Status: DISCONTINUED | OUTPATIENT
Start: 2019-10-01 | End: 2019-10-04 | Stop reason: HOSPADM

## 2019-09-30 RX ORDER — AMLODIPINE BESYLATE 5 MG/1
5 TABLET ORAL
Status: DISCONTINUED | OUTPATIENT
Start: 2019-10-01 | End: 2019-10-04 | Stop reason: HOSPADM

## 2019-09-30 RX ORDER — CLOPIDOGREL BISULFATE 75 MG/1
75 TABLET ORAL DAILY
Status: DISCONTINUED | OUTPATIENT
Start: 2019-10-01 | End: 2019-10-04 | Stop reason: HOSPADM

## 2019-09-30 RX ORDER — ACETAMINOPHEN 325 MG/1
650 TABLET ORAL EVERY 6 HOURS PRN
Status: DISCONTINUED | OUTPATIENT
Start: 2019-09-30 | End: 2019-10-04 | Stop reason: HOSPADM

## 2019-09-30 RX ORDER — OXYCODONE HYDROCHLORIDE 5 MG/1
2.5 TABLET ORAL EVERY 4 HOURS PRN
Status: DISCONTINUED | OUTPATIENT
Start: 2019-09-30 | End: 2019-10-04 | Stop reason: HOSPADM

## 2019-09-30 RX ADMIN — ONDANSETRON 4 MG: 2 INJECTION INTRAMUSCULAR; INTRAVENOUS at 22:53

## 2019-09-30 RX ADMIN — OXYCODONE HYDROCHLORIDE 2.5 MG: 5 TABLET ORAL at 21:25

## 2019-09-30 RX ADMIN — SODIUM CHLORIDE 500 ML: 0.9 INJECTION, SOLUTION INTRAVENOUS at 18:22

## 2019-09-30 RX ADMIN — SODIUM CHLORIDE 75 ML/HR: 0.9 INJECTION, SOLUTION INTRAVENOUS at 22:57

## 2019-09-30 RX ADMIN — ATORVASTATIN CALCIUM 40 MG: 40 TABLET, FILM COATED ORAL at 23:24

## 2019-09-30 NOTE — ED PROVIDER NOTES
History  Chief Complaint   Patient presents with    Syncope     patient has had multiple episodes of diarrhea and vomiting at home  Patient had 2 syncopal episodes while at home  patient cold and diaphoretic upon EMS arrival  denies CP and SOB  81 yo female with HTN, TIA, hyperlipidemia, brought by ambulance from home where she lives by herself, after passing out in her bathroom  She recalls having the urge to have bowel movement, where she passed loose, nonbloody stool, she felt lightheaded and like she was going to pass out as she was attempting to get herself up  She attempted to sit back down on the commode, but she apparently passed out  She doesn't think she had any prolonged LOC, says she actually heard the "thump" from hitting the floor, and was immediately awake  She took several minutes to get herself sitting up and then pulled herself up to stand  She had immediate pain of the left foot, managed to hobble to a phone and call family members, then EMS when her son came over  She says she keeps feeling that she needs to pass bowel movement, but has not any other diarrhea yet  However, she did vomit during EMS assessment and transport  She denies abdominal pain  She denies any recent sick contacts, no recent abx  History provided by:  Patient      Prior to Admission Medications   Prescriptions Last Dose Informant Patient Reported? Taking?    Cholecalciferol ([facility] D3) 1000 units capsule   No No   Sig: Take 1 capsule (1,000 Units total) by mouth daily   acetaminophen (TYLENOL) 325 mg tablet  Self Yes No   Sig: Take 325 mg by mouth every 6 (six) hours as needed for mild pain   amLODIPine (NORVASC) 5 mg tablet   No Yes   Sig: Take 1 tablet (5 mg total) by mouth daily after dinner   aspirin 81 mg chewable tablet   No Yes   Sig: Chew 1 tablet (81 mg total) daily   atorvastatin (LIPITOR) 40 mg tablet   No Yes   Sig: Take 1 tablet (40 mg total) by mouth every evening   clopidogrel (PLAVIX) 75 mg tablet   No Yes   Sig: Take 1 tablet (75 mg total) by mouth daily   losartan (COZAAR) 100 MG tablet   No Yes   Sig: Take 1 tablet (100 mg total) by mouth daily   pantoprazole (PROTONIX) 20 mg tablet   No Yes   Sig: Take 1 tablet (20 mg total) by mouth daily      Facility-Administered Medications: None       Past Medical History:   Diagnosis Date    Abnormal gait     Chronic renal failure     Diabetes mellitus (HCC)     GERD (gastroesophageal reflux disease)     H  pylori infection     Hypertension     Hypertensive urgency     Pancreatic lesion     Premature atrial beats     Renal disorder     TIA (transient ischemic attack)        Past Surgical History:   Procedure Laterality Date    CHOLECYSTECTOMY         Family History   Problem Relation Age of Onset    Hypertension Father      I have reviewed and agree with the history as documented  Social History     Tobacco Use    Smoking status: Never Smoker    Smokeless tobacco: Never Used   Substance Use Topics    Alcohol use: No    Drug use: No        Review of Systems   Constitutional: Negative for appetite change, chills and fever  HENT: Negative for sore throat  Respiratory: Negative for cough, shortness of breath and wheezing  Cardiovascular: Negative for chest pain and palpitations  Gastrointestinal: Positive for diarrhea and nausea  Negative for abdominal pain and vomiting  Genitourinary: Negative for dysuria and hematuria  Musculoskeletal: Positive for gait problem (left foot pain)  Negative for neck pain  Skin: Negative for rash  Neurological: Positive for syncope and light-headedness  Negative for dizziness, weakness and headaches  Psychiatric/Behavioral: Negative for suicidal ideas  All other systems reviewed and are negative  Physical Exam  Physical Exam   Constitutional: She is oriented to person, place, and time  She appears well-developed and well-nourished  No distress     Elderly and frail c/w recorded age HENT:   Head: Normocephalic and atraumatic  Right Ear: External ear normal    Left Ear: External ear normal    Nose: Nose normal    Eyes: Pupils are equal, round, and reactive to light  Conjunctivae and EOM are normal    Neck: Normal range of motion  Neck supple  Cardiovascular: Normal rate and regular rhythm  Pulmonary/Chest: Effort normal    Abdominal: Soft  Musculoskeletal: Normal range of motion  Feet:    Neurological: She is alert and oriented to person, place, and time  She has normal strength  Abnormal gait: not tested  GCS eye subscore is 4  GCS verbal subscore is 5  GCS motor subscore is 6  Skin: Skin is warm and dry  Capillary refill takes less than 2 seconds  No rash noted  She is not diaphoretic  Psychiatric: She has a normal mood and affect  Her behavior is normal  Judgment and thought content normal    Nursing note and vitals reviewed        Vital Signs  ED Triage Vitals [09/30/19 1752]   Temperature Pulse Respirations Blood Pressure SpO2   97 8 °F (36 6 °C) 60 20 149/67 97 %      Temp Source Heart Rate Source Patient Position - Orthostatic VS BP Location FiO2 (%)   Oral Monitor Lying Right arm --      Pain Score       2           Vitals:    09/30/19 1752   BP: 149/67   Pulse: 60   Patient Position - Orthostatic VS: Lying         Visual Acuity      ED Medications  Medications   ondansetron (FOR EMS ONLY) (ZOFRAN) 4 mg/2 mL injection 4 mg (0 mg Does not apply Given to EMS 9/30/19 1815)   sodium chloride 0 9 % bolus 500 mL (0 mL Intravenous Stopped 9/30/19 1938)       Diagnostic Studies  Results Reviewed     Procedure Component Value Units Date/Time    Troponin I [[id number]]  (Normal) Collected:  09/30/19 1818    Lab Status:  Final result Specimen:  Blood from Arm, Left Updated:  09/30/19 1842     Troponin I <0 02 ng/mL     Comprehensive metabolic panel [[id number]]  (Abnormal) Collected:  09/30/19 1818    Lab Status:  Final result Specimen:  Blood from Arm, Left Updated:  09/30/19 1840     Sodium 140 mmol/L      Potassium 3 6 mmol/L      Chloride 104 mmol/L      CO2 25 mmol/L      ANION GAP 11 mmol/L      BUN 15 mg/dL      Creatinine 0 75 mg/dL      Glucose 156 mg/dL      Calcium 9 5 mg/dL      AST 18 U/L      ALT 16 U/L      Alkaline Phosphatase 84 U/L      Total Protein 7 5 g/dL      Albumin 3 9 g/dL      Total Bilirubin 0 56 mg/dL      eGFR 70 ml/min/1 73sq m     Narrative:       National Kid[employer] Disease Foundation guidelines for Chronic Kidney Disease (CKD):     Stage 1 with normal or high GFR (GFR > 90 mL/min/1 73 square meters)    Stage 2 Mild CKD (GFR = 60-89 mL/min/1 73 square meters)    Stage 3A Moderate CKD (GFR = 45-59 mL/min/1 73 square meters)    Stage 3B Moderate CKD (GFR = 30-44 mL/min/1 73 square meters)    Stage 4 Severe CKD (GFR = 15-29 mL/min/1 73 square meters)    Stage 5 End Stage CKD (GFR <15 mL/min/1 73 square meters)  Note: GFR calculation is accurate only with a steady state creatinine    Lipase [[id number]]  (Normal) Collected:  09/30/19 1818    Lab Status:  Final result Specimen:  Blood from Arm, Left Updated:  09/30/19 1840     Lipase 77 u/L     CBC and differential [[id number]]  (Abnormal) Collected:  09/30/19 1818    Lab Status:  Final result Specimen:  Blood from Arm, Left Updated:  09/30/19 1825     WBC 16 76 Thousand/uL      RBC 4 37 Million/uL      Hemoglobin 14 3 g/dL      Hematocrit 43 5 %       fL      MCH 32 7 pg      MCHC 32 9 g/dL      RDW 13 2 %      MPV 8 9 fL      Platelets 434 Thousands/uL      nRBC 0 /100 WBCs      Neutrophils Relative 83 %      Immat GRANS % 0 %      Lymphocytes Relative 12 %      Monocytes Relative 5 %      Eosinophils Relative 0 %      Basophils Relative 0 %      Neutrophils Absolute 13 72 Thousands/µL      Immature Grans Absolute 0 07 Thousand/uL      Lymphocytes Absolute 2 00 Thousands/µL      Monocytes Absolute 0 90 Thousand/µL      Eosinophils Absolute 0 02 Thousand/µL      Basophils Absolute 0 05 Thousands/µL XR foot 3+ views LEFT   ED Interpretation by Fernanda Haro MD (09/30 1905)   4th and 5th metatarsal fracture      CT head without contrast   Final Result by Nena Carter MD (09/30 1907)      No acute intracranial abnormality  Mild chronic small vessel ischemic changes and volume loss  Opacification of the right maxillary sinus with hyperdense material which can be seen with fungal infection  The remainder the visualized paranasal sinuses are well-aerated  Workstation performed: [id number]         XR hip/pelv 2-3 vws left    (Results Pending)              Procedures  ECG 12 Lead Documentation Only  Date/Time: 9/30/2019 6:00 PM  Performed by: Fernanda Haro MD  Authorized by: Fernanda Haro MD     Quality:     Tracing quality:  Limited by artifact  Rate:     ECG rate:  62  Rhythm:     Rhythm: sinus bradycardia    ST segments:     ST segments:  Non-specific    Splint application  Date/Time: 9/30/2019 8:02 PM  Performed by: Fernanda Haro MD  Authorized by: Fernanda Haro MD     Procedure performed by emergency physician: No    Consent:     Consent obtained:  Verbal    Consent given by:  Patient  Universal protocol:     Patient identity confirmed:  Verbally with patient  Indication:     Indications: fracture    Pre-procedure details:     Sensation:  Normal  Procedure details:     Laterality:  Left    Location:  Foot    Foot:  L foot    Splint type:  Short leg    Supplies:  Ortho-Glass  Post-procedure details:     Pain:  Improved    Sensation:  Normal    Neurovascular Exam: skin pink, capillary refill <2 sec, normal pulses and skin intact, warm, and dry      Patient tolerance of procedure:   Tolerated well, no immediate complications           ED Course  ED Course as of Sep 30 2004   Mon Sep 30, 2019   1904 4th and 5th metatarsal fracture   XR foot 3+ views LEFT   1906 No fracture   XR hip/pelv 2-3 vws left   1927 D/W podiatry resident, who agrees with splint in ED, and consult during admission for additional recommendations                                  MDM    Disposition  Final diagnoses:   Syncope   Diarrhea   Vomiting   Closed nondisplaced fracture of fifth metatarsal bone of left foot, initial encounter   Closed nondisplaced fracture of fourth metatarsal bone of left foot, initial encounter     Time reflects when diagnosis was documented in both MDM as applicable and the Disposition within this note     Time User Action Codes Description Comment    9/30/2019  7:28 PM Merrily Lauren Add [R55] Syncope     9/30/2019  7:28 PM Earney Points L Add [R19 7] Diarrhea     9/30/2019  7:28 PM Merrily Lauren Add [R11 10] Vomiting     9/30/2019  7:29 PM Merrily Lauren Add [S92 355A] Nondisplaced fracture of fifth metatarsal bone, left foot, initial encounter for closed fracture     9/30/2019  7:32 PM [place], [address] Closed nondisplaced fracture of fifth metatarsal bone of left foot, initial encounter     9/30/2019  7:32 PM [place], [address] [facility] Nondisplaced fracture of fifth metatarsal bone, left foot, initial encounter for closed fracture     9/30/2019  7:32 PM Merrily Lauren Add [J19 987L] Metatarsal fracture     9/30/2019  7:33 PM Merrily Lauren Remove [S92 309A] Metatarsal fracture     9/30/2019  7:33 PM Merrily Lauren Add [S92 345A] Closed nondisplaced fracture of fourth metatarsal bone of left foot, initial encounter       ED Disposition     ED Disposition Condition Date/Time Comment    Admit Stable Mon Sep 30, 2019  7:32 PM Case was discussed with Honor Sandifer and the patient's admission status was agreed to be Admission Status: inpatient status to the service of Dr Karen Singh   Follow-up Information    None         Patient's Medications   Discharge Prescriptions    No medications on file     No discharge procedures on file      ED Provider  Electronically Signed by           Manjit Mark MD  09/30/19 2004

## 2019-09-30 NOTE — ASSESSMENT & PLAN NOTE
Maybe vasovagal vs dehydration from emesis/diarrhea  CT: Neg for acute intracranial abnormality  Mild chronic small vessel ischemic changes and volume loss    Orthostatic VS  Monitor on telemetry

## 2019-09-30 NOTE — ASSESSMENT & PLAN NOTE
WBC 16 7, may be reactive  CT: shows opacification of the right maxillary sinus with hyperdense material which can be seen with fungal infection  Will obtain CT sinus  Monitor for fever or s/s infection  Monitor CBC

## 2019-09-30 NOTE — H&P
H&P- Breann Springmonttery 8/7/1929, 80 y o  female MRN: [id number]    Unit/Bed#: ED 17 Encounter: [id number]    Primary Care Provider: Jean-Paul Brito MD   Date and time admitted to hospital: 9/30/2019  5:50 PM      * Syncope and collapse  Assessment & Plan  Maybe vasovagal vs dehydration from emesis/diarrhea  CT: Neg for acute intracranial abnormality  Mild chronic small vessel ischemic changes and volume loss  Orthostatic VS  Monitor on telemetry    Gastroenteritis, acute  Assessment & Plan  Reports nausea, emesis and diarrhea  Will obtain stool studies and C Diff   Soft diet; advance as tolerated  IV hyration  Prn antiemetics    Closed fracture of fourth metatarsal bone  Assessment & Plan  L foot XR: preliminary report shows 4th and 5th metatarsal fracture; f/u official report  XR hip/pelvis pending  NWB L foot  PT OT consult  Ice packs; analgesics  Podiatry consult    Essential hypertension  Assessment & Plan  Continue amlodipine    Leukocytosis  Assessment & Plan  WBC 16 7, may be reactive  CT: shows opacification of the right maxillary sinus with hyperdense material which can be seen with fungal infection  Will obtain CT sinus  Monitor for fever or s/s infection  Monitor CBC    Type 2 diabetes mellitus with complication, with long-term current use of insulin (HCC)  Assessment & Plan    Lab Results   Component Value Date    HGBA1C 5 8 12/11/2018       Gastroesophageal reflux  Assessment & Plan  Stable on Protonix    VTE Prophylaxis: Enoxaparin (Lovenox)  / foot pump applied and reason for no mechanical VTE prophylaxis ac   Code Status: DNR/I  POLST: POLST is not applicable to this patient  Discussion with family:     Anticipated Length of Stay:  Patient will be admitted on an Inpatient basis with an anticipated length of stay of  > 2 midnights  Justification for Hospital Stay: syncope; metatarsal fx    Total Time for Visit, including Counseling / Coordination of Care: 45 minutes    Greater than 50% of this total time spent on direct patient counseling and coordination of care  Chief Complaint:   fainted    History of Present Illness:    Arvind Goodson is a 80 y o  female who presents with c/o syncope and collapse  Patient states she thinks she ate something that disagreed with her, today she felt ill to her stomach, feels nauseous, had 2 episodes of emesis and diarrhea x4  She went to the bathroom, sat on the commode, felt faint and heard herself fall to the floor; she remembers being seated on the floor with her head against the wall, she sat for a while then managed to get herself up  Shortly after she felt abdominal pain, nauseous, hot, cold and diaphoretic, vomited, her son arrived at that moment  States she didn't have much of an appetite today, only ate tea and muffin  CT shows possible fungal sinusitis; ROS positive for sneezing and clear rhinorrhea; denies sinus or facial pain, fever, visual changes or changes in mentation  Denies recent abx use of hospitalization; denies fever, cough, SOB, hematuria or dysuria  Review of Systems:    Review of Systems   Constitutional: Positive for chills  Negative for appetite change and fever  HENT: Positive for rhinorrhea and sneezing  Negative for sinus pressure, sinus pain and sore throat  Respiratory: Negative  Cardiovascular: Negative  Gastrointestinal: Positive for diarrhea, nausea and vomiting  Negative for abdominal pain  Genitourinary: Negative  Musculoskeletal: Positive for arthralgias  L foot pain   Neurological: Positive for syncope  Negative for dizziness and headaches         Past Medical and Surgical History:     Past Medical History:   Diagnosis Date    Abnormal gait     Chronic renal failure     Diabetes mellitus (HCC)     GERD (gastroesophageal reflux disease)     H  pylori infection     Hypertension     Hypertensive urgency     Pancreatic lesion     Premature atrial beats     Renal disorder     TIA (transient ischemic attack)        Past Surgical History:   Procedure Laterality Date    CHOLECYSTECTOMY         Meds/Allergies:    Prior to Admission medications    Medication Sig Start Date End Date Taking? Authorizing Provider   amLODIPine (NORVASC) 5 mg tablet Take 1 tablet (5 mg total) by mouth daily after dinner 1/25/19  Yes Shayy Burton MD   aspirin 81 mg chewable tablet Chew 1 tablet (81 mg total) daily 12/14/18  Yes Huang Davis DO   atorvastatin (LIPITOR) 40 mg tablet Take 1 tablet (40 mg total) by mouth every evening 1/25/19  Yes Shayy Burton MD   clopidogrel (PLAVIX) 75 mg tablet Take 1 tablet (75 mg total) by mouth daily 1/25/19  Yes Shayy Burton MD   losartan (COZAAR) 100 MG tablet Take 1 tablet (100 mg total) by mouth daily 1/25/19  Yes Shayy Burton MD   pantoprazole (PROTONIX) 20 mg tablet Take 1 tablet (20 mg total) by mouth daily 7/22/19  Yes Shayy Burton MD   acetaminophen (TYLENOL) 325 mg tablet Take 325 mg by mouth every 6 (six) hours as needed for mild pain    Historical Provider, MD   Cholecalciferol (CVS D3) 1000 units capsule Take 1 capsule (1,000 Units total) by mouth daily 8/8/19   Shayy Burton MD     I have reviewed home medications with patient personally  Allergies: Allergies   Allergen Reactions    Acetaminophen     Azithromycin Diarrhea    Pioglitazone     Quinapril     Sitagliptin        Social History:     Marital Status:     Occupation: Retired  Patient Pre-hospital Living Situation: resides at home alone  Patient Pre-hospital Level of Mobility: walker  Patient Pre-hospital Diet Restrictions:   Substance Use History:   Social History     Substance and Sexual Activity   Alcohol Use No     Social History     Tobacco Use   Smoking Status Never Smoker   Smokeless Tobacco Never Used     Social History     Substance and Sexual Activity   Drug Use No       Family History:    Family History   Problem Relation Age of Onset    Hypertension Father        Physical Exam:     Vitals:   Blood Pressure: 149/67 (09/30/19 1752)  Pulse: 60 (09/30/19 1752)  Temperature: 97 8 °F (36 6 °C) (09/30/19 1752)  Temp Source: Oral (09/30/19 1752)  Respirations: 20 (09/30/19 1752)  Weight - Scale: 52 6 kg (115 lb 15 4 oz) (09/30/19 1752)  SpO2: 97 % (09/30/19 1752)    Physical Exam   Constitutional: She is oriented to person, place, and time  She appears well-developed and well-nourished  No distress  HENT:   Head: Normocephalic and atraumatic  [place]; eyeglasses   Neck: Neck supple  Cardiovascular: Normal rate, regular rhythm, normal heart sounds and intact distal pulses  No murmur heard  Pulmonary/Chest: Effort normal and breath sounds normal  No stridor  No respiratory distress  She has no wheezes  She has no rales  Abdominal: Soft  Bowel sounds are normal  She exhibits no distension  There is no tenderness  There is no guarding  Musculoskeletal:   L foot soft splint   Neurological: She is alert and oriented to person, place, and time  Skin: She is not diaphoretic  No pallor  Psychiatric: She has a normal mood and affect  Her behavior is normal  Judgment and thought content normal      Additional Data:     Lab Results: I have personally reviewed pertinent reports  Results from last 7 days   Lab Units 09/30/19  1818   WBC Thousand/uL 16 76*   HEMOGLOBIN g/dL 14 3   HEMATOCRIT % 43 5   PLATELETS Thousands/uL 275   NEUTROS PCT % 83*   LYMPHS PCT % 12*   MONOS PCT % 5   EOS PCT % 0     Results from last 7 days   Lab Units 09/30/19  1818   SODIUM mmol/L 140   POTASSIUM mmol/L 3 6   CHLORIDE mmol/L 104   CO2 mmol/L 25   BUN mg/dL 15   CREATININE mg/dL 0 75   ANION GAP mmol/L 11   CALCIUM mg/dL 9 5   ALBUMIN g/dL 3 9   TOTAL BILIRUBIN mg/dL 0 56   ALK PHOS U/L 84   ALT U/L 16   AST U/L 18   GLUCOSE RANDOM mg/dL 156*                       Imaging: I have personally reviewed pertinent reports        XR foot 3+ views LEFT   ED Interpretation by Manjit Mark MD (09/30 1905)   4th and 5th metatarsal fracture      CT head without contrast   Final Result by Gayle Gruber MD (09/30 1907)      No acute intracranial abnormality  Mild chronic small vessel ischemic changes and volume loss  Opacification of the right maxillary sinus with hyperdense material which can be seen with fungal infection  The remainder the visualized paranasal sinuses are well-aerated  Workstation performed: [id number]         XR hip/pelv 2-3 vws left    (Results Pending)       EKG, Pathology, and Other Studies Reviewed on Admission:   · EKG: XR CT    Allscripts / Epic Records Reviewed: Yes     ** Please Note: This note has been constructed using a voice recognition system   **

## 2019-09-30 NOTE — ASSESSMENT & PLAN NOTE
L foot XR: preliminary report shows 4th and 5th metatarsal fracture; f/u official report  XR hip/pelvis pending  NWB L foot  PT OT consult  Ice packs; analgesics  Podiatry consult

## 2019-10-01 PROBLEM — R93.0 ABNORMAL CT SCAN, SINUS: Status: ACTIVE | Noted: 2019-10-01

## 2019-10-01 PROBLEM — R00.1 SINUS BRADYCARDIA: Status: ACTIVE | Noted: 2019-10-01

## 2019-10-01 LAB
ANION GAP SERPL CALCULATED.3IONS-SCNC: 7 MMOL/L (ref 4–13)
BASOPHILS # BLD AUTO: 0.02 THOUSANDS/ΜL (ref 0–0.1)
BASOPHILS NFR BLD AUTO: 0 % (ref 0–1)
BUN SERPL-MCNC: 13 MG/DL (ref 5–25)
C DIFF TOX GENS STL QL NAA+PROBE: NORMAL
CALCIUM SERPL-MCNC: 8.5 MG/DL (ref 8.3–10.1)
CAMPYLOBACTER DNA SPEC NAA+PROBE: NORMAL
CHLORIDE SERPL-SCNC: 107 MMOL/L (ref 100–108)
CO2 SERPL-SCNC: 26 MMOL/L (ref 21–32)
CREAT SERPL-MCNC: 0.7 MG/DL (ref 0.6–1.3)
EOSINOPHIL # BLD AUTO: 0 THOUSAND/ΜL (ref 0–0.61)
EOSINOPHIL NFR BLD AUTO: 0 % (ref 0–6)
ERYTHROCYTE [DISTWIDTH] IN BLOOD BY AUTOMATED COUNT: 13.3 % (ref 11.6–15.1)
GFR SERPL CREATININE-BSD FRML MDRD: 77 ML/MIN/1.73SQ M
GLUCOSE SERPL-MCNC: 131 MG/DL (ref 65–140)
HCT VFR BLD AUTO: 35 % (ref 34.8–46.1)
HGB BLD-MCNC: 11.7 G/DL (ref 11.5–15.4)
IMM GRANULOCYTES # BLD AUTO: 0.02 THOUSAND/UL (ref 0–0.2)
IMM GRANULOCYTES NFR BLD AUTO: 0 % (ref 0–2)
LYMPHOCYTES # BLD AUTO: 0.77 THOUSANDS/ΜL (ref 0.6–4.47)
LYMPHOCYTES NFR BLD AUTO: 8 % (ref 14–44)
MCH RBC QN AUTO: 33.1 PG (ref 26.8–34.3)
MCHC RBC AUTO-ENTMCNC: 33.4 G/DL (ref 31.4–37.4)
MCV RBC AUTO: 99 FL (ref 82–98)
MONOCYTES # BLD AUTO: 0.55 THOUSAND/ΜL (ref 0.17–1.22)
MONOCYTES NFR BLD AUTO: 6 % (ref 4–12)
NEUTROPHILS # BLD AUTO: 8.7 THOUSANDS/ΜL (ref 1.85–7.62)
NEUTS SEG NFR BLD AUTO: 86 % (ref 43–75)
NRBC BLD AUTO-RTO: 0 /100 WBCS
PLATELET # BLD AUTO: 207 THOUSANDS/UL (ref 149–390)
PMV BLD AUTO: 8.9 FL (ref 8.9–12.7)
POTASSIUM SERPL-SCNC: 3.8 MMOL/L (ref 3.5–5.3)
RBC # BLD AUTO: 3.54 MILLION/UL (ref 3.81–5.12)
SALMONELLA DNA SPEC QL NAA+PROBE: NORMAL
SHIGA TOXIN STX GENE SPEC NAA+PROBE: NORMAL
SHIGELLA DNA SPEC QL NAA+PROBE: NORMAL
SODIUM SERPL-SCNC: 140 MMOL/L (ref 136–145)
WBC # BLD AUTO: 10.06 THOUSAND/UL (ref 4.31–10.16)

## 2019-10-01 PROCEDURE — 80048 BASIC METABOLIC PNL TOTAL CA: CPT | Performed by: NURSE PRACTITIONER

## 2019-10-01 PROCEDURE — 87493 C DIFF AMPLIFIED PROBE: CPT | Performed by: NURSE PRACTITIONER

## 2019-10-01 PROCEDURE — 97163 PT EVAL HIGH COMPLEX 45 MIN: CPT

## 2019-10-01 PROCEDURE — G8979 MOBILITY GOAL STATUS: HCPCS

## 2019-10-01 PROCEDURE — 87505 NFCT AGENT DETECTION GI: CPT | Performed by: NURSE PRACTITIONER

## 2019-10-01 PROCEDURE — 85025 COMPLETE CBC W/AUTO DIFF WBC: CPT | Performed by: NURSE PRACTITIONER

## 2019-10-01 PROCEDURE — 97167 OT EVAL HIGH COMPLEX 60 MIN: CPT

## 2019-10-01 PROCEDURE — 99232 SBSQ HOSP IP/OBS MODERATE 35: CPT | Performed by: INTERNAL MEDICINE

## 2019-10-01 PROCEDURE — G8978 MOBILITY CURRENT STATUS: HCPCS

## 2019-10-01 PROCEDURE — G8988 SELF CARE GOAL STATUS: HCPCS

## 2019-10-01 PROCEDURE — G8987 SELF CARE CURRENT STATUS: HCPCS

## 2019-10-01 RX ADMIN — OXYCODONE HYDROCHLORIDE 2.5 MG: 5 TABLET ORAL at 01:46

## 2019-10-01 RX ADMIN — ASPIRIN 81 MG 81 MG: 81 TABLET ORAL at 08:01

## 2019-10-01 RX ADMIN — ENOXAPARIN SODIUM 40 MG: 40 INJECTION SUBCUTANEOUS at 08:01

## 2019-10-01 RX ADMIN — CLOPIDOGREL BISULFATE 75 MG: 75 TABLET ORAL at 08:01

## 2019-10-01 RX ADMIN — VITAMIN D, TAB 1000IU (100/BT) 1000 UNITS: 25 TAB at 08:01

## 2019-10-01 RX ADMIN — PANTOPRAZOLE SODIUM 20 MG: 20 TABLET, DELAYED RELEASE ORAL at 06:31

## 2019-10-01 RX ADMIN — AMLODIPINE BESYLATE 5 MG: 5 TABLET ORAL at 17:02

## 2019-10-01 RX ADMIN — ATORVASTATIN CALCIUM 40 MG: 40 TABLET, FILM COATED ORAL at 17:02

## 2019-10-01 NOTE — ASSESSMENT & PLAN NOTE
Reports nausea, emesis and diarrhea  Will obtain stool studies and C Diff   Soft diet; advance as tolerated  IV hyration  Prn antiemetics

## 2019-10-01 NOTE — SOCIAL WORK
CM met with pt & son at bedside to discuss discharge needs  Pt lives in a house by herself; has a good support system  ADL's are completed independently  Pt uses a RW and also owns a BSC  PCP identified as Dr Dwayne Gaytan  Pharmacy identified as [facility]   Pt reports hx of STR at [address]  CM provided son and pt a list of SNF's  Pt's son reports that they might be looking into LTC  Son will look at list with family and will let CM know of choices  CM provided son with CM's contact information  CM continuing to follow for discharge needs

## 2019-10-01 NOTE — PLAN OF CARE
Problem: OCCUPATIONAL THERAPY ADULT  Goal: Performs self-care activities at highest level of function for planned discharge setting  See evaluation for individualized goals  Description  Treatment Interventions: ADL retraining, Functional transfer training, UE strengthening/ROM, Endurance training, Cognitive reorientation, Patient/family training, Equipment evaluation/education, Compensatory technique education, Continued evaluation          See flowsheet documentation for full assessment, interventions and recommendations  Note:   Limitation: Decreased ADL status, Decreased UE ROM, Decreased UE strength, Decreased endurance, Decreased high-level ADLs  Prognosis: Good  Assessment: Pt is a 89y/o female admitted to the hospital after falling at home while walking to the bathroom  Pt noted with L 4th/5th metatarsal fx; podiatry allowing WBAT with sx shoe  PTA pt states independence with all aspects of her ADLs, transfers, ambulation--with RW; neg , +falls=1  During initial eval, pt demonstrated deficits with her functional balance, functional mobility, ADL status, activity tolerance(currently fair=15-20mins), transfer safety, b/l UE strength, and questionable cognition(i e problem-solving)  Pt would benefit from continued OT tx for the above deficits  3-5xwk/1-2wks        OT Discharge Recommendation: Short Term Rehab

## 2019-10-01 NOTE — ASSESSMENT & PLAN NOTE
Due to vasovagal syncope related to acute gastroenteritis with nausea/vomiting/diarrhea  CT head negative for stroke  Telemetry with sinus bradycardia but no malignant arrhythmia  Resolved  Activity as tolerated with PT  DC telemetry

## 2019-10-01 NOTE — PLAN OF CARE
Problem: PHYSICAL THERAPY ADULT  Goal: Performs mobility at highest level of function for planned discharge setting  See evaluation for individualized goals  Description  Treatment/Interventions: Functional transfer training, LE strengthening/ROM, Elevations, Therapeutic exercise, Endurance training, Patient/family training, Equipment eval/education, Bed mobility, Gait training, Compensatory technique education, Continued evaluation, Spoke to nursing, OT  Equipment Recommended: Ramez Ernst       See flowsheet documentation for full assessment, interventions and recommendations  Note:   Prognosis: Good  Problem List: Decreased strength, Decreased endurance, Impaired balance, Decreased mobility, Decreased range of motion, Pain, Orthopedic restrictions  Assessment: Pt is 79 y/o female admitted p syncope and fall with L foot 4th and 5th metatarsal nondisplaced fx  WBAT in surgical shoe per podiatry  Prior to admission resides alone in 2 story home with 1st floor set up  Ambulating with RW primarily household distances  Currently presents with functional limitations related to L foot pain, decreased ankle ROM, decreased LLE strength  Increased time and need for Emilia for bed mobility, transfers and ambulation with RW support for short distances  Gait slowed, step to pattern, antalgic with RW support  Increased risk for falls related to impairments as previously listed  As pt lives alone and requiring assistance for all mobility will require STR at d/c to address impairments and optimize functional outcomes  PT will follow and progress  Barriers to Discharge: Inaccessible home environment, Decreased caregiver support     Recommendation: Short-term skilled PT     PT - OK to Discharge: Yes(to rhb, No to home when medically stable )    See flowsheet documentation for full assessment

## 2019-10-01 NOTE — CONSULTS
Consultation - ENT   Ervin Brian 80 y o  female MRN: [id number]  Unit/Bed#: E2 -01 Encounter: [id number]      Assessment/Plan     Assessment:  1  Chronic right maxillary sinusitis - likely fungal in origin - not related to malar infection  2  Right periapical lucency - possibly associated with malar infection/non healing ulcer  3  Right cheek lesion/non healing ulcer - unsure if this is representative of primary skin malignancy or secondary to periapical issue  Plan:  Await dental ([facility]) input  I suspect that the upper periapical issue will require therapy  Once treated she may follow up in my office to discuss the right maxillary sinus issue and skin lesion  I would likely recommend right sided endoscopic sinus surgery to open the sinus and obtain a culture  She must be cleared medically prior to this procedure  At this time no further antibiotic therapy needed for this issue  The skin lesion has been present for the past year with recurrence of crust formation  I have told her that this could represent a primary skin cancer or it may be associated with a low grade infection from her dental issue  If the dental issue is treated (or needs to be treated) and the lesion does not heal she will require biopsy - she will likely require a shave biopsy in either case since this will not heal short term  I could complete this in the office setting when she is seen  I discussed this with the patient and her son who is the power of   We will contact her son next week to arrange follow up  History of Present Illness   Physician Requesting Consult: Diana Oliveira MD  Reason for Consult / Principal Problem: right maxillary sinusitis  HPI: Ervin Brian is a 80y o  year old female who presents with syncope  During her work up in the ER she had a CT of the sinuses and head    This demonstrated some abnormal findings including a periapical abscess with erosion of the anterior face of the maxilla and also chronic findings of the maxillary sinus with calcifications and complete opacification with thickening of the bone of the sinus  She denies any acute sinus issues but she has been dealing with a non healing infection of the right malar area  She states that the lesion on the right cheek has been present for one year with recurrent crusting  She denies any pain in the area or swelling in the area  She also denies any sinus issues at all  She has a history of poor dentition  Consults    Review of Systems   Constitutional: Negative for activity change, appetite change, fatigue, fever and unexpected weight change  HENT: Negative for congestion, ear discharge, ear pain, hearing loss, nosebleeds, postnasal drip, rhinorrhea, sinus pressure, sinus pain, sneezing, sore throat, tinnitus, trouble swallowing and voice change  Eyes: Negative  Negative for photophobia, pain, itching and visual disturbance  Respiratory: Negative  Negative for cough, chest tightness and shortness of breath  Cardiovascular: Negative  Negative for chest pain  Gastrointestinal: Negative  Negative for abdominal pain  Endocrine: Negative  Musculoskeletal: Negative  Negative for gait problem, neck pain and neck stiffness  Skin: Positive for wound (non healing wound of the right cheek)  Allergic/Immunologic: Negative  Negative for environmental allergies  Neurological: Negative  Negative for dizziness, speech difficulty, light-headedness and headaches  Hematological: Negative  Negative for adenopathy  Psychiatric/Behavioral: Negative  Negative for sleep disturbance  The patient is not nervous/anxious          Historical Information   Past Medical History:   Diagnosis Date    Abnormal gait     Chronic renal failure     Diabetes mellitus (HCC)     GERD (gastroesophageal reflux disease)     H  pylori infection     Hypertension     Hypertensive urgency     Pancreatic lesion     Premature atrial beats     Renal disorder     TIA (transient ischemic attack)      Past Surgical History:   Procedure Laterality Date    CHOLECYSTECTOMY       Social History   Social History     Substance and Sexual Activity   Alcohol Use No     Social History     Substance and Sexual Activity   Drug Use No     Social History     Tobacco Use   Smoking Status Never Smoker   Smokeless Tobacco Never Used     Family History:   Family History   Problem Relation Age of Onset    Hypertension Father        Meds/Allergies   current meds:   Current Facility-Administered Medications   Medication Dose Route Frequency    acetaminophen (TYLENOL) tablet 650 mg  650 mg Oral Q6H PRN    amLODIPine (NORVASC) tablet 5 mg  5 mg Oral After Dinner    aspirin chewable tablet 81 mg  81 mg Oral Daily    atorvastatin (LIPITOR) tablet 40 mg  40 mg Oral QPM    clopidogrel (PLAVIX) tablet 75 mg  75 mg Oral Daily    enoxaparin (LOVENOX) subcutaneous injection 40 mg  40 mg Subcutaneous Daily    ondansetron (ZOFRAN) injection 4 mg  4 mg Intravenous Q6H PRN    oxyCODONE (ROXICODONE) IR tablet 2 5 mg  2 5 mg Oral Q4H PRN    pantoprazole (PROTONIX) EC tablet 20 mg  20 mg Oral Early Morning    sodium chloride 0 9 % infusion  75 mL/hr Intravenous Continuous    and PTA meds:   Prior to Admission Medications   Prescriptions Last Dose Informant Patient Reported? Taking?    Cholecalciferol (CVS D3) 1000 units capsule   No No   Sig: Take 1 capsule (1,000 Units total) by mouth daily   acetaminophen (TYLENOL) 325 mg tablet  Self Yes No   Sig: Take 325 mg by mouth every 6 (six) hours as needed for mild pain   amLODIPine (NORVASC) 5 mg tablet   No Yes   Sig: Take 1 tablet (5 mg total) by mouth daily after dinner   aspirin 81 mg chewable tablet   No Yes   Sig: Chew 1 tablet (81 mg total) daily   atorvastatin (LIPITOR) 40 mg tablet   No Yes   Sig: Take 1 tablet (40 mg total) by mouth every evening   clopidogrel (PLAVIX) 75 mg tablet   No Yes   Sig: Take 1 tablet (75 mg total) by mouth daily   losartan (COZAAR) 100 MG tablet   No Yes   Sig: Take 1 tablet (100 mg total) by mouth daily   pantoprazole (PROTONIX) 20 mg tablet   No Yes   Sig: Take 1 tablet (20 mg total) by mouth daily      Facility-Administered Medications: None       Allergies   Allergen Reactions    Acetaminophen     Azithromycin Diarrhea    Pioglitazone     Quinapril     Sitagliptin        Objective       Intake/Output Summary (Last 24 hours) at 10/1/2019 1023  Last data filed at 9/30/2019 2257  Gross per 24 hour   Intake 500 ml   Output 200 ml   Net 300 ml       Invasive Devices     Peripheral Intravenous Line            Peripheral IV 09/30/19 Left Antecubital 1 day                Physical Exam   Constitutional: She appears well-developed and well-nourished  No distress  HENT:   Head: Normocephalic and atraumatic  Right Ear: Tympanic membrane, external ear and ear canal normal    Left Ear: Tympanic membrane, external ear and ear canal normal    Nose: Septal deviation (left sided) present  No sinus tenderness or nasal deformity  Right sinus exhibits no maxillary sinus tenderness and no frontal sinus tenderness  Left sinus exhibits no maxillary sinus tenderness and no frontal sinus tenderness  Mouth/Throat: Uvula is midline, oropharynx is clear and moist and mucous membranes are normal  No trismus in the jaw  Abnormal dentition (poor dentition - two uppers with decay - roots embedded within the mucosa  )  No oropharyngeal exudate, posterior oropharyngeal edema, posterior oropharyngeal erythema or tonsillar abscesses  Tonsils are 1+ on the right  Tonsils are 1+ on the left  No tonsillar exudate  Neck: Trachea normal, normal range of motion, full passive range of motion without pain and phonation normal  Neck supple  No tracheal tenderness and no muscular tenderness present  No tracheal deviation present  No thyroid mass present     Skin: Lesion (right anterior cheek with ulcerative lesion measuring about 5 mm in diameter with some surrounding erythema - no edema of surrounding tissues) noted  She is not diaphoretic  Lab Results:   CBC:   Lab Results   Component Value Date    WBC 10 06 10/01/2019    HGB 11 7 10/01/2019    HCT 35 0 10/01/2019    MCV 99 (H) 10/01/2019     10/01/2019    MCH 33 1 10/01/2019    MCHC 33 4 10/01/2019    RDW 13 3 10/01/2019    MPV 8 9 10/01/2019    NRBC 0 10/01/2019   , CMP:   Lab Results   Component Value Date    SODIUM 140 10/01/2019    K 3 8 10/01/2019     10/01/2019    CO2 26 10/01/2019    BUN 13 10/01/2019    CREATININE 0 70 10/01/2019    CALCIUM 8 5 10/01/2019    AST 18 09/30/2019    ALT 16 09/30/2019    ALKPHOS 84 09/30/2019    EGFR 77 10/01/2019   , Coags: No results found for: PT, PTT, INR  Imaging Studies: I have personally reviewed pertinent reports  EKG, Pathology, and Other Studies: I have personally reviewed pertinent films in PACS     Procedure: Xr Hip/pelv 2-3 Vws Left    Result Date: 10/1/2019  Narrative: LEFT HIP INDICATION:   injury  COMPARISON:  None VIEWS:  XR HIP/PELV 2-3 VWS LEFT  W PELVIS IF PERFORMED FINDINGS: There is no acute fracture or dislocation  Severe left hip osteoarthritis is seen  No lytic or blastic osseous lesions  There are atherosclerotic calcifications  Soft tissues are otherwise unremarkable  Degenerative changes pubic symphysis and visualized lower lumbar spine  Impression: No acute osseous abnormality  Degenerative changes as described  Workstation performed: [id number]     Procedure: Xr Foot 3+ Views Left    Result Date: 10/1/2019  Narrative: LEFT FOOT INDICATION:   injury  COMPARISON:  None VIEWS:  XR FOOT 3+ VW LEFT FINDINGS: Minimally displaced fractures of the distal right 4th and 5th metatarsals    Degenerative changes of the 1st MTP joint  No lytic or blastic lesions seen  Soft tissues are unremarkable       Impression: Minimally displaced fractures of the distal right 4th and 5th metatarsals Findings concur with the preliminary report by the referring clinician already in PACS and/or our electronic record EPIC  Workstation performed: [id number]     Procedure: Ct Head Without Contrast    Result Date: 9/30/2019  Narrative: CT BRAIN - WITHOUT CONTRAST INDICATION:   syncope, fall  COMPARISON:  CT brain dated December 10, 2018  TECHNIQUE:  CT examination of the brain was performed  In addition to axial images, coronal 2D reformatted images were created and submitted for interpretation  Radiation dose length product (DLP) for this visit:  883 mGy-cm   This examination, like all CT scans performed in the [facility], was performed utilizing techniques to minimize radiation dose exposure, including the use of iterative reconstruction and automated exposure control  IMAGE QUALITY:  Diagnostic  FINDINGS: PARENCHYMA:  No intracranial mass, mass effect or midline shift  No CT signs of acute infarction  No acute parenchymal hemorrhage  There is mild periventricular white matter low attenuation which is nonspecific and most likely related to chronic small vessel ischemic changes  VENTRICLES AND EXTRA-AXIAL SPACES:  There is prominence of the ventricles and sulci related to mild volume loss  VISUALIZED ORBITS AND PARANASAL SINUSES:  There is opacification of the right maxillary sinus with hyperdense material which can be seen with fungal infection  CALVARIUM AND EXTRACRANIAL SOFT TISSUES:  Normal      Impression: No acute intracranial abnormality  Mild chronic small vessel ischemic changes and volume loss  Opacification of the right maxillary sinus with hyperdense material which can be seen with fungal infection  The remainder the visualized paranasal sinuses are well-aerated  Workstation performed: [id number]     Procedure: Ct Sinus Wo Contrast    Result Date: 10/1/2019  Narrative: CT SINUSES INDICATION:   Sinusitis, patient with immunodeficiency  COMPARISON:  None   TECHNIQUE:  Axial CT imaging through the sinuses was performed  In addition, sagittal and coronal reformatted images were submitted for interpretation  Radiation dose length product (DLP) for this visit:  440 mGy-cm   This examination, like all CT scans performed in the [facility], was performed utilizing techniques to minimize radiation dose exposure, including the use of iterative reconstruction and automated exposure control  IMAGE QUALITY:  Diagnostic  FINDINGS: FRONTAL SINUSES:  Clear  ETHMOID AIR CELLS:  Clear  MAXILLARY SINUSES:  The right maxillary sinus is nearly completely opacified with central areas of curvilinear calcification  There is sclerosis and thickening of the maxillary sinus walls indicating chronic inflammation  No osseous erosion is identified  The left maxillary sinus is well aerated  SPHENOID SINUSES:  Clear  NASAL SEPTUM AND CAVITY:  Mild leftward nasal septal deviation without spurring  Normal nasal cavity  ANTERIOR OSTEOMEATAL COMPLEX:  Partial occlusion of the right maxillary ostia  The remaining components of the right ostiomeatal unit are patent  The left ostiomeatal unit is patent  OSSEOUS STRUCTURES:  No bony erosion or destruction  Normal cribriform plate and planum sphenoidale  Visualized mastoid temporal bones are clear  The bony walls of the carotid canals are intact  Markedly prominent torus palatini  SOFT TISSUES:  Normal  Small periapical abscess associated with the remnant right upper lateral incisor with focal erosion of the anterior cortex of the maxilla  There is no adjacent soft tissue phlegmon or fluid collection  Intracranial carotid artery atherosclerotic calcifications  Impression: Near-total opacification of the right maxillary sinus with sclerosis and thickening of the maxillary sinus walls suggesting chronic inflammation  The internal maxillary inflammatory tissue contains calcification which may represent inspissated secretions    An immunocompromised patient, fungal disease not excluded  Prominent torus palatini Right lateral incisor periapical abscess with focal erosion of  the buccal cortex of the maxilla   Workstation performed: [id number]     Code Status: Level 3 - DNAR and DNI  Advance Directive and Living Will:      Power of :    POLST:      None

## 2019-10-01 NOTE — CONSULTS
Consult - [address] [place] 80 y o  female MRN: [id number]  Unit/Bed#: E2 -01 Encounter: [id number]    Assessment/Plan     Assessment:  81yo F with nondisplaced left foot 4th and 5th metatarsal shaft fracture     Plan:  - left foot xray reviewed: nondisplaced 4th and 5th metatarsal shaft fractures  - applied compressive dressing to reduce swelling, recommend elevation and ice as needed  - WBAT in surgical shoe  - thank you for the consultation, we will sign off, please do not hesitate to call with questions/concerns    History of Present Illness     HPI:  Talon Stevenson is a 80 y o  female presents for evaluation of syncope event and some GI symptoms  Podiatry is consulted for left foot nondisplaced 4th and 5th metatarsal fracture noted on x-ray upon arrival from ED  Patient states she was on the commode, fainted and felt pain to her left foot immediately after getting up  She states she has some pain to her foot now but manageable  Inpatient consult to Podiatry     Performed by  Joseph Ambrosio DPM     Authorized by ANGI Saxena            Review of Systems   Constitutional: Negative  HENT: Negative  Eyes: Negative  Respiratory: Negative  Cardiovascular: Negative  Gastrointestinal: Negative  Musculoskeletal: left foot pain   Skin:negative   Neurological: Negative  Psych: negative         Historical Information   Past Medical History:   Diagnosis Date    Abnormal gait     Chronic renal failure     Diabetes mellitus (HCC)     GERD (gastroesophageal reflux disease)     H  pylori infection     Hypertension     Hypertensive urgency     Pancreatic lesion     Premature atrial beats     Renal disorder     TIA (transient ischemic attack)      Past Surgical History:   Procedure Laterality Date    CHOLECYSTECTOMY       Social History   Social History     Substance and Sexual Activity   Alcohol Use No     Social History     Substance and Sexual Activity Drug Use No     Social History     Tobacco Use   Smoking Status Never Smoker   Smokeless Tobacco Never Used     Family History:   Family History   Problem Relation Age of Onset    Hypertension Father        Meds/Allergies   Medications Prior to Admission   Medication    amLODIPine (NORVASC) 5 mg tablet    aspirin 81 mg chewable tablet    atorvastatin (LIPITOR) 40 mg tablet    clopidogrel (PLAVIX) 75 mg tablet    losartan (COZAAR) 100 MG tablet    pantoprazole (PROTONIX) 20 mg tablet    acetaminophen (TYLENOL) 325 mg tablet    Cholecalciferol (CVS D3) 1000 units capsule     Allergies   Allergen Reactions    Acetaminophen     Azithromycin Diarrhea    Pioglitazone     Quinapril     Sitagliptin        Objective   First Vitals:   Blood Pressure: 149/67 (09/30/19 1752)  Pulse: 60 (09/30/19 1752)  Temperature: 97 8 °F (36 6 °C) (09/30/19 1752)  Temp Source: Oral (09/30/19 1752)  Respirations: 20 (09/30/19 1752)  Weight - Scale: 52 6 kg (115 lb 15 4 oz) (09/30/19 1752)  SpO2: 97 % (09/30/19 1752)    Current Vitals:   Blood Pressure: 123/71 (10/01/19 0732)  Pulse: 75 (10/01/19 0732)  Temperature: 98 6 °F (37 °C) (10/01/19 0732)  Temp Source: Tympanic (10/01/19 0732)  Respirations: 16 (10/01/19 0732)  Weight - Scale: 50 6 kg (111 lb 8 8 oz) (09/30/19 2150)  SpO2: 96 % (10/01/19 0732)        /71 (BP Location: Right arm)   Pulse 75   Temp 98 6 °F (37 °C) (Tympanic)   Resp 16   Wt 50 6 kg (111 lb 8 8 oz)   SpO2 96%   BMI 20 08 kg/m²      General Appearance:    Alert, cooperative, no distress   Head:    Normocephalic, without obvious abnormality, atraumatic   Eyes:    PERRL, conjunctiva/corneas clear, EOM's intact        Nose:   Moist mucous membranes   Neck:   Supple, symmetrical, trachea midline   Back:     Symmetric   Lungs:     Respirations unlabored, clear to ascultations    Heart:    Regular rate and rhythm, S1 and S2 normal, no murmur, rub   or gallop   Abdomen:     Soft, non-tender     Lower Extremities     Dermatologic:    Vascular:   Skin is dry and atrophic, some ecchymosis noted dorsal lateral foot    Pedal pulses palpable   Musculoskeletal:   MSK function WNL, normal ankle ROM, dorsiflexion and plantarflexion 5/5 on left foot, pain with palpation to 4th and 5th metatarsals   Neurologic:   Gross sensation is intact  Protective sensation is inact             Lab Results:   Admission on 09/30/2019   Component Date Value    WBC 09/30/2019 16 76*    RBC 09/30/2019 4 37     Hemoglobin 09/30/2019 14 3     Hematocrit 09/30/2019 43 5     MCV 09/30/2019 100*    MCH 09/30/2019 32 7     MCHC 09/30/2019 32 9     RDW 09/30/2019 13 2     MPV 09/30/2019 8 9     Platelets 48/06/9004 275     nRBC 09/30/2019 0     Neutrophils Relative 09/30/2019 83*    Immat GRANS % 09/30/2019 0     Lymphocytes Relative 09/30/2019 12*    Monocytes Relative 09/30/2019 5     Eosinophils Relative 09/30/2019 0     Basophils Relative 09/30/2019 0     Neutrophils Absolute 09/30/2019 13 72*    Immature Grans Absolute 09/30/2019 0 07     Lymphocytes Absolute 09/30/2019 2 00     Monocytes Absolute 09/30/2019 0 90     Eosinophils Absolute 09/30/2019 0 02     Basophils Absolute 09/30/2019 0 05     Sodium 09/30/2019 140     Potassium 09/30/2019 3 6     Chloride 09/30/2019 104     CO2 09/30/2019 25     ANION GAP 09/30/2019 11     BUN 09/30/2019 15     Creatinine 09/30/2019 0 75     Glucose 09/30/2019 156*    Calcium 09/30/2019 9 5     AST 09/30/2019 18     ALT 09/30/2019 16     Alkaline Phosphatase 09/30/2019 84     Total Protein 09/30/2019 7 5     Albumin 09/30/2019 3 9     Total Bilirubin 09/30/2019 0 56     eGFR 09/30/2019 70     Troponin I 09/30/2019 <0 02     Lipase 09/30/2019 77     Sodium 10/01/2019 140     Potassium 10/01/2019 3 8     Chloride 10/01/2019 107     CO2 10/01/2019 26     ANION GAP 10/01/2019 7     BUN 10/01/2019 13     Creatinine 10/01/2019 0 70     Glucose 10/01/2019 131     Calcium 10/01/2019 8 5     eGFR 10/01/2019 77     WBC 10/01/2019 10 06     RBC 10/01/2019 3 54*    Hemoglobin 10/01/2019 11 7     Hematocrit 10/01/2019 35 0     MCV 10/01/2019 99*    MCH 10/01/2019 33 1     MCHC 10/01/2019 33 4     RDW 10/01/2019 13 3     MPV 10/01/2019 8 9     Platelets 71/42/4274 207     nRBC 10/01/2019 0     Neutrophils Relative 10/01/2019 86*    Immat GRANS % 10/01/2019 0     Lymphocytes Relative 10/01/2019 8*    Monocytes Relative 10/01/2019 6     Eosinophils Relative 10/01/2019 0     Basophils Relative 10/01/2019 0     Neutrophils Absolute 10/01/2019 8 70*    Immature Grans Absolute 10/01/2019 0 02     Lymphocytes Absolute 10/01/2019 0 77     Monocytes Absolute 10/01/2019 0 55     Eosinophils Absolute 10/01/2019 0 00     Basophils Absolute 10/01/2019 0 02                    Invalid input(s): LABAEARO            Imaging: I have personally reviewed pertinent films in PACS  EKG, Pathology, and Other Studies: I have personally reviewed pertinent reports  Code Status: Level 3 - DNAR and DNI      Portions of the record may have been created with voice recognition software  Occasional wrong word or "sound a like" substitutions may have occurred due to the inherent limitations of voice recognition software  Read the chart carefully and recognize, using context, where substitutions have occurred

## 2019-10-01 NOTE — ASSESSMENT & PLAN NOTE
Left 4th and 5th metatarsal fracture likely traumatic  Podiatry evaluation done, will not need surgery  PT OT consult  Ice packs; analgesics

## 2019-10-01 NOTE — PHYSICAL THERAPY NOTE
PT EVALUATION    80 y o     [id number]    Diarrhea [R19 7]  Syncope [R55]  Vomiting [R11 10]  Closed nondisplaced fracture of fourth metatarsal bone of left foot, initial encounter [S92 345A]  Closed nondisplaced fracture of fifth metatarsal bone of left foot, initial encounter [S92 355A]    Past Medical History:   Diagnosis Date    Abnormal gait     Chronic renal failure     Diabetes mellitus ([address] )     GERD (gastroesophageal reflux disease)     H  pylori infection     Hypertension     Hypertensive urgency     Pancreatic lesion     Premature atrial beats     Renal disorder     TIA (transient ischemic attack)          Past Surgical History:   Procedure Laterality Date    CHOLECYSTECTOMY            10/01/19 1106   Note Type   Note type Eval only   Pain Assessment   Pain Type Acute pain   Pain Location Foot   Pain Orientation Left   Hospital Pain Intervention(s) Repositioned; Ambulation/increased activity; Emotional support   Response to Interventions tolerated   Pain Rating: FLACC (Rest) - Face 0   Pain Rating: FLACC (Rest) - Legs 0   Pain Rating: FLACC (Rest) - Activity 0   Pain Rating: FLACC (Rest) - Cry 0   Pain Rating: FLACC (Rest) - Consolability 0   Score: FLACC (Rest) 0   Pain Rating: FLACC (Activity) - Face 1   Pain Rating: FLACC (Activity) - Legs 0   Pain Rating: FLACC (Activity) - Activity 1   Pain Rating: FLACC (Activity) - Cry 1   Pain Rating: FLACC (Activity) - Consolability 1   Score: FLACC (Activity) 4   Home Living   Type of Home House   Home Layout Multi-level; Able to live on main level with bedroom/bathroom;Stairs to enter with rails  (4 ALBER with rail )   Bathroom Shower/Tub Tub/shower unit  (also has walk in shower )   [facility]   Additional Comments resides alone in 2 story home with first floor set up      Prior Function   Level of [place] Independent with ADLs and functional mobility   Lives With [address] in the last 6 months 1 to 4  (1)   Vocational Retired   Comments I PTA with use of RW support in home  Restrictions/Precautions   LLE Weight Bearing Per Order WBAT  (in surgical shoe )   Braces or Orthoses Other (Comment)  (surgical shoe)   Other Precautions Chair Alarm; Bed Alarm;WBS;Fall Risk;Pain   General   Additional Pertinent History Pt is 79 y/o female with syncope and fall with + 4th and 5th metatarsal fx  Podiatry consulted  WBAT in surgical shoe  Family/Caregiver Present No   Cognition   Overall Cognitive Status WFL   Arousal/Participation Cooperative   Orientation Level Oriented to person;Oriented to place;Oriented to time   Following Commands Follows one step commands without difficulty   Comments [place]   RUE Assessment   RUE Assessment   (see OT notes, proximal limitations observed)   LUE Assessment   LUE Assessment   (see OT notes )   RLE Assessment   RLE Assessment WFL  (grossly 4/5)   LLE Assessment   LLE Assessment X  (grossly 4-/5, ankle 3/5)   Light Touch   RLE Light Touch Grossly intact   LLE Light Touch Grossly intact   Bed Mobility   Supine to Sit 4  Minimal assistance   Additional items Assist x 1; Increased time required;Verbal cues   Sit to Supine 4  Minimal assistance   Additional items Assist x 1; Increased time required;Verbal cues   Additional Comments cues for hand placement with transitoins needed  Transfers   Sit to Stand 4  Minimal assistance   Additional items Assist x 1; Increased time required;Verbal cues   Stand to Sit 4  Minimal assistance   Additional items Assist x 1; Increased time required;Verbal cues  (cues for hand placement )   Additional Comments cues for hand placement  Ambulation/Elevation   Gait pattern Improper Weight shift;Decreased foot clearance; Antalgic; Short stride; Step to; Inconsistent kishore; Excessively slow;Decreased L stance   Gait Assistance 4 Minimal assist   Additional items Assist x 1;Verbal cues; Tactile cues   Assistive Device Rolling walker   Distance Amb with RW 10'x1  Increased time  Cues for RW management needed  Balance   Static Sitting Fair +   Dynamic Sitting Fair   Static Standing Poor +   Dynamic Standing Poor   Ambulatory Poor   Endurance Deficit   Endurance Deficit Yes   Endurance Deficit Description fatigue, weakness, pain  Activity Tolerance   Activity Tolerance Patient limited by fatigue;Patient limited by pain   Medical Staff Made Aware nurse, Mercedez Banks OT-Jesus Manuel   Nurse Made Aware yes  Assessment   Prognosis Good   Problem List Decreased strength;Decreased endurance; Impaired balance;Decreased mobility; Decreased range of motion;Pain;Orthopedic restrictions   Assessment Pt is 81 y/o female admitted p syncope and fall with L foot 4th and 5th metatarsal nondisplaced fx  WBAT in surgical shoe per podiatry  Prior to admission resides alone in 2 story home with 1st floor set up  Ambulating with RW primarily household distances  Currently presents with functional limitations related to L foot pain, decreased ankle ROM, decreased LLE strength  Increased time and need for Emilia for bed mobility, transfers and ambulation with RW support for short distances  Gait slowed, step to pattern, antalgic with RW support  Increased risk for falls related to impairments as previously listed  As pt lives alone and requiring assistance for all mobility will require STR at d/c to address impairments and optimize functional outcomes  PT will follow and progress  Barriers to Discharge Inaccessible home environment;Decreased caregiver support   Goals   Patient Goals get better   STG Expiration Date 10/11/19   Short Term Goal #1 10 days :1)  Pt will perform bed mobility with Gildardo demonstrating appropriate technique 100% of the time in order to improve function  2)  Perform all transfers with Gildardo demonstrating safe and appropriate technique 100% of the time in order to improve ability to negotiate safely in home environment  3) Amb with least restrictive AD > 150'x1 with mod I in order to demonstrate ability to negotiate in home environment  4)  Improve overall strength and balance 1/2 grade in order to optimize ability to perform functional tasks and reduce fall risk  5) Increase activity tolerance to 45 minutes in order to improve endurance to functional tasks  6)  Negotiate stairs using most appropriate technique and S in order to be able to negotiate safely in home environment  7) PT for ongoing patient and family/caregiver education, DME needs and d/c planning in order to promote highest level of function in least restrictive environment  Plan   Treatment/Interventions Functional transfer training;LE strengthening/ROM; Elevations; Therapeutic exercise; Endurance training;Patient/family training;Equipment eval/education; Bed mobility;Gait training; Compensatory technique education;Continued evaluation;Spoke to nursing;OT   PT Frequency Other (Comment)  (3-5x/wk)   Recommendation   Recommendation Short-term skilled PT   Equipment Recommended Walker   PT - OK to Discharge Yes  (to rhb, No to home when medically stable )   Modified [place] Scale   Modified [place] Scale 4   Barthel Index   Feeding 10   Bathing 0   Grooming Score 5   Dressing Score 5   Bladder Score 10   Bowels Score 10   Toilet Use Score 5   Transfers (Bed/Chair) Score 10   Mobility (Level Surface) Score 0   Stairs Score 0   Barthel Index Score 55     History: co - morbidities, fall risk, use of assistive device, assist for adl's,multiple lines, alone, ALBER  Exam: impairments in locomotion, musculoskeletal, balance,posture, joint integrity, barthel 55   Clinical: unstable/unpredictable ( fall risk, pain, more assist than baseline, ongoing medical management)  Complexity:high  Alphonse Ospina, PT

## 2019-10-01 NOTE — ASSESSMENT & PLAN NOTE
· The patient reports a 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, persisting for months  · CT showed opacification of the right maxilla cannot rule out fungal infection  She also has a periapical abscess on the right upper incisor  · Consult ENT regarding abnormal findings  · Outpatient omfs/dental eval for periapical abscess  · She is fairly asymptomatic    Will not start antibiotics especially in the setting of diarrhea

## 2019-10-01 NOTE — UTILIZATION REVIEW
Initial Clinical Review    Admission: Date/Time/Statement: Inpatient Admission Orders (From admission, onward)     Ordered        09/30/19 1934  Inpatient Admission  Once                   Orders Placed This Encounter   Procedures    Inpatient Admission     Standing Status:   Standing     Number of Occurrences:   1     Order Specific Question:   Admitting Physician     Answer:   Vlad Valero [[id number]]     Order Specific Question:   Level of Care     Answer:   Med Surg [[id number]]     Order Specific Question:   Estimated length of stay     Answer:   More than 2 Midnights     Order Specific Question:   Certification     Answer:   I certify that inpatient services are medically necessary for this patient for a duration of greater than two midnights  See H&P and MD Progress Notes for additional information about the patient's course of treatment  ED Arrival Information     Expected Arrival Acuity Means of Arrival Escorted By Service Admission Type    - 9/30/2019 17:50 Urgent Ambulance [facility] ([address]) Hospitalist Urgent    Arrival Complaint    Syncope        Chief Complaint   Patient presents with    Syncope     patient has had multiple episodes of diarrhea and vomiting at home  Patient had 2 syncopal episodes while at home  patient cold and diaphoretic upon EMS arrival  denies CP and SOB  Assessment/Plan: Syncope and collapse  Assessment & Plan  Maybe vasovagal vs dehydration from emesis/diarrhea  CT: Neg for acute intracranial abnormality  Mild chronic small vessel ischemic changes and volume loss    Orthostatic VS  Monitor on telemetry     Gastroenteritis, acute  Assessment & Plan  Reports nausea, emesis and diarrhea  Will obtain stool studies and C Diff   Soft diet; advance as tolerated  IV hyration  Prn antiemetics     Closed fracture of fourth metatarsal bone  Assessment & Plan  L foot XR: preliminary report shows 4th and 5th metatarsal fracture; f/u official report  XR hip/pelvis pending  NWB L foot  PT OT consult  Ice packs; analgesics  Podiatry consult     Essential hypertension  Assessment & Plan  Continue amlodipine     Leukocytosis  Assessment & Plan  WBC 16 7, may be reactive  CT: shows opacification of the right maxillary sinus with hyperdense material which can be seen with fungal infection  Will obtain CT sinus  Monitor for fever or s/s infection  Monitor CBC   Type 2 diabetes mellitus with complication, with long-term current use of insulin ([address]   Anticipated Length of Stay:  Patient will be admitted on an Inpatient basis with an anticipated length of stay of  > 2 midnights  Justification for Hospital Stay: syncope; metatarsal fx    Loco Estrada is a 80 y o  female who presents with c/o syncope and collapse  Patient states she thinks she ate something that disagreed with her, today she felt ill to her stomach, feels nauseous, had 2 episodes of emesis and diarrhea x4  She went to the bathroom, sat on the commode, felt faint and heard herself fall to the floor; she remembers being seated on the floor with her head against the wall, she sat for a while then managed to get herself up  Shortly after she felt abdominal pain, nauseous, hot, cold and diaphoretic, vomited, her son arrived at that moment  States she didn't have much of an appetite today, only ate tea and muffin  CT shows possible fungal sinusitis; ROS positive for sneezing and clear rhinorrhea; denies sinus or facial pain, fever, visual changes or changes in mentation  Denies recent abx use of hospitalization; denies fever, cough, SOB, hematuria or dysuria      Per  Podiatry Consult:    79yo F with nondisplaced left foot 4th and 5th metatarsal shaft fracture      Plan:  - left foot xray reviewed: nondisplaced 4th and 5th metatarsal shaft fractures  - applied compressive dressing to reduce swelling, recommend elevation and ice as needed  - WBAT in surgical shoe    Per  ENT  Consult:    Chronic right maxillary sinusitis - likely fungal in origin - not related to malar infection  2  Right periapical lucency - possibly associated with malar infection/non healing ulcer  3  Right cheek lesion/non healing ulcer - unsure if this is representative of primary skin malignancy or secondary to periapical issue  Plan:  Await dental ([facility]) input  I suspect that the upper periapical issue will require therapy  Once treated she may follow up in my office to discuss the right maxillary sinus issue and skin lesion  I would likely recommend right sided endoscopic sinus surgery to open the sinus and obtain a culture  She must be cleared medically prior to this procedure  At this time no further antibiotic therapy needed for this issue  The skin lesion has been present for the past year with recurrence of crust formation  I have told her that this could represent a primary skin cancer or it may be associated with a low grade infection from her dental issue  If the dental issue is treated (or needs to be treated) and the lesion does not heal she will require biopsy - she will likely require a shave biopsy in either case since this will not heal short term  I could complete this in the office setting when she is seen  I discussed this with the patient and her son who is the power of   We will contact her son next week to arrange follow up       ED Triage Vitals [09/30/19 1752]   Temperature Pulse Respirations Blood Pressure SpO2   97 8 °F (36 6 °C) 60 20 149/67 97 %      Temp Source Heart Rate Source Patient Position - Orthostatic VS BP Location FiO2 (%)   Oral Monitor Lying Right arm --      Pain Score       2        Wt Readings from Last 1 Encounters:   09/30/19 50 6 kg (111 lb 8 8 oz)     Additional Vital Signs:   /01/19 0732  98 6 °F (37 °C)  75  16  123/71  96 %  None (Room air)  Lying   09/30/19 2150  97 8 °F (36 6 °C)  76  20  148/70  98 %  None (Room air)  Lying   09/30/19 2054    84    141/65      Standing - Orthostatic VS   09/30/19 2050   78    135/63      Sitting - Orthostatic VS   09/30/19 2047    76    134/56      Lying - Orthostatic VS   09/30/19 2039    76  20  143/64  97 %  None (Room air)  Lying   09/30/19 1824            None (Room air)     09/30/19 1752  97 8 °F (36 6 °C)  60  20  149/67  97 %  None (Room air)  Lying         Pertinent Labs/Diagnostic Test Results:   Results from last 7 days   Lab Units 10/01/19  0538 09/30/19  1818   WBC Thousand/uL 10 06 16 76*   HEMOGLOBIN g/dL 11 7 14 3   HEMATOCRIT % 35 0 43 5   PLATELETS Thousands/uL 207 275   NEUTROS ABS Thousands/µL 8 70* 13 72*         Results from last 7 days   Lab Units 10/01/19  0538 09/30/19  1818   SODIUM mmol/L 140 140   POTASSIUM mmol/L 3 8 3 6   CHLORIDE mmol/L 107 104   CO2 mmol/L 26 25   ANION GAP mmol/L 7 11   BUN mg/dL 13 15   CREATININE mg/dL 0 70 0 75   EGFR ml/min/1 73sq m 77 70   CALCIUM mg/dL 8 5 9 5     Results from last 7 days   Lab Units 09/30/19  1818   AST U/L 18   ALT U/L 16   ALK PHOS U/L 84   TOTAL PROTEIN g/dL 7 5   ALBUMIN g/dL 3 9   TOTAL BILIRUBIN mg/dL 0 56         Results from last 7 days   Lab Units 10/01/19  0538 09/30/19  1818   GLUCOSE RANDOM mg/dL 131 156*         Results from last 7 days   Lab Units 09/30/19  1818   TROPONIN I ng/mL <0 02           Results from last 7 days   Lab Units 09/30/19  1818   LIPASE u/L 77           Results from last 7 days   Lab Units 10/01/19  0027   C DIFF TOXIN B  NEGATIVE for C difficle toxin by PCR  Results from last 7 days   Lab Units 10/01/19  0027   SALMONELLA SP PCR  None Detected   SHIGELLA SP/ENTEROINVASIVE E  COLI (EIEC)  None Detected   CAMPYLOBACTER SP (JEJUNI AND COLI)  None Detected   SHIGA TOXIN 1/SHIGA TOXIN 2  None Detected       Ct  Head   ( 9/30)        No acute intracranial abnormality        Mild chronic small vessel ischemic changes and volume loss        Opacification of the right maxillary sinus with hyperdense material which can be seen with fungal infection    The remainder the visualized paranasal sinuses are well-aerated       X  Ray  L foot  (  9/30)   4th and 5th  MT  fracture        EKG   ( 9/30)  SB           ED Treatment:   Medication Administration from 09/30/2019 1750 to 09/30/2019 2232       Date/Time Order Dose Route Action Action by Comments     09/30/2019 1815 ondansetron (FOR EMS ONLY) (ZOFRAN) 4 mg/2 mL injection 4 mg 0 mg Does not apply Given to EMS Mary Ellen Han RN      09/30/2019 1938 sodium chloride 0 9 % bolus 500 mL 0 mL Intravenous Stopped Priscila Kruger      09/30/2019 1822 sodium chloride 0 9 % bolus 500 mL 500 mL Intravenous New Bag Mary Ellen Han RN      09/30/2019 2125 oxyCODONE (ROXICODONE) IR tablet 2 5 mg 2 5 mg Oral Given Priscila Kruger         Past Medical History:   Diagnosis Date    Abnormal gait     Chronic renal failure     Diabetes mellitus ([address] )     GERD (gastroesophageal reflux disease)     H  pylori infection     Hypertension     Hypertensive urgency     Pancreatic lesion     Premature atrial beats     Renal disorder     TIA (transient ischemic attack)      Present on Admission:   Closed fracture of fourth metatarsal bone   Leukocytosis   Syncope and collapse   Gastroesophageal reflux   Essential hypertension   Gastroenteritis, acute      Admitting Diagnosis: Diarrhea [R19 7]  Syncope [R55]  Vomiting [R11 10]  Closed nondisplaced fracture of fourth metatarsal bone of left foot, initial encounter [S92 345A]  Closed nondisplaced fracture of fifth metatarsal bone of left foot, initial encounter [facility]  Age/Sex: 80 y o  female  Admission Orders:    Current Facility-Administered Medications:  acetaminophen 650 mg Oral Q6H PRN ANGI Huang    amLODIPine 5 mg Oral After Dinner ANGI Huang    aspirin 81 mg Oral Daily Amalia Kern, ANGI    atorvastatin 40 mg Oral QPM ANGI Huang    clopidogrel 75 mg Oral Daily Amalia Kern, ANGI    enoxaparin 40 mg Subcutaneous Daily Murtis El, CRNP    ondansetron 4 mg Intravenous Q6H PRN Murtis El, CRNP    oxyCODONE 2 5 mg Oral Q4H PRN Murtis El, CRNP    pantoprazole 20 mg Oral Early Morning Murtis El, CRNP    sodium chloride 75 mL/hr Intravenous Continuous Murtis El, CRNP Last Rate: 75 mL/hr (09/30/19 5358)       IP CONSULT TO CASE MANAGEMENT  IP CONSULT TO PODIATRY  IP CONSULT TO ENT     Orthostatic  BP  X1    Network Utilization Review Department  Phone: [phone]; Fax [phone]  [email or web address]  ATTENTION: Please call with any questions or concerns to [phone]  and carefully listen to the prompts so that you are directed to the right person  Send all requests for admission clinical reviews, approved or denied determinations and any other requests to fax [phone]   All voicemails are confidential

## 2019-10-01 NOTE — ASSESSMENT & PLAN NOTE
Probably viral   C Diff PCR sent and pending  Last admission was 12/18 and no reported recent antibiotic use so I have a low suspicion for this    Place on regular diet

## 2019-10-01 NOTE — PROGRESS NOTES
Progress Note - Breann Hanson 8/7/1929, 80 y o  female MRN: [id number]    Unit/Bed#: E2 -01 Encounter: [id number]    Primary Care Provider: Jean-Paul Brito MD   Date and time admitted to hospital: 9/30/2019  5:50 PM        * Syncope and collapse  Assessment & Plan  Due to vasovagal syncope related to acute gastroenteritis with nausea/vomiting/diarrhea  CT head negative for stroke  Telemetry with sinus bradycardia but no malignant arrhythmia  Resolved  Activity as tolerated with PT  DC telemetry      Gastroenteritis, acute  Assessment & Plan  Probably viral   C Diff PCR sent and pending  Last admission was 12/18 and no reported recent antibiotic use so I have a low suspicion for this  Place on regular diet    Closed fracture of fourth metatarsal bone  Assessment & Plan  Left 4th and 5th metatarsal fracture likely traumatic  Podiatry evaluation done, will not need surgery  PT OT consult  Ice packs; analgesics    Sinus bradycardia  Assessment & Plan  · Secondary to advanced age and aged conduction system  · Avoid AV jose alfredo blocking agent  · DC telemetry    Abnormal CT scan, sinus  Assessment & Plan  · The patient reports a 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, persisting for months  · CT showed opacification of the right maxilla cannot rule out fungal infection  She also has a periapical abscess on the right upper incisor  · Consult ENT regarding abnormal findings  · Outpatient omfs/dental eval for periapical abscess  · She is fairly asymptomatic  Will not start antibiotics especially in the setting of diarrhea    Essential hypertension  Assessment & Plan  Continue amlodipine 5 mg daily with hold    Leukocytosis  Assessment & Plan  Secondary to acute gastroenteritis    WBC count improving from 16 down to 10      Gastroesophageal reflux  Assessment & Plan  Stable on Protonix    VTE Pharmacologic Prophylaxis:   Pharmacologic: Heparin  Mechanical VTE Prophylaxis in Place: Yes    Discussions with Specialists or Other Care Team Provider:  Podiatry    Education and Discussions with Family / Patient:  Son    Time Spent for Care: 30 minutes  More than 50% of total time spent on counseling and coordination of care as described above  Current Length of Stay: 1 day(s)    Current Patient Status: Inpatient   Certification Statement: The patient will continue to require additional inpatient hospital stay due to Diarrhea, foot fracture    Discharge Plan: To be determined  Possibly in 24-48 hours  PT OT pending  Code Status: Level 3 - DNAR and DNI      Subjective:   Patient reports 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 which has been there for months  She has tried different topical solutions without improved  She denies pain in the right cheek  She denies pain in the right upper tooth  She recalled eating a frozen dinner (macaroni and cheese with broccoli) for dinner the night before her admission  In the morning after her dinner, she developed severe nausea, vomiting, diarrhea and syncope  Objective:     Vitals:   Temp (24hrs), Av 1 °F (36 7 °C), Min:97 8 °F (36 6 °C), Max:98 6 °F (37 °C)    Temp:  [97 8 °F (36 6 °C)-98 6 °F (37 °C)] 98 6 °F (37 °C)  HR:  [60-84] 75  Resp:  [16-20] 16  BP: (123-149)/(56-71) 123/71  SpO2:  [96 %-98 %] 96 %  Body mass index is 20 08 kg/m²  Input and Output Summary (last 24 hours): Intake/Output Summary (Last 24 hours) at 10/1/2019 0935  Last data filed at 2019 2254  Gross per 24 hour   Intake 500 ml   Output 200 ml   Net 300 ml       Physical Exam:     Physical Exam   Constitutional: She appears well-developed  No distress  HENT:   Head: Normocephalic and atraumatic  Neck: No JVD present  Cardiovascular:   Regular bradycardia   Pulmonary/Chest: Breath sounds normal  No stridor  No respiratory distress  She has no wheezes  Abdominal: Soft  She exhibits no distension  There is no tenderness  There is no guarding     Musculoskeletal: Diminished muscle mass   Skin: Skin is warm and dry  She is not diaphoretic  Psychiatric: She has a normal mood and affect  Her behavior is normal      Additional Data:     Labs:    Results from last 7 days   Lab Units 10/01/19  0538   WBC Thousand/uL 10 06   HEMOGLOBIN g/dL 11 7   HEMATOCRIT % 35 0   PLATELETS Thousands/uL 207   NEUTROS PCT % 86*   LYMPHS PCT % 8*   MONOS PCT % 6   EOS PCT % 0     Results from last 7 days   Lab Units 10/01/19  0538 09/30/19  1818   SODIUM mmol/L 140 140   POTASSIUM mmol/L 3 8 3 6   CHLORIDE mmol/L 107 104   CO2 mmol/L 26 25   BUN mg/dL 13 15   CREATININE mg/dL 0 70 0 75   ANION GAP mmol/L 7 11   CALCIUM mg/dL 8 5 9 5   ALBUMIN g/dL  --  3 9   TOTAL BILIRUBIN mg/dL  --  0 56   ALK PHOS U/L  --  84   ALT U/L  --  16   AST U/L  --  18   GLUCOSE RANDOM mg/dL 131 156*                           * I Have Reviewed All Lab Data Listed Above  * Additional Pertinent Lab Tests Reviewed:  All Labs Within Last 24 Hours Reviewed    Imaging:    Imaging Reports Reviewed Today Include:  CT sinus  Imaging Personally Reviewed by Myself Includes:  CT sinus    Recent Cultures (last 7 days):           Last 24 Hours Medication List:     Current Facility-Administered Medications:  acetaminophen 650 mg Oral Q6H PRN Geroge Perfect, CRNP    amLODIPine 5 mg Oral After 202-206 [facility], CRNP    aspirin 81 mg Oral Daily Geroge Perfect, CRNP    atorvastatin 40 mg Oral QPM Geroge Perfect, CRNP    clopidogrel 75 mg Oral Daily Geroge Perfect, CRNP    enoxaparin 40 mg Subcutaneous Daily Geroge Perfect, CRNP    ondansetron 4 mg Intravenous Q6H PRN Geroge Perfect, CRNP    oxyCODONE 2 5 mg Oral Q4H PRN Geroge Perfect, CRNP    pantoprazole 20 mg Oral Early Morning Geroge Perfect, CRNP    sodium chloride 75 mL/hr Intravenous Continuous Geroge Perfect, CRNP Last Rate: 75 mL/hr (09/30/19 7552)        Today, Patient Was Seen By: Anai Leonardo MD    ** Please Note: Dictation voice to text software may have been used in the creation of this document   **

## 2019-10-01 NOTE — ASSESSMENT & PLAN NOTE
· Secondary to advanced age and aged conduction system  · Avoid AV jose alfredo blocking agent  · DC telemetry

## 2019-10-01 NOTE — OCCUPATIONAL THERAPY NOTE
OccupationalTherapy Evaluation(time=6428-3107)     Patient Name: Mela Barrera Date: 10/1/2019  Problem List  Principal Problem:    Syncope and collapse  Active Problems:    Gastroesophageal reflux    Closed fracture of fourth metatarsal bone    Leukocytosis    Essential hypertension    Gastroenteritis, acute    Abnormal CT scan, sinus    Sinus bradycardia    Past Medical History  Past Medical History:   Diagnosis Date    Abnormal gait     Chronic renal failure     Diabetes mellitus (HCC)     GERD (gastroesophageal reflux disease)     H  pylori infection     Hypertension     Hypertensive urgency     Pancreatic lesion     Premature atrial beats     Renal disorder     TIA (transient ischemic attack)      Past Surgical History  Past Surgical History:   Procedure Laterality Date    CHOLECYSTECTOMY               10/01/19 1105   Note Type   Note type Eval only   Restrictions/Precautions   Weight Bearing Precautions Per Order Yes   LLE Weight Bearing Per Order WBAT  (with sx shoe)   Other Precautions Fall Risk;Pain;Hard of hearing; Chair Alarm   Pain Assessment   Pain Assessment FLACC   Pain Rating: FLACC (Rest) - Face 0   Pain Rating: FLACC (Rest) - Legs 0   Pain Rating: FLACC (Rest) - Activity 0   Pain Rating: FLACC (Rest) - Cry 1   Pain Rating: FLACC (Rest) - Consolability 0   Score: FLACC (Rest) 1   Home Living   Type of Home House   Home Layout Multi-level; Able to live on main level with bedroom/bathroom  (4 leonardo with railing)   Bathroom Shower/Tub Walk-in shower   [address] in the last 6 months 1 to 4  (1)   Lifestyle   Autonomy PTA pt states independence with all aspects of her ADLs, transfers, ambulation--with RW; neg , +falls=1   Reciprocal Relationships 4 children   Service to Others homemaker   Intrinsic Gratification watching the news   Psychosocial Psychosocial (WDL) WDL   Subjective   Subjective "It's a little sore when I'm walking on it "   ADL   Where Assessed Edge of bed   Eating Assistance 6  Modified independent   Grooming Assistance 6  Modified Independent   UB Bathing Assistance 4  Minimal Assistance   LB Bathing Assistance 3  Moderate Assistance   UB Dressing Assistance 4  Minimal Assistance   LB Dressing Assistance 3  Moderate Assistance   Bed Mobility   Rolling R 4  Minimal assistance   Additional items Assist x 1; Increased time required;Verbal cues;LE management   Rolling L 4  Minimal assistance   Additional items Assist x 1; Increased time required;Verbal cues;LE management   Supine to Sit 4  Minimal assistance   Additional items Assist x 1; Increased time required;Verbal cues;LE management   Transfers   Sit to Stand 4  Minimal assistance   Additional items Assist x 1; Increased time required;Verbal cues   Stand to Sit 4  Minimal assistance   Additional items Assist x 1; Increased time required;Verbal cues   Additional Comments bp's=152/72(EOB), 94% on RA   Functional Mobility   Functional Mobility 4  Minimal assistance   Additional Comments x1   Additional items Rolling walker   Balance   Static Sitting Fair +   Dynamic Sitting Fair   Static Standing Poor +   Dynamic Standing Poor   Activity Tolerance   Activity Tolerance Patient limited by fatigue;Patient limited by pain   Medical Staff Made Aware nsg, P T     RUE Assessment   RUE Assessment WFL   RUE Strength   RUE Overall Strength   (limited shr AROM(i e flex=20-30*);elbow-distal=WFLs)   LUE Assessment   LUE Assessment WFL   LUE Strength   LUE Overall Strength Within Functional Limits - able to perform ADL tasks with strength  (4/5 throughout)   Hand Function   Gross Motor Coordination Functional   Fine Motor Coordination Functional   Sensation   Light Touch No apparent deficits   Proprioception   Proprioception No apparent deficits   Vision-Basic Assessment   Current Vision Wears glasses all the time   Vision - Complex Assessment   Acuity   (impaired)   Perception   Inattention/Neglect Appears intact   Cognition   Overall Cognitive Status WFL   Arousal/Participation Alert   Attention Within functional limits   Orientation Level Oriented X4   Memory Within functional limits   Following Commands Follows all commands and directions without difficulty   Assessment   Limitation Decreased ADL status; Decreased UE ROM; Decreased UE strength;Decreased endurance;Decreased high-level ADLs   Prognosis Good   Assessment Pt is a 79y/o female admitted to the hospital after falling at home while walking to the bathroom  Pt noted with L 4th/5th metatarsal fx; podiatry allowing WBAT with sx shoe  PTA pt states independence with all aspects of her ADLs, transfers, ambulation--with RW; neg , +falls=1  During initial eval, pt demonstrated deficits with her functional balance, functional mobility, ADL status, activity tolerance(currently fair=15-20mins), transfer safety, b/l UE strength, and questionable cognition(i e problem-solving)  Pt would benefit from continued OT tx for the above deficits  3-5xwk/1-2wks  Goals   Patient Goals "to get better "   STG Time Frame 3-5   Short Term Goal #1 Pt will demonstrate improved activity tolerance to good(20-30mins) and standing tolerance to 3-5mins to assist with ADLs  Short Term Goal #2 Pt will tolerate continued cognitive/home-safety assessment and appropriate d/c recommendations will be provided  Short Term Goal  Pt will demonstrate mod I with their sit-stand transfers to assist with completion of their LE dressing  LTG Time Frame   (5-10days)   Long Term Goal #1 Pt will demonstrate proper walker/transfer safety 100% of the time  Long Term Goal #2 Pt will demonstrate g/g- balance with all functional activities  Long Term Goal Pt will demonstrate mod I with their UE and LE bathing/dresssing      Plan   Treatment Interventions ADL retraining;Functional transfer training;UE strengthening/ROM; Endurance training;Cognitive reorientation;Patient/family training;Equipment evaluation/education; Compensatory technique education;Continued evaluation   Goal Expiration Date 10/16/19   Recommendation   OT Discharge Recommendation Short Term Rehab   Barthel Index   Feeding 10   Bathing 0   Grooming Score 5   Dressing Score 5   Bladder Score 10   Bowels Score 10   Toilet Use Score 5   Transfers (Bed/Chair) Score 10   Mobility (Level Surface) Score 0   Stairs Score 0   Barthel Index Score 55   Kendal Wills, OT

## 2019-10-01 NOTE — PLAN OF CARE
Problem: Prexisting or High Potential for Compromised Skin Integrity  Goal: Skin integrity is maintained or improved  Description  INTERVENTIONS:  - Identify patients at risk for skin breakdown  - Assess and monitor skin integrity  - Assess and monitor nutrition and hydration status  - Monitor labs   - Assess for incontinence   - Turn and reposition patient  - Assist with mobility/ambulation  - Relieve pressure over bony prominences  - Avoid friction and shearing  - Provide appropriate hygiene as needed including keeping skin clean and dry  - Evaluate need for skin moisturizer/barrier cream  - Collaborate with interdisciplinary team   - Patient/family teaching  - Consider wound care consult   Outcome: Progressing     Problem: PAIN - ADULT  Goal: Verbalizes/displays adequate comfort level or baseline comfort level  Description  Interventions:  - Encourage patient to monitor pain and request assistance  - Assess pain using appropriate pain scale  - Administer analgesics based on type and severity of pain and evaluate response  - Implement non-pharmacological measures as appropriate and evaluate response  - Consider cultural and social influences on pain and pain management  - Notify physician/advanced practitioner if interventions unsuccessful or patient reports new pain  Outcome: Progressing     Problem: INFECTION - ADULT  Goal: Absence or prevention of progression during hospitalization  Description  INTERVENTIONS:  - Assess and monitor for signs and symptoms of infection  - Monitor lab/diagnostic results  - Monitor all insertion sites, i e  indwelling lines, tubes, and drains  - Monitor endotracheal if appropriate and nasal secretions for changes in amount and color  - [place] appropriate cooling/warming therapies per order  - Administer medications as ordered  - Instruct and encourage patient and family to use good hand hygiene technique  - Identify and instruct in appropriate isolation precautions for identified infection/condition  Outcome: Progressing  Goal: Absence of fever/infection during neutropenic period  Description  INTERVENTIONS:  - Monitor WBC    Outcome: Progressing     Problem: SAFETY ADULT  Goal: Patient will remain free of falls  Description  INTERVENTIONS:  - Assess patient frequently for physical needs  -  Identify cognitive and physical deficits and behaviors that affect risk of falls    -  [place] fall precautions as indicated by assessment   - Educate patient/family on patient safety including physical limitations  - Instruct patient to call for assistance with activity based on assessment  - Modify environment to reduce risk of injury  - Consider OT/PT consult to assist with strengthening/mobility  Outcome: Progressing  Goal: Maintain or return to baseline ADL function  Description  INTERVENTIONS:  -  Assess patient's ability to carry out ADLs; assess patient's baseline for ADL function and identify physical deficits which impact ability to perform ADLs (bathing, care of mouth/teeth, toileting, grooming, dressing, etc )  - Assess/evaluate cause of self-care deficits   - Assess range of motion  - Assess patient's mobility; develop plan if impaired  - Assess patient's need for assistive devices and provide as appropriate  - Encourage maximum independence but intervene and supervise when necessary  - Involve family in performance of ADLs  - Assess for home care needs following discharge   - Consider OT consult to assist with ADL evaluation and planning for discharge  - Provide patient education as appropriate  Outcome: Progressing  Goal: Maintain or return mobility status to optimal level  Description  INTERVENTIONS:  - Assess patient's baseline mobility status (ambulation, transfers, stairs, etc )    - Identify cognitive and physical deficits and behaviors that affect mobility  - Identify mobility aids required to assist with transfers and/or ambulation (gait belt, sit-to-stand, lift, walker, cane, etc )  - [place] fall precautions as indicated by assessment  - Record patient progress and toleration of activity level on Mobility SBAR; progress patient to next Phase/Stage  - Instruct patient to call for assistance with activity based on assessment  - Consider rehabilitation consult to assist with strengthening/weightbearing, etc   Outcome: Progressing     Problem: DISCHARGE PLANNING  Goal: Discharge to home or other facility with appropriate resources  Description  INTERVENTIONS:  - Identify barriers to discharge w/patient and caregiver  - Arrange for needed discharge resources and transportation as appropriate  - Identify discharge learning needs (meds, wound care, etc )  - Arrange for interpretive services to assist at discharge as needed  - Refer to Case Management Department for coordinating discharge planning if the patient needs post-hospital services based on physician/advanced practitioner order or complex needs related to functional status, cognitive ability, or social support system  Outcome: Progressing     Problem: Knowledge Deficit  Goal: Patient/family/caregiver demonstrates understanding of disease process, treatment plan, medications, and discharge instructions  Description  Complete learning assessment and assess knowledge base    Interventions:  - Provide teaching at level of understanding  - Provide teaching via preferred learning methods  Outcome: Progressing

## 2019-10-02 PROBLEM — D72.829 LEUKOCYTOSIS: Status: RESOLVED | Noted: 2019-09-30 | Resolved: 2019-10-02

## 2019-10-02 LAB
ANION GAP SERPL CALCULATED.3IONS-SCNC: 7 MMOL/L (ref 4–13)
ATRIAL RATE: 326 BPM
BASOPHILS # BLD AUTO: 0.01 THOUSANDS/ΜL (ref 0–0.1)
BASOPHILS NFR BLD AUTO: 0 % (ref 0–1)
BUN SERPL-MCNC: 10 MG/DL (ref 5–25)
CALCIUM SERPL-MCNC: 8.6 MG/DL (ref 8.3–10.1)
CHLORIDE SERPL-SCNC: 111 MMOL/L (ref 100–108)
CO2 SERPL-SCNC: 26 MMOL/L (ref 21–32)
CREAT SERPL-MCNC: 0.73 MG/DL (ref 0.6–1.3)
EOSINOPHIL # BLD AUTO: 0.06 THOUSAND/ΜL (ref 0–0.61)
EOSINOPHIL NFR BLD AUTO: 1 % (ref 0–6)
ERYTHROCYTE [DISTWIDTH] IN BLOOD BY AUTOMATED COUNT: 13.4 % (ref 11.6–15.1)
GFR SERPL CREATININE-BSD FRML MDRD: 73 ML/MIN/1.73SQ M
GLUCOSE SERPL-MCNC: 110 MG/DL (ref 65–140)
HCT VFR BLD AUTO: 35.5 % (ref 34.8–46.1)
HGB BLD-MCNC: 11.9 G/DL (ref 11.5–15.4)
IMM GRANULOCYTES # BLD AUTO: 0.03 THOUSAND/UL (ref 0–0.2)
IMM GRANULOCYTES NFR BLD AUTO: 0 % (ref 0–2)
LYMPHOCYTES # BLD AUTO: 1.33 THOUSANDS/ΜL (ref 0.6–4.47)
LYMPHOCYTES NFR BLD AUTO: 20 % (ref 14–44)
MCH RBC QN AUTO: 33.7 PG (ref 26.8–34.3)
MCHC RBC AUTO-ENTMCNC: 33.5 G/DL (ref 31.4–37.4)
MCV RBC AUTO: 101 FL (ref 82–98)
MONOCYTES # BLD AUTO: 0.65 THOUSAND/ΜL (ref 0.17–1.22)
MONOCYTES NFR BLD AUTO: 10 % (ref 4–12)
NEUTROPHILS # BLD AUTO: 4.73 THOUSANDS/ΜL (ref 1.85–7.62)
NEUTS SEG NFR BLD AUTO: 69 % (ref 43–75)
NRBC BLD AUTO-RTO: 0 /100 WBCS
PLATELET # BLD AUTO: 186 THOUSANDS/UL (ref 149–390)
PMV BLD AUTO: 8.8 FL (ref 8.9–12.7)
POTASSIUM SERPL-SCNC: 3.6 MMOL/L (ref 3.5–5.3)
QRS AXIS: 26 DEGREES
QRSD INTERVAL: 84 MS
QT INTERVAL: 424 MS
QTC INTERVAL: 430 MS
RBC # BLD AUTO: 3.53 MILLION/UL (ref 3.81–5.12)
SODIUM SERPL-SCNC: 144 MMOL/L (ref 136–145)
T WAVE AXIS: 38 DEGREES
VENTRICULAR RATE: 62 BPM
WBC # BLD AUTO: 6.81 THOUSAND/UL (ref 4.31–10.16)

## 2019-10-02 PROCEDURE — 93010 ELECTROCARDIOGRAM REPORT: CPT | Performed by: INTERNAL MEDICINE

## 2019-10-02 PROCEDURE — 97116 GAIT TRAINING THERAPY: CPT

## 2019-10-02 PROCEDURE — G0515 COGNITIVE SKILLS DEVELOPMENT: HCPCS

## 2019-10-02 PROCEDURE — 80048 BASIC METABOLIC PNL TOTAL CA: CPT | Performed by: INTERNAL MEDICINE

## 2019-10-02 PROCEDURE — 99232 SBSQ HOSP IP/OBS MODERATE 35: CPT | Performed by: INTERNAL MEDICINE

## 2019-10-02 PROCEDURE — 97530 THERAPEUTIC ACTIVITIES: CPT

## 2019-10-02 PROCEDURE — 85025 COMPLETE CBC W/AUTO DIFF WBC: CPT | Performed by: INTERNAL MEDICINE

## 2019-10-02 PROCEDURE — 97535 SELF CARE MNGMENT TRAINING: CPT

## 2019-10-02 PROCEDURE — 97110 THERAPEUTIC EXERCISES: CPT

## 2019-10-02 RX ADMIN — SODIUM CHLORIDE 75 ML/HR: 0.9 INJECTION, SOLUTION INTRAVENOUS at 17:20

## 2019-10-02 RX ADMIN — SODIUM CHLORIDE 75 ML/HR: 0.9 INJECTION, SOLUTION INTRAVENOUS at 05:59

## 2019-10-02 RX ADMIN — ASPIRIN 81 MG 81 MG: 81 TABLET ORAL at 09:20

## 2019-10-02 RX ADMIN — AMLODIPINE BESYLATE 5 MG: 5 TABLET ORAL at 17:09

## 2019-10-02 RX ADMIN — CLOPIDOGREL BISULFATE 75 MG: 75 TABLET ORAL at 09:20

## 2019-10-02 RX ADMIN — ATORVASTATIN CALCIUM 40 MG: 40 TABLET, FILM COATED ORAL at 17:09

## 2019-10-02 RX ADMIN — ENOXAPARIN SODIUM 40 MG: 40 INJECTION SUBCUTANEOUS at 09:20

## 2019-10-02 RX ADMIN — PANTOPRAZOLE SODIUM 20 MG: 20 TABLET, DELAYED RELEASE ORAL at 05:59

## 2019-10-02 NOTE — PROGRESS NOTES
Progress Note - Marcela Remdond 8/7/1929, 80 y o  female MRN: [id number]    Unit/Bed#: E2 -01 Encounter: [id number]    Primary Care Provider: Kenan Alvarez MD   Date and time admitted to hospital: 9/30/2019  5:50 PM        * Syncope and collapse  Assessment & Plan  Due to vasovagal syncope related to acute gastroenteritis with nausea/vomiting/diarrhea  CT head negative for stroke  Telemetry with sinus bradycardia but no malignant arrhythmia  Resolved  Activity as tolerated with PT      Gastroenteritis, acute  Assessment & Plan  Likely viral  Resolved  C  Diff PCR negative  Continue regular diet    Closed fracture of fourth metatarsal bone  Assessment & Plan  Left 4th and 5th metatarsal fracture, traumatic  Podiatry evaluation done  Conservative treatment only and  will not need surgery  STR placement and OP podiatry follow up    Sinus bradycardia  Assessment & Plan  · Secondary to advanced age and aged conduction system  · Avoid AV jose alfredo blocking agent    Abnormal CT scan, sinus  Assessment & Plan  · WIth maxilary sinus opacification and small right upper incisor periapical abscess  · Fairly asymptomatic  · OP ENT and OMFS follow ups  · Called OMFS office today and left a voicemail in their physician line    Essential hypertension  Assessment & Plan  Continue amlodipine 5 mg daily with holds    Gastroesophageal reflux  Assessment & Plan  Stable on Protonix    Leukocytosisresolved as of 10/2/2019  Assessment & Plan  Secondary to acute gastroenteritis  WBC count improving from 16 down to 6        VTE Pharmacologic Prophylaxis:   Pharmacologic: Enoxaparin (Lovenox)  Mechanical VTE Prophylaxis in Place: No    Patient Centered Rounds: I have performed bedside rounds with nursing staff today  Time Spent for Care: 20 minutes  More than 50% of total time spent on counseling and coordination of care as described above      Current Length of Stay: 2 day(s)    Current Patient Status: Inpatient   Certification Statement: The patient will continue to require additional inpatient hospital stay due to STR    Discharge Plan: DC to STR once 3 day stay is completed    Code Status: Level 3 - DNAR and DNI      Subjective:   Denies facial or tooth pain  Denies facial swelling  Resolved diarhhea    Objective:     Vitals:   Temp (24hrs), Av 9 °F (36 6 °C), Min:97 7 °F (36 5 °C), Max:98 °F (36 7 °C)    Temp:  [97 7 °F (36 5 °C)-98 °F (36 7 °C)] 97 7 °F (36 5 °C)  HR:  [51-74] 61  Resp:  [16] 16  BP: (119-177)/(58-83) 157/74  SpO2:  [94 %-99 %] 99 %  Body mass index is 20 08 kg/m²  Input and Output Summary (last 24 hours): Intake/Output Summary (Last 24 hours) at 10/2/2019 1737  Last data filed at 10/2/2019 1723  Gross per 24 hour   Intake 2301 25 ml   Output 2300 ml   Net 1 25 ml       Physical Exam:     Physical Exam   Constitutional: She appears well-developed  No distress  HENT:   Head:       Poor dentition   Neck: No JVD present  Cardiovascular:   No murmur heard  Regular bradycardia   Pulmonary/Chest: Effort normal  No stridor  No respiratory distress  She has no wheezes  Abdominal: Soft  She exhibits no distension  There is no tenderness  Musculoskeletal: She exhibits no edema or deformity  Neurological: She is alert  Skin: Skin is warm and dry  She is not diaphoretic  Psychiatric: She has a normal mood and affect         Additional Data:     Labs:    Results from last 7 days   Lab Units 10/02/19  0543   WBC Thousand/uL 6 81   HEMOGLOBIN g/dL 11 9   HEMATOCRIT % 35 5   PLATELETS Thousands/uL 186   NEUTROS PCT % 69   LYMPHS PCT % 20   MONOS PCT % 10   EOS PCT % 1     Results from last 7 days   Lab Units 10/02/19  0543  19  1818   SODIUM mmol/L 144   < > 140   POTASSIUM mmol/L 3 6   < > 3 6   CHLORIDE mmol/L 111*   < > 104   CO2 mmol/L 26   < > 25   BUN mg/dL 10   < > 15   CREATININE mg/dL 0 73   < > 0 75   ANION GAP mmol/L 7   < > 11   CALCIUM mg/dL 8 6   < > 9 5   ALBUMIN g/dL  --   --  3 9 TOTAL BILIRUBIN mg/dL  --   --  0 56   ALK PHOS U/L  --   --  84   ALT U/L  --   --  16   AST U/L  --   --  18   GLUCOSE RANDOM mg/dL 110   < > 156*    < > = values in this interval not displayed  * I Have Reviewed All Lab Data Listed Above  * Additional Pertinent Lab Tests Reviewed: All Labs Within Last 24 Hours Reviewed    Imaging:    Imaging Reports Reviewed Today Include: No new imaging    Recent Cultures (last 7 days):     Results from last 7 days   Lab Units 10/01/19  0027   C DIFF TOXIN B  NEGATIVE for C difficle toxin by PCR  Last 24 Hours Medication List:     Current Facility-Administered Medications:  acetaminophen 650 mg Oral Q6H PRN Carleen Denae, CRNP    amLODIPine 5 mg Oral After 202-206 [facility], CRNP    aspirin 81 mg Oral Daily Carleen Denae, CRNP    atorvastatin 40 mg Oral QPM Carleen Denae, CRNP    clopidogrel 75 mg Oral Daily Carleen Denae, CRNP    enoxaparin 40 mg Subcutaneous Daily Carleen Denae, CRNP    ondansetron 4 mg Intravenous Q6H PRN Carleen Denae, CRNP    oxyCODONE 2 5 mg Oral Q4H PRN Carleen Denae, CRNP    pantoprazole 20 mg Oral Early Morning Carleen Denae, CRNP    sodium chloride 75 mL/hr Intravenous Continuous Carleen Denae, CRNP Last Rate: 75 mL/hr (10/02/19 1720)        Today, Patient Was Seen By: Zach Suh MD    ** Please Note: Dictation voice to text software may have been used in the creation of this document   **

## 2019-10-02 NOTE — OCCUPATIONAL THERAPY NOTE
OccupationalTherapy Progress Note(time=1436-1530)     Patient Name: Brittney Dumont Date: 10/2/2019  Problem List  Principal Problem:    Syncope and collapse  Active Problems:    Gastroesophageal reflux    Closed fracture of fourth metatarsal bone    Leukocytosis    Essential hypertension    Gastroenteritis, acute    Abnormal CT scan, sinus    Sinus bradycardia           Subjective:     10/02/19 1530   Restrictions/Precautions   Weight Bearing Precautions Per Order Yes   LLE Weight Bearing Per Order WBAT  (with sx shoe)   Other Precautions Chair Alarm; Bed Alarm;Cognitive; Fall Risk;Pain   Pain Assessment   Pain Assessment 0-10   Pain Score 5   Pain Type Acute pain   Pain Location Foot   Pain Orientation Left   ADL   Where Assessed Chair   LB Dressing Assistance 4  Minimal Assistance   LB Dressing Deficit Thread RLE into pants; Thread LLE into pants; Increased time to complete   LB Dressing Comments dependent with sx shoe   Functional Standing Tolerance   Time 1-2mins   Activity LE ADLs   Bed Mobility   Sit to Supine 4  Minimal assistance   Additional items Assist x 1; Increased time required;Verbal cues;LE management   Transfers   Sit to Stand 5  Supervision   Additional items Increased time required;Verbal cues   Stand to Sit 5  Supervision   Additional items Increased time required;Verbal cues   Functional Mobility   Functional Mobility 5  Supervision   Additional items Rolling walker   Cognition   Overall Cognitive Status Impaired   Arousal/Participation Alert   Attention Attends with cues to redirect   Orientation Level Oriented X4   Memory Decreased short term memory   Following Commands Follows one step commands with increased time or repetition   Cognition Assessment Tools MOCA   Score 22   Activity Tolerance   Activity Tolerance Patient limited by pain   Medical Staff Made Aware nsg, P T      Assessment   Assessment Pt seen for 60 min tx session with focus on functional balance, functional mobility, ADL status, education, transfer safety, and cognition  Pt able to tolerate OOB mobility; sitting balance=f+/f, standing balance=f/f-  Pt able to demonstrate Emilia with her LE ADLs  Mild cognitive deficits noted with executive function, visuospatial, attention, language, abstraction, and memory  Pt with limited awareness of cognitive deficits  Pt required verbal cues to maintain transfer safety  Pt continues to demonstrate appropriateness for inpt rehab to improve her overall level of independence  Tx tolerated well  Pt pleasant and cooperative with tx  Will continue  Plan   Treatment Interventions ADL retraining;Functional transfer training; Endurance training;Cognitive reorientation;Patient/family training;Equipment evaluation/education; Compensatory technique education   Goal Expiration Date 10/16/19   OT Treatment Day 1   OT Frequency 3-5x/wk   Recommendation   OT Discharge Recommendation Short Term Rehab   Barthel Index   Feeding 10   Bathing 0   Grooming Score 5   Dressing Score 5   Bladder Score 10   Bowels Score 10   Toilet Use Score 5   Transfers (Bed/Chair) Score 10   Mobility (Level Surface) Score 0   Stairs Score 0   Barthel Index Score 55   Lorena Rios, OT

## 2019-10-02 NOTE — PHYSICAL THERAPY NOTE
Physical Therapy Treatment Note         10/02/19 1871   Pain Assessment   Pain Assessment 0-10   Pain Score   (pt reports no pain just sore and tender)   Pain Type Acute pain   Pain Location Foot   Pain Orientation Left   Restrictions/Precautions   LLE Weight Bearing Per Order WBAT  (with surgical shoe)   Braces or Orthoses   (surgical shoe L le)   Other Precautions Bed Alarm; Fall Risk;Multiple lines   General   Chart Reviewed Yes   Family/Caregiver Present No   Cognition   Overall Cognitive Status Impaired   Arousal/Participation Alert; Responsive; Cooperative   Attention Attends with cues to redirect   Orientation Level Oriented X4   Memory Decreased short term memory   Following Commands Follows one step commands with increased time or repetition   Subjective   Subjective " I have no pain it's just sore and tender "     Bed Mobility   Supine to Sit 5  Supervision   Additional items Assist x 1;HOB elevated; Bedrails; Increased time required   Sit to Supine 4  Minimal assistance   Additional items Assist x 1;Bedrails;HOB elevated;LE management; Increased time required;Verbal cues   Transfers   Sit to Stand 4  Minimal assistance   Additional items Assist x 1; Increased time required;Verbal cues   Stand to Sit 4  Minimal assistance   Additional items Assist x 1; Increased time required;Verbal cues   Ambulation/Elevation   Gait pattern Forward Flexion;Narrow JANE; Short stride; Excessively slow; Inconsistent kishore;Decreased L stance   Gait Assistance 4  Minimal assist   Additional items Assist x 1;Verbal cues   Assistive Device Rolling walker   Distance 40' x2, one standing rest break      Balance   Static Sitting Good   Dynamic Sitting Fair   Static Standing Fair -   Dynamic Standing Poor +   Ambulatory Poor +   Endurance Deficit   Endurance Deficit Yes   Endurance Deficit Description fatigue, weakness   Activity Tolerance   Activity Tolerance Patient limited by fatigue   Medical Staff Made Aware OT LINDA   Exercises   Quad Sets Supine;10 reps;AROM; Bilateral   Heelslides Supine;10 reps;AROM; Bilateral   Hip Flexion Supine;AROM; Bilateral  (x 6 reps  )   Hip Abduction Supine;10 reps;AROM; Bilateral   Hip Adduction Supine;10 reps;AROM; Bilateral   Knee AROM Short Arc Quad Supine;10 reps;AROM; Bilateral   Knee AROM Long Arc Quad Sitting;10 reps;AROM; Bilateral   Equipment Use   Comments assist to don surgical shoe to l foot  Assessment   Prognosis Good   Problem List Decreased strength;Decreased range of motion;Decreased endurance; Impaired balance;Decreased mobility;Orthopedic restrictions; Impaired hearing   Assessment Pt showing improved ability to perform bed mobility, transfers and ambulation  Less assistance for all aspects of mobility  Pt performs bed mobility with supervision for supine to sit, min assist x1 for sit to supine assist to lift le's into bed, min assist for sit<-->stand and min assist x1 for ambulation on levels with use of rw  Pt progressed with ambulation distances to 40' x2 with one standing rest break due to fatigue, no c/o pain reported but reports tenderness and soreness in L foot  PT  Performs supine and seated b/l le arom exercises x 6- 10 reps to tolerance  PT demonstrates difficulty in performing SLR but no physical assistance required  Pt demonstrates improved clearance of L foot during swing through  Pt demonstrates difficulty reaching feet and requires assistance to don/ doff surgical shoe to l foot  Pt continues to be functioning below baseline level of mobility and will benefit from STR at d/c to maximize functional outcomes and independence for safe d/c to home as pt lives alone  Goals   Patient Goals " to be able to walk and function like I did before "     [facility] Expiration Date 10/11/19   PT Treatment Day 1   Plan   Treatment/Interventions Functional transfer training;LE strengthening/ROM; Therapeutic exercise; Endurance training;Patient/family training;Gait training;Bed mobility; Equipment eval/education;OT   Progress Progressing toward goals   PT Frequency   (3-5x/week)   Recommendation   Recommendation Short-term skilled PT   PT - OK to Discharge Yes  (to rehab   )        10/02/19 1621   Pain Assessment   Pain Assessment 0-10   Pain Score   (pt reports no pain just sore and tender)   Pain Type Acute pain   Pain Location Foot   Pain Orientation Left   Restrictions/Precautions   LLE Weight Bearing Per Order WBAT  (with surgical shoe)   Braces or Orthoses   (surgical shoe L le)   Other Precautions Bed Alarm; Fall Risk;Multiple lines   General   Chart Reviewed Yes   Family/Caregiver Present No   Cognition   Overall Cognitive Status Impaired   Arousal/Participation Alert; Responsive; Cooperative   Attention Attends with cues to redirect   Orientation Level Oriented X4   Memory Decreased short term memory   Following Commands Follows one step commands with increased time or repetition   Subjective   Subjective " I have no pain it's just sore and tender "     Bed Mobility   Supine to Sit 5  Supervision   Additional items Assist x 1;HOB elevated; Bedrails; Increased time required   Sit to Supine 4  Minimal assistance   Additional items Assist x 1;Bedrails;HOB elevated;LE management; Increased time required;Verbal cues   Transfers   Sit to Stand 4  Minimal assistance   Additional items Assist x 1; Increased time required;Verbal cues   Stand to Sit 4  Minimal assistance   Additional items Assist x 1; Increased time required;Verbal cues   Ambulation/Elevation   Gait pattern Forward Flexion;Narrow JANE; Short stride; Excessively slow; Inconsistent kishore;Decreased L stance   Gait Assistance 4  Minimal assist   Additional items Assist x 1;Verbal cues   Assistive Device Rolling walker   Distance 40' x2, one standing rest break      Balance   Static Sitting Good   Dynamic Sitting Fair   Static Standing Fair -   Dynamic Standing Poor +   Ambulatory Poor +   Endurance Deficit   Endurance Deficit Yes   Endurance Deficit Description fatigue, weakness   Activity Tolerance   Activity Tolerance Patient limited by fatigue   Medical Staff Made Aware OT LINDA   Exercises   Quad Sets Supine;10 reps;AROM; Bilateral   Heelslides Supine;10 reps;AROM; Bilateral   Hip Flexion Supine;AROM; Bilateral  (x 6 reps  )   Hip Abduction Supine;10 reps;AROM; Bilateral   Hip Adduction Supine;10 reps;AROM; Bilateral   Knee AROM Short Arc Quad Supine;10 reps;AROM; Bilateral   Knee AROM Long Arc Quad Sitting;10 reps;AROM; Bilateral   Equipment Use   Comments assist to don surgical shoe to l foot  Assessment   Prognosis Good   Problem List Decreased strength;Decreased range of motion;Decreased endurance; Impaired balance;Decreased mobility;Orthopedic restrictions; Impaired hearing   Assessment Pt showing improved ability to perform bed mobility, transfers and ambulation  Less assistance for all aspects of mobility  Pt performs bed mobility with supervision for supine to sit, min assist x1 for sit to supine assist to lift le's into bed, min assist for sit<-->stand and min assist x1 for ambulation on levels with use of rw  Pt progressed with ambulation distances to 40' x2 with one standing rest break due to fatigue, no c/o pain reported but reports tenderness and soreness in L foot  PT  Performs supine and seated b/l le arom exercises x 6- 10 reps to tolerance  PT demonstrates difficulty in performing SLR but no physical assistance required  Pt demonstrates improved clearance of L foot during swing through  Pt demonstrates difficulty reaching feet and requires assistance to don/ doff surgical shoe to l foot  Pt continues to be functioning below baseline level of mobility and will benefit from STR at d/c to maximize functional outcomes and independence for safe d/c to home as pt lives alone       Goals   Patient Goals " to be able to walk and function like I did before "     [facility] Expiration Date 10/11/19   PT Treatment Day 1   Plan   Treatment/Interventions Functional transfer training;LE strengthening/ROM; Therapeutic exercise; Endurance training;Patient/family training;Gait training;Bed mobility; Equipment eval/education;OT   Progress Progressing toward goals   PT Frequency   (3-5x/week)   Recommendation   Recommendation Short-term skilled PT   PT - OK to Discharge Yes  (to rehab   )     Alma Rosa Huitron, PTA

## 2019-10-02 NOTE — PLAN OF CARE
Problem: OCCUPATIONAL THERAPY ADULT  Goal: Performs self-care activities at highest level of function for planned discharge setting  See evaluation for individualized goals  Description  Treatment Interventions: ADL retraining, Functional transfer training, UE strengthening/ROM, Endurance training, Cognitive reorientation, Patient/family training, Equipment evaluation/education, Compensatory technique education, Continued evaluation          See flowsheet documentation for full assessment, interventions and recommendations  Note:   Limitation: Decreased ADL status, Decreased UE ROM, Decreased UE strength, Decreased endurance, Decreased high-level ADLs  Prognosis: Good  Assessment: Pt seen for 60 min tx session with focus on functional balance, functional mobility, ADL status, education, transfer safety, and cognition  Pt able to tolerate OOB mobility; sitting balance=f+/f, standing balance=f/f-  Pt able to demonstrate Emilia with her LE ADLs  Mild cognitive deficits noted with executive function, visuospatial, attention, language, abstraction, and memory  Pt with limited awareness of cognitive deficits  Pt required verbal cues to maintain transfer safety  Pt continues to demonstrate appropriateness for inpt rehab to improve her overall level of independence  Tx tolerated well  Pt pleasant and cooperative with tx  Will continue        OT Discharge Recommendation: Short Term Rehab

## 2019-10-02 NOTE — SOCIAL WORK
CM received a PC from pt's son Glory Guerra in regards to [address]  He asks that we send a referral to [facility]  CM has submitted referral  Will advise of determination

## 2019-10-02 NOTE — PLAN OF CARE
Problem: PHYSICAL THERAPY ADULT  Goal: Performs mobility at highest level of function for planned discharge setting  See evaluation for individualized goals  Description  Treatment/Interventions: Functional transfer training, LE strengthening/ROM, Elevations, Therapeutic exercise, Endurance training, Patient/family training, Equipment eval/education, Bed mobility, Gait training, Compensatory technique education, Continued evaluation, Spoke to nursing, OT  Equipment Recommended: Mirian Meng       See flowsheet documentation for full assessment, interventions and recommendations  Outcome: Progressing  Note:   Prognosis: Good  Problem List: Decreased strength, Decreased range of motion, Decreased endurance, Impaired balance, Decreased mobility, Orthopedic restrictions, Impaired hearing  Assessment: Pt showing improved ability to perform bed mobility, transfers and ambulation  Less assistance for all aspects of mobility  Pt performs bed mobility with supervision for supine to sit, min assist x1 for sit to supine assist to lift le's into bed, min assist for sit<-->stand and min assist x1 for ambulation on levels with use of rw  Pt progressed with ambulation distances to 40' x2 with one standing rest break due to fatigue, no c/o pain reported but reports tenderness and soreness in L foot  PT  Performs supine and seated b/l le arom exercises x 6- 10 reps to tolerance  PT demonstrates difficulty in performing SLR but no physical assistance required  Pt demonstrates improved clearance of L foot during swing through  Pt demonstrates difficulty reaching feet and requires assistance to don/ doff surgical shoe to l foot  Pt continues to be functioning below baseline level of mobility and will benefit from STR at d/c to maximize functional outcomes and independence for safe d/c to home as pt lives alone      Barriers to Discharge: Inaccessible home environment, Decreased caregiver support     Recommendation: Short-term skilled PT     PT - OK to Discharge: Yes(to rehab   )    See flowsheet documentation for full assessment

## 2019-10-02 NOTE — ASSESSMENT & PLAN NOTE
Due to vasovagal syncope related to acute gastroenteritis with nausea/vomiting/diarrhea  CT head negative for stroke  Telemetry with sinus bradycardia but no malignant arrhythmia  Resolved  Activity as tolerated with PT

## 2019-10-02 NOTE — ASSESSMENT & PLAN NOTE
· WIth maxilary sinus opacification and small right upper incisor periapical abscess  · Fairly asymptomatic  · OP ENT and OMFS follow ups  · Called OMFS office today and left a voicemail in their physician line

## 2019-10-02 NOTE — ASSESSMENT & PLAN NOTE
Left 4th and 5th metatarsal fracture, traumatic  Podiatry evaluation done    Conservative treatment only and  will not need surgery  STR placement and OP podiatry follow up

## 2019-10-03 PROCEDURE — 97110 THERAPEUTIC EXERCISES: CPT

## 2019-10-03 PROCEDURE — 97530 THERAPEUTIC ACTIVITIES: CPT

## 2019-10-03 PROCEDURE — 99232 SBSQ HOSP IP/OBS MODERATE 35: CPT | Performed by: INTERNAL MEDICINE

## 2019-10-03 RX ADMIN — SODIUM CHLORIDE 75 ML/HR: 0.9 INJECTION, SOLUTION INTRAVENOUS at 04:08

## 2019-10-03 RX ADMIN — PANTOPRAZOLE SODIUM 20 MG: 20 TABLET, DELAYED RELEASE ORAL at 04:06

## 2019-10-03 RX ADMIN — OXYCODONE HYDROCHLORIDE 2.5 MG: 5 TABLET ORAL at 04:05

## 2019-10-03 RX ADMIN — ASPIRIN 81 MG 81 MG: 81 TABLET ORAL at 08:07

## 2019-10-03 RX ADMIN — ATORVASTATIN CALCIUM 40 MG: 40 TABLET, FILM COATED ORAL at 17:23

## 2019-10-03 RX ADMIN — ENOXAPARIN SODIUM 40 MG: 40 INJECTION SUBCUTANEOUS at 08:07

## 2019-10-03 RX ADMIN — AMLODIPINE BESYLATE 5 MG: 5 TABLET ORAL at 17:23

## 2019-10-03 RX ADMIN — CLOPIDOGREL BISULFATE 75 MG: 75 TABLET ORAL at 08:07

## 2019-10-03 NOTE — ASSESSMENT & PLAN NOTE
· WIth maxilary sinus opacification and small right upper incisor periapical abscess  · Fairly asymptomatic  · OP ENT and OMFS follow ups  · OMFS appointment scheduled by myself on 10/8/19 1030 am

## 2019-10-03 NOTE — PLAN OF CARE
Problem: PHYSICAL THERAPY ADULT  Goal: Performs mobility at highest level of function for planned discharge setting  See evaluation for individualized goals  Description  Treatment/Interventions: Functional transfer training, LE strengthening/ROM, Elevations, Therapeutic exercise, Endurance training, Patient/family training, Equipment eval/education, Bed mobility, Gait training, Compensatory technique education, Continued evaluation, Spoke to nursing, OT  Equipment Recommended: Tanja Villa       See flowsheet documentation for full assessment, interventions and recommendations  Outcome: Progressing  Note:   Prognosis: Fair  Problem List: Decreased strength, Decreased endurance, Impaired balance, Decreased mobility, Impaired judgement, Decreased safety awareness  Assessment: Pt is making slow but steady progress with the use of a rolling walker  Denies pain however states "it feels sore because I think I overdid it yesterday "  Cues required for safety with sit to stand transfers for hand placement  Min assist at time for walker management during ambulation trial   Completed seated BLE exercises to conclude session  Pt declined further activity     Chair alarm active post session  Pt would benefit from continued physical therapy to maximize functional independence  Barriers to Discharge: Inaccessible home environment, Decreased caregiver support     Recommendation: (Rehab)     PT - OK to Discharge: Yes    See flowsheet documentation for full assessment

## 2019-10-03 NOTE — PROGRESS NOTES
Progress Note - Karo Mane 8/7/1929, 80 y o  female MRN: [id number]    Unit/Bed#: E2 -01 Encounter: [id number]    Primary Care Provider: Irish Ormond, MD   Date and time admitted to hospital: 9/30/2019  5:50 PM        * Syncope and collapse  Assessment & Plan  Due to vasovagal syncope related to acute gastroenteritis with nausea/vomiting/diarrhea  CT head negative for stroke  Telemetry with sinus bradycardia but no malignant arrhythmia  Resolved  Activity as tolerated with PT       Gastroenteritis, acute  Assessment & Plan  Likely viral  Resolved  C  Diff PCR negative  Continue regular diet as tolerated    Closed fracture of fourth metatarsal bone  Assessment & Plan  Left 4th and 5th metatarsal fracture, traumatic  Podiatry evaluation done  Conservative treatment only and  will not need surgery  STR placement and OP podiatry follow up    Sinus bradycardia  Assessment & Plan  · Secondary to advanced age and aged conduction system  · Avoid AV jose alfredo blocking agent    Abnormal CT scan, sinus  Assessment & Plan  · WIth maxilary sinus opacification and small right upper incisor periapical abscess  · Fairly asymptomatic  · OP ENT and OM[facility] follow ups  · [facility] appointment scheduled by myself on 10/8/19 1030 am    Essential hypertension  Assessment & Plan  Continue amlodipine 5 mg daily with holds    Gastroesophageal reflux  Assessment & Plan  Stable on Protonix      VTE Pharmacologic Prophylaxis:   Pharmacologic: Enoxaparin (Lovenox)  Mechanical VTE Prophylaxis in Place: No    Patient Centered Rounds: Discussed with her nurse    Discussions with Specialists or Other Care Team Provider: spoke with OMFS office Molly Terry and scheduled her appointment    Education and Discussions with Family / Patient: spoke with son Tamara Philip and gave update    Time Spent for Care: 25 minutes  More than 50% of total time spent on counseling and coordination of care as described above      Current Length of Stay: 3 day(s)    Current Patient Status: Inpatient   Certification Statement: The patient will continue to require additional inpatient hospital stay due to str placement    Discharge Plan: dc in 24 hours    Code Status: Level 3 - DNAR and DNI    Subjective:   Nasal congestion  Queasy  Poor appetite  No diarrhea or fever    Objective:     Vitals:   Temp (24hrs), Av 9 °F (36 6 °C), Min:97 7 °F (36 5 °C), Max:98 1 °F (36 7 °C)    Temp:  [97 7 °F (36 5 °C)-98 1 °F (36 7 °C)] 98 °F (36 7 °C)  HR:  [61-67] 66  Resp:  [16-20] 20  BP: (132-157)/(74-78) 132/78  SpO2:  [98 %-99 %] 98 %  Body mass index is 20 08 kg/m²  Input and Output Summary (last 24 hours): Intake/Output Summary (Last 24 hours) at 10/3/2019 1452  Last data filed at 10/3/2019 1412  Gross per 24 hour   Intake 900 ml   Output 2850 ml   Net -1950 ml       Physical Exam:     Physical Exam   Constitutional: She is oriented to person, place, and time  She appears well-developed  No distress  HENT:   Chronic right cheek eschar   Eyes: No scleral icterus  Neck: No JVD present  Cardiovascular: Regular rhythm  Exam reveals no gallop and no friction rub  No murmur heard  Pulmonary/Chest: Effort normal  No stridor  No respiratory distress  She has no wheezes  Abdominal: Soft  She exhibits no distension  There is no tenderness  There is no guarding  Musculoskeletal: She exhibits no edema or deformity  Neurological: She is alert and oriented to person, place, and time  Skin: Skin is warm and dry  She is not diaphoretic  Psychiatric: She has a normal mood and affect           Additional Data:     Labs:    Results from last 7 days   Lab Units 10/02/19  0543   WBC Thousand/uL 6 81   HEMOGLOBIN g/dL 11 9   HEMATOCRIT % 35 5   PLATELETS Thousands/uL 186   NEUTROS PCT % 69   LYMPHS PCT % 20   MONOS PCT % 10   EOS PCT % 1     Results from last 7 days   Lab Units 10/02/19  0543  19  1818   SODIUM mmol/L 144   < > 140   POTASSIUM mmol/L 3 6   < > 3 6   CHLORIDE mmol/L 111*   < > 104   CO2 mmol/L 26   < > 25   BUN mg/dL 10   < > 15   CREATININE mg/dL 0 73   < > 0 75   ANION GAP mmol/L 7   < > 11   CALCIUM mg/dL 8 6   < > 9 5   ALBUMIN g/dL  --   --  3 9   TOTAL BILIRUBIN mg/dL  --   --  0 56   ALK PHOS U/L  --   --  84   ALT U/L  --   --  16   AST U/L  --   --  18   GLUCOSE RANDOM mg/dL 110   < > 156*    < > = values in this interval not displayed  * I Have Reviewed All Lab Data Listed Above  * Additional Pertinent Lab Tests Reviewed: All Labs Within Last 24 Hours Reviewed    Imaging:    Imaging Reports Reviewed Today Include: none  Imaging Personally Reviewed by Myself Includes:  none    Recent Cultures (last 7 days):     Results from last 7 days   Lab Units 10/01/19  0027   C DIFF TOXIN B  NEGATIVE for C difficle toxin by PCR  Last 24 Hours Medication List:     Current Facility-Administered Medications:  acetaminophen 650 mg Oral Q6H PRN ANGI Cooper    amLODIPine 5 mg Oral After [id number] [facility], CRNP    aspirin 81 mg Oral Daily Guero Elkins, CRNP    atorvastatin 40 mg Oral QPM Guero Elkins, CRNP    clopidogrel 75 mg Oral Daily Guero Elkins, CRNP    enoxaparin 40 mg Subcutaneous Daily Guero Elkins, ANGI    ondansetron 4 mg Intravenous Q6H PRN Guero Elkins, CRNP    oxyCODONE 2 5 mg Oral Q4H PRN Guero Elkins, CRNP    pantoprazole 20 mg Oral Early Morning Guero Elkins, ANGI    sodium chloride 75 mL/hr Intravenous Continuous ANGI Cooper Last Rate: 75 mL/hr (10/03/19 0408)        Today, Patient Was Seen By: Amy Head MD    ** Please Note: Dictation voice to text software may have been used in the creation of this document   **

## 2019-10-03 NOTE — PHYSICAL THERAPY NOTE
Physical Therapy Progress Note     10/03/19 1143   Pain Assessment   Pain Assessment No/denies pain   Pain Score No Pain   Restrictions/Precautions   LLE Weight Bearing Per Order WBAT  (surgical shoe)   Other Precautions Fall Risk; Chair Alarm   General   Family/Caregiver Present No   Cognition   Overall Cognitive Status [facility]   Arousal/Participation Alert; Cooperative   Transfers   Sit to Stand   (contact guard assist)   Additional items Assist x 1   Stand to Sit 5  Supervision   Additional items Assist x 1;Verbal cues   Ambulation/Elevation   Gait pattern Antalgic  (slow, short step length)   Gait Assistance 4  Minimal assist  (contact guard assist)   Additional items Assist x 1   Assistive Device Rolling walker   Distance 40 feet   Balance   Static Sitting Fair   Static Standing Fair   Dynamic Standing Fair -   Ambulatory Fair -   Endurance Deficit   Endurance Deficit Yes   Endurance Deficit Description fatigue    Activity Tolerance   Activity Tolerance Patient limited by fatigue   Nurse [address] to see per RN   Exercises   Hip Flexion Sitting;15 reps;AROM; Bilateral   Knee AROM Long Arc Quad Sitting;15 reps;AROM; Bilateral   Ankle Pumps Sitting;15 reps;AROM; Bilateral   Assessment   Prognosis Fair   Problem List Decreased strength;Decreased endurance; Impaired balance;Decreased mobility; Impaired judgement;Decreased safety awareness   Assessment Pt is making slow but steady progress with the use of a rolling walker  Denies pain however states "it feels sore because I think I overdid it yesterday "  Cues required for safety with sit to stand transfers for hand placement  Min assist at time for walker management during ambulation trial   Completed seated BLE exercises to conclude session  Pt declined further activity     Chair alarm active post session  Pt would benefit from continued physical therapy to maximize functional independence     Goals   Patient Goals Get stronger   STG Expiration Date 10/11/19   Short Term Goal #1 10 days :1)  Pt will perform bed mobility with Gildardo demonstrating appropriate technique 100% of the time in order to improve function  2)  Perform all transfers with Gildardo demonstrating safe and appropriate technique 100% of the time in order to improve ability to negotiate safely in home environment  3) Amb with least restrictive AD > 150'x1 with mod I in order to demonstrate ability to negotiate in home environment  4)  Improve overall strength and balance 1/2 grade in order to optimize ability to perform functional tasks and reduce fall risk  5) Increase activity tolerance to 45 minutes in order to improve endurance to functional tasks  6)  Negotiate stairs using most appropriate technique and S in order to be able to negotiate safely in home environment  7) PT for ongoing patient and family/caregiver education, DME needs and d/c planning in order to promote highest level of function in least restrictive environment  PT Treatment Day 2   Plan   Treatment/Interventions Functional transfer training;LE strengthening/ROM; Therapeutic exercise; Endurance training;Patient/family training;Bed mobility;Gait training;Spoke to nursing   Progress Progressing toward goals   PT Frequency   (3-5x/week)   Recommendation   Recommendation   (Rehab)   Equipment Recommended Gino Bhandari  (GENOVEVA)     Freida Vera, ALAINA

## 2019-10-03 NOTE — PLAN OF CARE
Problem: Prexisting or High Potential for Compromised Skin Integrity  Goal: Skin integrity is maintained or improved  Description  INTERVENTIONS:  - Identify patients at risk for skin breakdown  - Assess and monitor skin integrity  - Assess and monitor nutrition and hydration status  - Monitor labs   - Assess for incontinence   - Turn and reposition patient  - Assist with mobility/ambulation  - Relieve pressure over bony prominences  - Avoid friction and shearing  - Provide appropriate hygiene as needed including keeping skin clean and dry  - Evaluate need for skin moisturizer/barrier cream  - Collaborate with interdisciplinary team   - Patient/family teaching  - Consider wound care consult   Outcome: Progressing     Problem: PAIN - ADULT  Goal: Verbalizes/displays adequate comfort level or baseline comfort level  Description  Interventions:  - Encourage patient to monitor pain and request assistance  - Assess pain using appropriate pain scale  - Administer analgesics based on type and severity of pain and evaluate response  - Implement non-pharmacological measures as appropriate and evaluate response  - Consider cultural and social influences on pain and pain management  - Notify physician/advanced practitioner if interventions unsuccessful or patient reports new pain  Outcome: Progressing     Problem: INFECTION - ADULT  Goal: Absence or prevention of progression during hospitalization  Description  INTERVENTIONS:  - Assess and monitor for signs and symptoms of infection  - Monitor lab/diagnostic results  - Monitor all insertion sites, i e  indwelling lines, tubes, and drains  - Monitor endotracheal if appropriate and nasal secretions for changes in amount and color  - [place] appropriate cooling/warming therapies per order  - Administer medications as ordered  - Instruct and encourage patient and family to use good hand hygiene technique  - Identify and instruct in appropriate isolation precautions for identified infection/condition  Outcome: Progressing  Goal: Absence of fever/infection during neutropenic period  Description  INTERVENTIONS:  - Monitor WBC    Outcome: Progressing     Problem: SAFETY ADULT  Goal: Patient will remain free of falls  Description  INTERVENTIONS:  - Assess patient frequently for physical needs  -  Identify cognitive and physical deficits and behaviors that affect risk of falls    -  [place] fall precautions as indicated by assessment   - Educate patient/family on patient safety including physical limitations  - Instruct patient to call for assistance with activity based on assessment  - Modify environment to reduce risk of injury  - Consider OT/PT consult to assist with strengthening/mobility  Outcome: Progressing  Goal: Maintain or return to baseline ADL function  Description  INTERVENTIONS:  -  Assess patient's ability to carry out ADLs; assess patient's baseline for ADL function and identify physical deficits which impact ability to perform ADLs (bathing, care of mouth/teeth, toileting, grooming, dressing, etc )  - Assess/evaluate cause of self-care deficits   - Assess range of motion  - Assess patient's mobility; develop plan if impaired  - Assess patient's need for assistive devices and provide as appropriate  - Encourage maximum independence but intervene and supervise when necessary  - Involve family in performance of ADLs  - Assess for home care needs following discharge   - Consider OT consult to assist with ADL evaluation and planning for discharge  - Provide patient education as appropriate  Outcome: Progressing  Goal: Maintain or return mobility status to optimal level  Description  INTERVENTIONS:  - Assess patient's baseline mobility status (ambulation, transfers, stairs, etc )    - Identify cognitive and physical deficits and behaviors that affect mobility  - Identify mobility aids required to assist with transfers and/or ambulation (gait belt, sit-to-stand, lift, walker, cane, etc )  - [place] fall precautions as indicated by assessment  - Record patient progress and toleration of activity level on Mobility SBAR; progress patient to next Phase/Stage  - Instruct patient to call for assistance with activity based on assessment  - Consider rehabilitation consult to assist with strengthening/weightbearing, etc   Outcome: Progressing     Problem: DISCHARGE PLANNING  Goal: Discharge to home or other facility with appropriate resources  Description  INTERVENTIONS:  - Identify barriers to discharge w/patient and caregiver  - Arrange for needed discharge resources and transportation as appropriate  - Identify discharge learning needs (meds, wound care, etc )  - Arrange for interpretive services to assist at discharge as needed  - Refer to Case Management Department for coordinating discharge planning if the patient needs post-hospital services based on physician/advanced practitioner order or complex needs related to functional status, cognitive ability, or social support system  Outcome: Progressing     Problem: Knowledge Deficit  Goal: Patient/family/caregiver demonstrates understanding of disease process, treatment plan, medications, and discharge instructions  Description  Complete learning assessment and assess knowledge base  Interventions:  - Provide teaching at level of understanding  - Provide teaching via preferred learning methods  Outcome: Progressing     Problem: Potential for Falls  Goal: Patient will remain free of falls  Description  INTERVENTIONS:  - Assess patient frequently for physical needs  -  Identify cognitive and physical deficits and behaviors that affect risk of falls    -  [place] fall precautions as indicated by assessment   - Educate patient/family on patient safety including physical limitations  - Instruct patient to call for assistance with activity based on assessment  - Modify environment to reduce risk of injury  - Consider OT/PT consult to assist with strengthening/mobility  Outcome: Progressing     Problem: COPING  Goal: Pt/Family able to verbalize concerns and demonstrate effective coping strategies  Description  INTERVENTIONS:  - Assist patient/family to identify coping skills, available support systems and cultural and spiritual values  - Provide emotional support, including active listening and acknowledgement of concerns of patient and caregivers  - Reduce environmental stimuli, as able  - Provide patient education  - Assess for spiritual pain/suffering and initiate spiritual care, including notification of Pastoral Care or syed based community as needed  - Assess effectiveness of coping strategies  Outcome: Progressing  Goal: Will report anxiety at manageable levels  Description  INTERVENTIONS:  - Administer medication as ordered  - Teach and encourage coping skills  - Provide emotional support  - Assess patient/family for anxiety and ability to cope  Outcome: Progressing

## 2019-10-04 VITALS
TEMPERATURE: 97.5 F | RESPIRATION RATE: 16 BRPM | OXYGEN SATURATION: 97 % | BODY MASS INDEX: 20.08 KG/M2 | SYSTOLIC BLOOD PRESSURE: 164 MMHG | HEART RATE: 56 BPM | DIASTOLIC BLOOD PRESSURE: 75 MMHG | WEIGHT: 111.55 LBS

## 2019-10-04 PROBLEM — K52.9 GASTROENTERITIS, ACUTE: Status: RESOLVED | Noted: 2019-09-30 | Resolved: 2019-10-04

## 2019-10-04 PROBLEM — R55 SYNCOPE AND COLLAPSE: Status: RESOLVED | Noted: 2019-09-30 | Resolved: 2019-10-04

## 2019-10-04 PROCEDURE — 97763 ORTHC/PROSTC MGMT SBSQ ENC: CPT

## 2019-10-04 PROCEDURE — 99239 HOSP IP/OBS DSCHRG MGMT >30: CPT | Performed by: INTERNAL MEDICINE

## 2019-10-04 PROCEDURE — 97530 THERAPEUTIC ACTIVITIES: CPT

## 2019-10-04 RX ADMIN — CLOPIDOGREL BISULFATE 75 MG: 75 TABLET ORAL at 08:04

## 2019-10-04 RX ADMIN — ASPIRIN 81 MG 81 MG: 81 TABLET ORAL at 08:04

## 2019-10-04 RX ADMIN — PANTOPRAZOLE SODIUM 20 MG: 20 TABLET, DELAYED RELEASE ORAL at 06:16

## 2019-10-04 RX ADMIN — ENOXAPARIN SODIUM 40 MG: 40 INJECTION SUBCUTANEOUS at 08:04

## 2019-10-04 NOTE — PLAN OF CARE
Problem: Prexisting or High Potential for Compromised Skin Integrity  Goal: Skin integrity is maintained or improved  Description  INTERVENTIONS:  - Identify patients at risk for skin breakdown  - Assess and monitor skin integrity  - Assess and monitor nutrition and hydration status  - Monitor labs   - Assess for incontinence   - Turn and reposition patient  - Assist with mobility/ambulation  - Relieve pressure over bony prominences  - Avoid friction and shearing  - Provide appropriate hygiene as needed including keeping skin clean and dry  - Evaluate need for skin moisturizer/barrier cream  - Collaborate with interdisciplinary team   - Patient/family teaching  - Consider wound care consult   Outcome: Progressing     Problem: PAIN - ADULT  Goal: Verbalizes/displays adequate comfort level or baseline comfort level  Description  Interventions:  - Encourage patient to monitor pain and request assistance  - Assess pain using appropriate pain scale  - Administer analgesics based on type and severity of pain and evaluate response  - Implement non-pharmacological measures as appropriate and evaluate response  - Consider cultural and social influences on pain and pain management  - Notify physician/advanced practitioner if interventions unsuccessful or patient reports new pain  Outcome: Progressing     Problem: INFECTION - ADULT  Goal: Absence or prevention of progression during hospitalization  Description  INTERVENTIONS:  - Assess and monitor for signs and symptoms of infection  - Monitor lab/diagnostic results  - Monitor all insertion sites  - [place] appropriate cooling/warming therapies per order  - Administer medications as ordered  - Instruct and encourage patient and family to use good hand hygiene technique   Outcome: Progressing     Problem: SAFETY ADULT  Goal: Patient will remain free of falls  Description  INTERVENTIONS:  - Assess patient frequently for physical needs  -  Identify cognitive and physical deficits and behaviors that affect risk of falls    -  [place] fall precautions as indicated by assessment   - Educate patient/family on patient safety including physical limitations  - Instruct patient to call for assistance with activity based on assessment  - Modify environment to reduce risk of injury  - Consider OT/PT consult to assist with strengthening/mobility  Outcome: Progressing  Goal: Maintain or return to baseline ADL function  Description  INTERVENTIONS:  -  Assess patient's ability to carry out ADLs; assess patient's baseline for ADL function and identify physical deficits which impact ability to perform ADLs (bathing, care of mouth/teeth, toileting, grooming, dressing, etc )  - Assess/evaluate cause of self-care deficits   - Assess range of motion  - Assess patient's mobility; develop plan if impaired  - Assess patient's need for assistive devices and provide as appropriate  - Encourage maximum independence but intervene and supervise when necessary  - Involve family in performance of ADLs  - Assess for home care needs following discharge   - Consider OT consult to assist with ADL evaluation and planning for discharge  - Provide patient education as appropriate  Outcome: Progressing  Goal: Maintain or return mobility status to optimal level  Description  INTERVENTIONS:  - Assess patient's baseline mobility status (ambulation, transfers, stairs, etc )    - Identify cognitive and physical deficits and behaviors that affect mobility  - Identify mobility aids required to assist with transfers and/or ambulation (gait belt, sit-to-stand, lift, walker, cane, etc )  - [place] fall precautions as indicated by assessment  - Record patient progress and toleration of activity level on Mobility SBAR; progress patient to next Phase/Stage  - Instruct patient to call for assistance with activity based on assessment  - Consider rehabilitation consult to assist with strengthening/weightbearing, etc   Outcome: Progressing     Problem: DISCHARGE PLANNING  Goal: Discharge to home or other facility with appropriate resources  Description  INTERVENTIONS:  - Identify barriers to discharge w/patient and caregiver  - Arrange for needed discharge resources and transportation as appropriate  - Identify discharge learning needs (meds, wound care, etc )  - Refer to Case Management Department for coordinating discharge planning if the patient needs post-hospital services based on physician/advanced practitioner order    Outcome: Progressing     Problem: Knowledge Deficit  Goal: Patient/family/caregiver demonstrates understanding of disease process, treatment plan, medications, and discharge instructions  Description  Complete learning assessment and assess knowledge base  Interventions:  - Provide teaching at level of understanding  - Provide teaching via preferred learning methods  Outcome: Progressing     Problem: Potential for Falls  Goal: Patient will remain free of falls  Description  INTERVENTIONS:  - Assess patient frequently for physical needs  -  Identify cognitive and physical deficits and behaviors that affect risk of falls    -  [place] fall precautions as indicated by assessment   - Educate patient/family on patient safety including physical limitations  - Instruct patient to call for assistance with activity based on assessment  - Modify environment to reduce risk of injury  - Consider OT/PT consult to assist with strengthening/mobility  Outcome: Progressing     Problem: COPING  Goal: Pt/Family able to verbalize concerns and demonstrate effective coping strategies  Description  INTERVENTIONS:  - Assist patient/family to identify coping skills, available support systems and cultural and spiritual values  - Provide emotional support, including active listening and acknowledgement of concerns of patient and caregivers  - Reduce environmental stimuli, as able  - Provide patient education  - Assess for spiritual pain/suffering and initiate spiritual care, including notification of Pastoral Care or syed based community as needed  - Assess effectiveness of coping strategies  Outcome: Progressing  Goal: Will report anxiety at manageable levels  Description  INTERVENTIONS:  - Administer medication as ordered  - Teach and encourage coping skills  - Provide emotional support  - Assess patient/family for anxiety and ability to cope  Outcome: Progressing

## 2019-10-04 NOTE — DISCHARGE SUMMARY
Discharge- Gabriela Cora 8/7/1929, 80 y o  female MRN: [id number]    Unit/Bed#: E2 -01 Encounter: [id number]    Primary Care Provider: Ferdinand Young MD   Date and time admitted to hospital: 9/30/2019  5:50 PM        * Syncope and collapseresolved as of 10/4/2019  Assessment & Plan  Due to vasovagal syncope related to acute gastroenteritis with nausea/vomiting/diarrhea  CT head negative for stroke  Telemetry with sinus bradycardia but no malignant arrhythmia  Resolved  Activity as tolerated with PT       Gastroenteritis, acuteresolved as of 10/4/2019  Assessment & Plan  Likely viral  Resolved  C  Diff PCR negative  Continue regular diet as tolerated    Closed fracture of fourth metatarsal bone  Assessment & Plan  Left 4th and 5th metatarsal fracture, traumatic  Podiatry evaluation done    Conservative treatment only and  will not need surgery  STR placement and OP podiatry follow up    Sinus bradycardia  Assessment & Plan  · Secondary to advanced age and aged conduction system  · Avoid AV jose alfredo blocking agent    Abnormal CT scan, sinus  Assessment & Plan  · WIth maxilary sinus opacification and small right upper incisor periapical abscess  · Fairly asymptomatic  · OP ENT and OMFS follow ups  · OMFS appointment scheduled by myself on 10/8/19 1030 am    Essential hypertension  Assessment & Plan  Continue amlodipine 5 mg daily with holds    Gastroesophageal reflux  Assessment & Plan  Stable on Protonix        Discharging Physician / Practitioner: Alesia Garrett MD  PCP: Ferdinand Young MD  Admission Date:   Admission Orders (From admission, onward)     Ordered        09/30/19 1934  Inpatient Admission  Once                   Discharge Date: 10/04/19    Resolved Problems  Date Reviewed: 10/4/2019          Resolved    Leukocytosis 10/2/2019     Resolved by  Alesia Garrett MD    * (Principal) Syncope and collapse 10/4/2019     Resolved by  Alesia Garrett MD    Gastroenteritis, acute 10/4/2019     Resolved by  Alesia Garrett MD          Consultations During Hospital Stay:  · ENT    Procedures Performed:   · none    Significant Findings / Test Results:   · Cdiff negative    Incidental Findings:   · Right maxillary opacification  · Right upper incisor periapical abscess     Test Results Pending at Discharge (will require follow up):   · none     Outpatient Tests Requested:  · OP ENT and OMFS eval    Complications:  none    Reason for Admission:  Syncope    Hospital Course:     Roberto Fan is a 80 y o  female patient who originally presented to the hospital on 9/30/2019 due to syncope and collapse  The patient recalled eating a frozen dinner the night before her admission  The morning after she developed several episodes of emesis and diarrhea  While she was in the bathroom she fainted and fell while sitting on the commode  She was then brought to the hospital for further evaluation and workup  Her syncope and collapse was due to reflex syncope from her nausea, vomiting and diarrhea  CT of the head was negative for stroke  Telemetry was negative for malignant arrhythmia  She was noted to have bruising of the left foot and was found to have mildly displaced fracture of the left 4th and 5th metatarsals  She was evaluated by Podiatry who recommended conservative treatment and no surgery  She was seen by ENT due to incidental finding of open right maxillary opacification and right upper incisor periapical abscess  The patient was asymptomatic from these incidental findings  The patient will follow up with ENT for further evaluation of the maxillary sinus  She has a scheduled follow-up with OMFS on 10/08/2019  Since the patient was asymptomatic without leukocytosis or facial swelling, no antibiotic was started especially in light of her diarrhea  The patient will be discharged to [facility] for [place]-term rehab          Please see above list of diagnoses and related plan for additional information       Condition at Discharge: good Discharge Day Visit / Exam:     Subjective:  No events overnight per nurse  Patient reports feeling better with no nausea vomiting or diarrhea  Denies facial pain or nasal drainage  Vitals: Blood Pressure: 132/74 (10/04/19 0720)  Pulse: 62 (10/04/19 0720)  Temperature: 98 °F (36 7 °C) (10/04/19 0720)  Temp Source: Tympanic (10/03/19 2223)  Respirations: 16 (10/04/19 0720)  Weight - Scale: 50 6 kg (111 lb 8 8 oz) (09/30/19 2150)  SpO2: 97 % (10/04/19 0720)  Exam:   Physical Exam   Constitutional: She is oriented to person, place, and time  She appears well-developed  No distress  HENT:   Head: Normocephalic and atraumatic  Neck: No JVD present  Cardiovascular: Regular rhythm  Exam reveals no gallop and no friction rub  No murmur heard  Pulmonary/Chest: Effort normal  No stridor  No respiratory distress  She has no wheezes  Abdominal: Soft  She exhibits no distension  There is no tenderness  There is no guarding  Musculoskeletal: She exhibits no edema or deformity  Diminished muscle mass  Left foot wrapped clean dry intact   Neurological: She is alert and oriented to person, place, and time  Skin: Skin is warm and dry  She is not diaphoretic  Psychiatric: She has a normal mood and affect  Her behavior is normal        Discharge instructions/Information to patient and family:   See after visit summary for information provided to patient and family  Provisions for Follow-Up Care:  See after visit summary for information related to follow-up care and any pertinent home health orders  Disposition:     Short-term rehab to [facility]      Planned Readmission: none     Discharge Statement:  I spent >30 minutes discharging the patient  This time was spent on the day of discharge  I had direct contact with the patient on the day of discharge   Greater than 50% of the total time was spent examining patient, answering all patient questions, arranging and discussing plan of care with patient as well as directly providing post-discharge instructions  Additional time then spent on discharge activities  Discharge Medications:  See after visit summary for reconciled discharge medications provided to patient and family        ** Please Note: This note has been constructed using a voice recognition system **

## 2019-10-04 NOTE — PHYSICAL THERAPY NOTE
Orthotic Note            Date: 10/4/2019      Patient Name: Reed Saenz            Reason for Consult:  Camwalker for L 4th & 5th metatarsal fx  Recommendations:  Camwalker order received  Pt fitted w/ a size small camwalker  Good fitting achieved  Pt tolerating camwalker well  Pt educated on camwalker application w/ good understanding  Pt require assistance donning/doffing camwalker 2* to pt limited tolerance to bending over to don/doff camwalker  Family arrived towards end of session & educated on purpose of camwalker as well as camwalker application w/ good understanding  Per Podiatry, WBAT w/ camwalker  Will continue PT per POC  RN notified       Gen Fraser, PT

## 2019-10-04 NOTE — SOCIAL WORK
Pt stable for discharge to go to [facility] today     CM arranged a wcv transport with [facility] for 1pm     Numbers for report:  Phone: [phone]  Fax: [phone]

## 2019-10-08 ENCOUNTER — TRANSITIONAL CARE MANAGEMENT (OUTPATIENT)
Dept: FAMILY MEDICINE CLINIC | Facility: CLINIC | Age: 84
End: 2019-10-08

## 2019-10-29 DIAGNOSIS — G45.9 TRANSIENT CEREBRAL ISCHEMIA, UNSPECIFIED TYPE: ICD-10-CM

## 2019-10-29 RX ORDER — ATORVASTATIN CALCIUM 40 MG/1
40 TABLET, FILM COATED ORAL EVERY EVENING
Qty: 90 TABLET | Refills: 3 | Status: SHIPPED | OUTPATIENT
Start: 2019-10-29

## 2020-03-24 ENCOUNTER — TELEPHONE (OUTPATIENT)
Dept: FAMILY MEDICINE CLINIC | Facility: CLINIC | Age: 85
End: 2020-03-24

## 2020-03-31 NOTE — TELEPHONE ENCOUNTER
03/31/20 9:16 AM     Thank you for your request  Your request has been received, reviewed, and the patient chart updated  The PCP has successfully been removed with a patient attribution note  This message will now be completed      Thank you  Shikha Maloney

## 2020-11-02 ENCOUNTER — TELEPHONE (OUTPATIENT)
Dept: FAMILY MEDICINE CLINIC | Facility: CLINIC | Age: 85
End: 2020-11-02

## 2020-11-05 ENCOUNTER — TELEPHONE (OUTPATIENT)
Dept: FAMILY MEDICINE CLINIC | Facility: CLINIC | Age: 85
End: 2020-11-05

## 2022-05-02 ENCOUNTER — HOSPITAL ENCOUNTER (INPATIENT)
Facility: HOSPITAL | Age: 87
LOS: 4 days | Discharge: NURSING, REHAB OR LONG-TERM CARE | DRG: 872 | End: 2022-05-06
Attending: EMERGENCY MEDICINE | Admitting: INTERNAL MEDICINE
Payer: MEDICARE

## 2022-05-02 ENCOUNTER — APPOINTMENT (EMERGENCY)
Dept: CT IMAGING | Facility: HOSPITAL | Age: 87
DRG: 872 | End: 2022-05-02
Payer: MEDICARE

## 2022-05-02 DIAGNOSIS — E87.1 HYPONATREMIA: ICD-10-CM

## 2022-05-02 DIAGNOSIS — S32.059A L5 VERTEBRAL FRACTURE (HCC): ICD-10-CM

## 2022-05-02 DIAGNOSIS — A41.9 SEPSIS (HCC): Primary | ICD-10-CM

## 2022-05-02 DIAGNOSIS — N39.0 ACUTE UTI: ICD-10-CM

## 2022-05-02 DIAGNOSIS — E87.6 ACUTE HYPOKALEMIA: ICD-10-CM

## 2022-05-02 DIAGNOSIS — E87.6 HYPOKALEMIA: ICD-10-CM

## 2022-05-02 DIAGNOSIS — M54.16 LEFT LUMBAR RADICULOPATHY: ICD-10-CM

## 2022-05-02 DIAGNOSIS — R53.1 GENERALIZED WEAKNESS: ICD-10-CM

## 2022-05-02 DIAGNOSIS — M54.50 CHRONIC MIDLINE LOW BACK PAIN WITHOUT SCIATICA: ICD-10-CM

## 2022-05-02 DIAGNOSIS — G89.29 ACUTE EXACERBATION OF CHRONIC LOW BACK PAIN: ICD-10-CM

## 2022-05-02 DIAGNOSIS — E87.1 ACUTE HYPONATREMIA: ICD-10-CM

## 2022-05-02 DIAGNOSIS — G89.29 CHRONIC MIDLINE LOW BACK PAIN WITHOUT SCIATICA: ICD-10-CM

## 2022-05-02 DIAGNOSIS — M54.50 ACUTE EXACERBATION OF CHRONIC LOW BACK PAIN: ICD-10-CM

## 2022-05-02 PROBLEM — I10 ESSENTIAL HYPERTENSION: Status: ACTIVE | Noted: 2019-09-30

## 2022-05-02 LAB
ALBUMIN SERPL BCP-MCNC: 3.4 G/DL (ref 3.5–5)
ALP SERPL-CCNC: 84 U/L (ref 46–116)
ALT SERPL W P-5'-P-CCNC: 20 U/L (ref 12–78)
ANION GAP SERPL CALCULATED.3IONS-SCNC: 14 MMOL/L (ref 4–13)
APTT PPP: 29 SECONDS (ref 23–37)
AST SERPL W P-5'-P-CCNC: 12 U/L (ref 5–45)
ATRIAL RATE: 86 BPM
BACTERIA UR QL AUTO: ABNORMAL /HPF
BASOPHILS # BLD AUTO: 0.03 THOUSANDS/ΜL (ref 0–0.1)
BASOPHILS NFR BLD AUTO: 0 % (ref 0–1)
BILIRUB DIRECT SERPL-MCNC: 0.38 MG/DL (ref 0–0.2)
BILIRUB SERPL-MCNC: 1.09 MG/DL (ref 0.2–1)
BILIRUB UR QL STRIP: ABNORMAL
BUN SERPL-MCNC: 16 MG/DL (ref 5–25)
CALCIUM SERPL-MCNC: 9.6 MG/DL (ref 8.3–10.1)
CHLORIDE SERPL-SCNC: 91 MMOL/L (ref 100–108)
CLARITY UR: CLEAR
CO2 SERPL-SCNC: 25 MMOL/L (ref 21–32)
COLOR UR: YELLOW
CREAT SERPL-MCNC: 0.98 MG/DL (ref 0.6–1.3)
EOSINOPHIL # BLD AUTO: 0 THOUSAND/ΜL (ref 0–0.61)
EOSINOPHIL NFR BLD AUTO: 0 % (ref 0–6)
ERYTHROCYTE [DISTWIDTH] IN BLOOD BY AUTOMATED COUNT: 13 % (ref 11.6–15.1)
GFR SERPL CREATININE-BSD FRML MDRD: 50 ML/MIN/1.73SQ M
GLUCOSE SERPL-MCNC: 334 MG/DL (ref 65–140)
GLUCOSE UR STRIP-MCNC: ABNORMAL MG/DL
HCT VFR BLD AUTO: 44.3 % (ref 34.8–46.1)
HGB BLD-MCNC: 16.3 G/DL (ref 11.5–15.4)
HGB UR QL STRIP.AUTO: NEGATIVE
IMM GRANULOCYTES # BLD AUTO: 0.15 THOUSAND/UL (ref 0–0.2)
IMM GRANULOCYTES NFR BLD AUTO: 1 % (ref 0–2)
INR PPP: 1.1 (ref 0.84–1.19)
KETONES UR STRIP-MCNC: ABNORMAL MG/DL
LACTATE SERPL-SCNC: 1.4 MMOL/L (ref 0.5–2)
LEUKOCYTE ESTERASE UR QL STRIP: ABNORMAL
LYMPHOCYTES # BLD AUTO: 1.48 THOUSANDS/ΜL (ref 0.6–4.47)
LYMPHOCYTES NFR BLD AUTO: 10 % (ref 14–44)
MAGNESIUM SERPL-MCNC: 1.8 MG/DL (ref 1.6–2.6)
MCH RBC QN AUTO: 33.3 PG (ref 26.8–34.3)
MCHC RBC AUTO-ENTMCNC: 36.8 G/DL (ref 31.4–37.4)
MCV RBC AUTO: 90 FL (ref 82–98)
MONOCYTES # BLD AUTO: 1.1 THOUSAND/ΜL (ref 0.17–1.22)
MONOCYTES NFR BLD AUTO: 7 % (ref 4–12)
NEUTROPHILS # BLD AUTO: 12.55 THOUSANDS/ΜL (ref 1.85–7.62)
NEUTS SEG NFR BLD AUTO: 82 % (ref 43–75)
NITRITE UR QL STRIP: NEGATIVE
NON-SQ EPI CELLS URNS QL MICRO: ABNORMAL /HPF
NRBC BLD AUTO-RTO: 0 /100 WBCS
P AXIS: 90 DEGREES
PH UR STRIP.AUTO: 5 [PH] (ref 4.5–8)
PLATELET # BLD AUTO: 322 THOUSANDS/UL (ref 149–390)
PMV BLD AUTO: 9.4 FL (ref 8.9–12.7)
POTASSIUM SERPL-SCNC: 2.9 MMOL/L (ref 3.5–5.3)
PR INTERVAL: 170 MS
PROT SERPL-MCNC: 7.8 G/DL (ref 6.4–8.2)
PROT UR STRIP-MCNC: ABNORMAL MG/DL
PROTHROMBIN TIME: 13.9 SECONDS (ref 11.6–14.5)
QRS AXIS: 26 DEGREES
QRSD INTERVAL: 86 MS
QT INTERVAL: 374 MS
QTC INTERVAL: 447 MS
RBC # BLD AUTO: 4.9 MILLION/UL (ref 3.81–5.12)
RBC #/AREA URNS AUTO: ABNORMAL /HPF
SODIUM SERPL-SCNC: 130 MMOL/L (ref 136–145)
SP GR UR STRIP.AUTO: 1.02 (ref 1–1.03)
T WAVE AXIS: 77 DEGREES
UROBILINOGEN UR QL STRIP.AUTO: 1 E.U./DL
VENTRICULAR RATE: 86 BPM
WBC # BLD AUTO: 15.31 THOUSAND/UL (ref 4.31–10.16)
WBC #/AREA URNS AUTO: ABNORMAL /HPF

## 2022-05-02 PROCEDURE — 87086 URINE CULTURE/COLONY COUNT: CPT

## 2022-05-02 PROCEDURE — 85025 COMPLETE CBC W/AUTO DIFF WBC: CPT | Performed by: EMERGENCY MEDICINE

## 2022-05-02 PROCEDURE — 99285 EMERGENCY DEPT VISIT HI MDM: CPT

## 2022-05-02 PROCEDURE — 80048 BASIC METABOLIC PNL TOTAL CA: CPT | Performed by: EMERGENCY MEDICINE

## 2022-05-02 PROCEDURE — 83605 ASSAY OF LACTIC ACID: CPT | Performed by: EMERGENCY MEDICINE

## 2022-05-02 PROCEDURE — 1124F ACP DISCUSS-NO DSCNMKR DOCD: CPT | Performed by: INTERNAL MEDICINE

## 2022-05-02 PROCEDURE — 93010 ELECTROCARDIOGRAM REPORT: CPT | Performed by: INTERNAL MEDICINE

## 2022-05-02 PROCEDURE — 74177 CT ABD & PELVIS W/CONTRAST: CPT

## 2022-05-02 PROCEDURE — 99291 CRITICAL CARE FIRST HOUR: CPT | Performed by: EMERGENCY MEDICINE

## 2022-05-02 PROCEDURE — 85610 PROTHROMBIN TIME: CPT | Performed by: EMERGENCY MEDICINE

## 2022-05-02 PROCEDURE — 81001 URINALYSIS AUTO W/SCOPE: CPT

## 2022-05-02 PROCEDURE — 93005 ELECTROCARDIOGRAM TRACING: CPT

## 2022-05-02 PROCEDURE — G1004 CDSM NDSC: HCPCS

## 2022-05-02 PROCEDURE — 87040 BLOOD CULTURE FOR BACTERIA: CPT | Performed by: EMERGENCY MEDICINE

## 2022-05-02 PROCEDURE — 99223 1ST HOSP IP/OBS HIGH 75: CPT | Performed by: INTERNAL MEDICINE

## 2022-05-02 PROCEDURE — 96360 HYDRATION IV INFUSION INIT: CPT

## 2022-05-02 PROCEDURE — 85730 THROMBOPLASTIN TIME PARTIAL: CPT | Performed by: EMERGENCY MEDICINE

## 2022-05-02 PROCEDURE — 36415 COLL VENOUS BLD VENIPUNCTURE: CPT | Performed by: EMERGENCY MEDICINE

## 2022-05-02 PROCEDURE — 80076 HEPATIC FUNCTION PANEL: CPT | Performed by: EMERGENCY MEDICINE

## 2022-05-02 PROCEDURE — 96365 THER/PROPH/DIAG IV INF INIT: CPT

## 2022-05-02 PROCEDURE — 83735 ASSAY OF MAGNESIUM: CPT | Performed by: EMERGENCY MEDICINE

## 2022-05-02 RX ORDER — CLOPIDOGREL BISULFATE 75 MG/1
75 TABLET ORAL DAILY
Status: DISCONTINUED | OUTPATIENT
Start: 2022-05-03 | End: 2022-05-06 | Stop reason: HOSPADM

## 2022-05-02 RX ORDER — ATORVASTATIN CALCIUM 40 MG/1
40 TABLET, FILM COATED ORAL EVERY EVENING
Status: DISCONTINUED | OUTPATIENT
Start: 2022-05-02 | End: 2022-05-06 | Stop reason: HOSPADM

## 2022-05-02 RX ORDER — ACETAMINOPHEN 325 MG/1
325 TABLET ORAL EVERY 6 HOURS PRN
Status: DISCONTINUED | OUTPATIENT
Start: 2022-05-02 | End: 2022-05-06 | Stop reason: HOSPADM

## 2022-05-02 RX ORDER — PANTOPRAZOLE SODIUM 20 MG/1
20 TABLET, DELAYED RELEASE ORAL
Status: DISCONTINUED | OUTPATIENT
Start: 2022-05-03 | End: 2022-05-06 | Stop reason: HOSPADM

## 2022-05-02 RX ORDER — ENOXAPARIN SODIUM 100 MG/ML
30 INJECTION SUBCUTANEOUS DAILY
Status: DISCONTINUED | OUTPATIENT
Start: 2022-05-03 | End: 2022-05-06 | Stop reason: HOSPADM

## 2022-05-02 RX ORDER — SODIUM CHLORIDE 9 MG/ML
75 INJECTION, SOLUTION INTRAVENOUS CONTINUOUS
Status: DISCONTINUED | OUTPATIENT
Start: 2022-05-02 | End: 2022-05-03

## 2022-05-02 RX ORDER — SODIUM CHLORIDE 9 MG/ML
125 INJECTION, SOLUTION INTRAVENOUS CONTINUOUS
Status: DISCONTINUED | OUTPATIENT
Start: 2022-05-02 | End: 2022-05-02

## 2022-05-02 RX ORDER — LIDOCAINE 50 MG/G
1 PATCH TOPICAL DAILY
Status: DISCONTINUED | OUTPATIENT
Start: 2022-05-02 | End: 2022-05-06 | Stop reason: HOSPADM

## 2022-05-02 RX ORDER — ASPIRIN 81 MG/1
81 TABLET, CHEWABLE ORAL DAILY
Status: DISCONTINUED | OUTPATIENT
Start: 2022-05-03 | End: 2022-05-06 | Stop reason: HOSPADM

## 2022-05-02 RX ORDER — POTASSIUM CHLORIDE 20 MEQ/1
20 TABLET, EXTENDED RELEASE ORAL 2 TIMES DAILY
Status: DISCONTINUED | OUTPATIENT
Start: 2022-05-02 | End: 2022-05-03

## 2022-05-02 RX ORDER — POTASSIUM CHLORIDE 20 MEQ/1
40 TABLET, EXTENDED RELEASE ORAL ONCE
Status: COMPLETED | OUTPATIENT
Start: 2022-05-02 | End: 2022-05-02

## 2022-05-02 RX ORDER — HYDROCODONE BITARTRATE AND ACETAMINOPHEN 5; 325 MG/1; MG/1
1 TABLET ORAL EVERY 6 HOURS PRN
Status: DISCONTINUED | OUTPATIENT
Start: 2022-05-02 | End: 2022-05-06 | Stop reason: HOSPADM

## 2022-05-02 RX ORDER — AMLODIPINE BESYLATE 5 MG/1
5 TABLET ORAL
Status: DISCONTINUED | OUTPATIENT
Start: 2022-05-02 | End: 2022-05-03

## 2022-05-02 RX ADMIN — LIDOCAINE 1 PATCH: 50 PATCH CUTANEOUS at 20:42

## 2022-05-02 RX ADMIN — POTASSIUM CHLORIDE 40 MEQ: 20 TABLET, EXTENDED RELEASE ORAL at 17:15

## 2022-05-02 RX ADMIN — CEFTRIAXONE SODIUM 2000 MG: 10 INJECTION, POWDER, FOR SOLUTION INTRAVENOUS at 17:17

## 2022-05-02 RX ADMIN — IOHEXOL 100 ML: 350 INJECTION, SOLUTION INTRAVENOUS at 16:24

## 2022-05-02 RX ADMIN — SODIUM CHLORIDE 1000 ML: 0.9 INJECTION, SOLUTION INTRAVENOUS at 17:04

## 2022-05-02 RX ADMIN — SODIUM CHLORIDE 75 ML/HR: 0.9 INJECTION, SOLUTION INTRAVENOUS at 20:42

## 2022-05-02 RX ADMIN — HYDROCODONE BITARTRATE AND ACETAMINOPHEN 1 TABLET: 5; 325 TABLET ORAL at 20:31

## 2022-05-02 RX ADMIN — ATORVASTATIN CALCIUM 40 MG: 40 TABLET, FILM COATED ORAL at 20:31

## 2022-05-02 RX ADMIN — AMLODIPINE BESYLATE 5 MG: 5 TABLET ORAL at 20:32

## 2022-05-02 RX ADMIN — POTASSIUM CHLORIDE 20 MEQ: 1500 TABLET, EXTENDED RELEASE ORAL at 20:31

## 2022-05-02 NOTE — ASSESSMENT & PLAN NOTE
· Met sepsis criteria with leukocytosis and tachycardia from possible urinary tract infection    · Continue ceftriaxone follow-up  · Blood in urine culture results

## 2022-05-02 NOTE — SEPSIS NOTE
Sepsis Note   Jeremiah Barragan 80 y o  female MRN: [id number]  Unit/Bed#: ED 20 Encounter: [id number]       qSOFA     Row Name 05/02/22 1715 05/02/22 1445 05/02/22 1440          Altered mental status GCS < 15 -- -- --      Respiratory Rate > / =22 0 -- 0      Systolic BP < / =118 0 0 0      Q Sofa Score 0 0 0                 Initial Sepsis Screening     Row Name 05/02/22 1744                Is the patient's history suggestive of a new or worsening infection? Yes (Proceed)  initially no concern for infection, found to have UTI w/ two SIRS  -VL        Suspected source of infection --  found to have UTI  -VL        Are two or more of the following signs & symptoms of infection both present and new to the patient? Yes (Proceed)  -VL        Indicate SIRS criteria Tachycardia > 90 bpm;Leukocytosis (WBC > 09580 IJL)  -VL        If the answer is yes to both questions, suspicion of sepsis is present --        If severe sepsis is present AND tissue hypoperfusion perists in the hour after fluid resuscitation or lactate > 4, the patient meets criteria for SEPTIC SHOCK --        Are any of the following organ dysfunction criteria present within 6 hours of suspected infection and SIRS criteria that are NOT considered to be chronic conditions?  No  -VL        Organ dysfunction --        Date of presentation of severe sepsis --        Time of presentation of severe sepsis --        Tissue hypoperfusion persists in the hour after crystalloid fluid administration, evidenced, by either: --        Was hypotension present within one hour of the conclusion of crystalloid fluid administration? --        Date of presentation of septic shock --        Time of presentation of septic shock --              User Key  (r) = Recorded By, (t) = Taken By, (c) = Cosigned By    [address] Name Provider Type    VL Maggie Barragan DO Physician

## 2022-05-02 NOTE — H&P
[address] [facility]  H&P- Randell Dasilva PLOXFO 8/7/1929, 80 y o  female MRN: [id number]  Unit/Bed#: ED 20 Encounter: [id number]  Primary Care Provider: No primary care provider on file  Date and time admitted to hospital: 5/2/2022  2:33 PM    * Sepsis due to urinary tract infection ([address] )  Assessment & Plan  · Met sepsis criteria with leukocytosis and tachycardia from possible urinary tract infection  · Continue ceftriaxone follow-up  · Blood in urine culture results     Chronic midline low back pain without sciatica  Assessment & Plan  Acute on chronic low back pain secondary to lumbar degenerative disc disease and severe bilateral hip arthritis  Continue Tylenol for mild pain  Continue hydrocodone for moderate to severe pain  Add supplemental Lidoderm patch and the topical Voltaren  Consult Orthopedics, PT and OT  Fall precaution    Hyponatremia  Assessment & Plan  · Secondary to HCTZ and advanced age with suspected poor oral intake  · Hold HCTZ hydrate with normal saline 75 mL per per hour  · Repeat BMP in a m  · Avoid correcting to more than 8-10 mg/dL in 24 hours    Hypokalemia  Assessment & Plan  · Secondary to HCTZ and poor oral intake  · Replete with oral potassium  · Repeat BMP in a m  Essential hypertension  Assessment & Plan  · Hold HCTZ  · Continue amlodipine      VTE Prophylaxis: Enoxaparin (Lovenox)  / sequential compression device   Code Status:  DNR  POLST: There is no POLST form on file for this patient (pre-hospital)  Discussion with family:  Spoke with son/POA at the bedside    Anticipated Length of Stay:  Patient will be admitted on an Inpatient basis with an anticipated length of stay of  at least 2 midnights  Justification for Hospital Stay:  UTI, hypokalemia, hyponatremia , back pain    Total Time for Visit, including Counseling / Coordination of Care: 45 minutes  Greater than 50% of this total time spent on direct patient counseling and coordination of care      Chief Complaint:   "They told me I had a urine infection "    History of Present Illness:    Milli Oneal is a 80 y o  female who presents with back pain  The history is obtained from the patient who is a rather poor historian  Supplemental history was obtained from the son, notes by ER physician and review of chart/care everywhere  In brief she is a 25-year-old female with known history of chronic back pain due to degenerative disc disease, who is residing at [facility]  She is usually wheelchair-bound for most of the day  She reports that in the morning she is able to get up and make her bed  She admits that she cannot walk far and does not do much aside from that  She was not able to give me an accurate timeline of her symptoms  She did mention that her pain was worse despite being on her usual medications  She denied any rapid movement, bending forward, or fall  According to her son she may have had I urine sample sent recently and was started on antibiotics at the facility  While in the emergency department she was noted to have significant abnormalities in her electrolytes and blood work  She was found to have hyponatremia, hypokalemia and leukocytosis with abnormal urinalysis  Due to her back pain, abnormalities and suspected UTI she was referred to Internal Medicine for admission  Review of Systems:    Review of Systems   Constitutional: Negative for chills and fever  HENT: Negative  Eyes: Negative  Respiratory: Negative  Cardiovascular: Negative  Gastrointestinal: Negative  Genitourinary: Negative  Musculoskeletal: Positive for back pain and gait problem  Psychiatric/Behavioral: Negative  All other systems reviewed and are negative        Past Medical and Surgical History:     Past Medical History:   Diagnosis Date    Abnormal gait     Chronic renal failure     Diabetes mellitus (HCC)     GERD (gastroesophageal reflux disease)     H  pylori infection     Hypertension     Hypertensive urgency     Pancreatic lesion     Premature atrial beats     Renal disorder     TIA (transient ischemic attack)        Past Surgical History:   Procedure Laterality Date    CHOLECYSTECTOMY         Meds/Allergies:    Prior to Admission medications    Medication Sig Start Date End Date Taking? Authorizing Provider   acetaminophen (TYLENOL) 325 mg tablet Take 325 mg by mouth every 6 (six) hours as needed for mild pain    Historical Provider, MD   amLODIPine (NORVASC) 5 mg tablet Take 1 tablet (5 mg total) by mouth daily after dinner 1/25/19   Bianca Torres MD   aspirin 81 mg chewable tablet Chew 1 tablet (81 mg total) daily 12/14/18   Gerardo Davis DO   atorvastatin (LIPITOR) 40 mg tablet Take 1 tablet (40 mg total) by mouth every evening 10/29/19   Brina Watson MD   bisacodyl (DULCOLAX) 5 mg EC tablet Take 10 mg by mouth daily as needed for constipation    Historical Provider, MD   Cholecalciferol (CVS D3) 1000 units capsule Take 1 capsule (1,000 Units total) by mouth daily 8/8/19   Bianca Torres MD   clopidogrel (PLAVIX) 75 mg tablet Take 1 tablet (75 mg total) by mouth daily 1/25/19   Bianca Torres MD   HYDROcodone-acetaminophen (NORCO) 5-325 mg per tablet Take 1 tablet by mouth every 6 (six) hours as needed for pain    Historical Provider, MD   pantoprazole (PROTONIX) 20 mg tablet Take 1 tablet (20 mg total) by mouth daily 7/22/19   Bianca Torres MD   Petrolatum-Zinc Oxide (PHYTOPLEX Z-GUARD) 57-17 % PSTE Apply topically    Historical Provider, MD   sorbitol 70 % solution Take 15 mL by mouth daily as needed    Historical Provider, MD     I have reviewed home medications with a medical source (PCP, Pharmacy, other)  Allergies: Allergies   Allergen Reactions    Azithromycin Diarrhea    Pioglitazone     Quinapril     Sitagliptin        Social History:     Marital Status:     Occupation:  None  Patient Pre-hospital Living Situation:  Lives at [facility]  Patient Pre-hospital Level of Mobility:  Mostly wheelchair-bound  Patient Pre-hospital Diet Restrictions:  None  Substance Use History:   Social History     Substance and Sexual Activity   Alcohol Use No     Social History     Tobacco Use   Smoking Status Never Smoker   Smokeless Tobacco Never Used     Social History     Substance and Sexual Activity   Drug Use No       Family History:    Family History   Problem Relation Age of Onset    Hypertension Father        Physical Exam:     Vitals:   Blood Pressure: 156/83 (05/02/22 1715)  Pulse: 86 (05/02/22 1715)  Temperature: 98 3 °F (36 8 °C) (05/02/22 1440)  Temp Source: Oral (05/02/22 1440)  Respirations: 16 (05/02/22 1715)  Height: 5' 2 5" (158 8 cm) (05/02/22 1440)  Weight - Scale: 58 2 kg (128 lb 6 4 oz) (05/02/22 1440)  SpO2: 94 % (05/02/22 1445)    Physical Exam  Vitals reviewed  Constitutional:       General: She is not in acute distress  Appearance: She is not ill-appearing, toxic-appearing or diaphoretic  HENT:      Head: Normocephalic and atraumatic  Nose: No rhinorrhea  Eyes:      General: No scleral icterus  Cardiovascular:      Rate and Rhythm: Normal rate and regular rhythm  Heart sounds: No murmur heard  No gallop  Pulmonary:      Effort: Pulmonary effort is normal  No respiratory distress  Breath sounds: No wheezing or rales  Abdominal:      General: Abdomen is flat  There is no distension  Palpations: Abdomen is soft  Tenderness: There is no abdominal tenderness  There is no guarding  Musculoskeletal:      Cervical back: Neck supple  Right lower leg: No edema  Left lower leg: No edema  Comments: Diminished muscle mass  Negative straight leg raise bilaterally   Neurological:      Comments: Oriented to person and place  Moves all extremities  Speech was fluent    Symmetrical strength in both upper lower extremities, 4/5  Unable to perform full hip testing due to pain   Psychiatric:         Mood and Affect: Mood normal  Additional Data:     Lab Results: I have personally reviewed pertinent reports  Results from last 7 days   Lab Units 05/02/22  1526   WBC Thousand/uL 15 31*   HEMOGLOBIN g/dL 16 3*   HEMATOCRIT % 44 3   PLATELETS Thousands/uL 322   NEUTROS PCT % 82*   LYMPHS PCT % 10*   MONOS PCT % 7   EOS PCT % 0     Results from last 7 days   Lab Units 05/02/22  1703 05/02/22  1526   SODIUM mmol/L  --  130*   POTASSIUM mmol/L  --  2 9*   CHLORIDE mmol/L  --  91*   CO2 mmol/L  --  25   BUN mg/dL  --  16   CREATININE mg/dL  --  0 98   ANION GAP mmol/L  --  14*   CALCIUM mg/dL  --  9 6   ALBUMIN g/dL 3 4*  --    TOTAL BILIRUBIN mg/dL 1 09*  --    ALK PHOS U/L 84  --    ALT U/L 20  --    AST U/L 12  --    GLUCOSE RANDOM mg/dL  --  334*     Results from last 7 days   Lab Units 05/02/22  1655   INR  1 10             Results from last 7 days   Lab Units 05/02/22  1703   LACTIC ACID mmol/L 1 4       Imaging: I have personally reviewed pertinent reports  CT abdomen pelvis with contrast   ED Interpretation by Mario Del Real DO (05/02 1730)   See below      Final Result by Ana Luisa Medrano DO (05/02 1705)      No acute inflammatory process identified  Additional non acute findings, as described  Workstation performed: [id number]             EKG, Pathology, and Other Studies Reviewed on Admission:   · EKG:  Sinus rhythm with PVC    Allscripts / Epic Records Reviewed: Yes     ** Please Note: This note has been constructed using a voice recognition system   **

## 2022-05-02 NOTE — ASSESSMENT & PLAN NOTE
· Secondary to HCTZ and advanced age with suspected poor oral intake  · Hold HCTZ hydrate with normal saline 75 mL per per hour  · Repeat BMP in a m    · Avoid correcting to more than 8-10 mg/dL in 24 hours

## 2022-05-02 NOTE — ED PROVIDER NOTES
History  Chief Complaint   Patient presents with    Back Pain     patient reports back pain that radiates into her bilateral hips  Facility reports no falls recently and patient states pain for one week  Patient is primarily in a wheelchair except to use the bathroom, brush teeth when she stands for brief moments  80y F alexandera from NH for worsening of chronic back pain  Stared around [facility] - doesn't recall any fall or inciting event (sneeze/cough, etc)  Since then slowly worsening  W/ the chronic back pain, has had some radiation of the pain into both legs, however for the last three days reports pain so severe - into b/l buttock, upper legs and down to feet (L>R)  Feels like left foot is weaker than the right  Notes parasthesias b/l  Denies b/b incont, no saddle/perineal anesthesia  Denies f/c/s, no unexpected weight loss, no hx of CA (other than bcc), no fever, no instrumentation/procedures involving the low back  Is primarily wheelchair bound, but stands to transfer and can stand at sink to brush teeth and move over the toilet  For the last 3 days hasn't been able to do this due the pain  History provided by:  Patient, nursing home and medical records   used: No    Back Pain  Location:  Lumbar spine  Quality:  Shooting and aching  Radiates to:  L foot, R foot, R thigh, L thigh, R posterior upper leg and L posterior upper leg  Pain severity:  Severe  Pain is:  Same all the time  Onset quality:  Gradual  Timing:  Constant  Progression:  Worsening  Chronicity: acute worsening of chronic pain    Relieved by:  Nothing  Worsened by:  Bending, movement, touching and twisting  Ineffective treatments:  None tried  Associated symptoms: paresthesias and weakness    Associated symptoms: no abdominal pain, no bladder incontinence, no bowel incontinence, no dysuria, no fever, no perianal numbness and no weight loss    Risk factors: hx of osteoporosis and lack of exercise    Risk factors: no hx of cancer and no steroid use        Prior to Admission Medications   Prescriptions Last Dose Informant Patient Reported? Taking?    Cholecalciferol ([employer]S D3) 1000 units capsule  Outside Facility (Specify) No No   Sig: Take 1 capsule (1,000 Units total) by mouth daily   HYDROcodone-acetaminophen (NORCO) 5-325 mg per tablet  Outside Facility (Specify) Yes No   Sig: Take 1 tablet by mouth every 6 (six) hours as needed for pain   Petrolatum-Zinc Oxide (PHYTOPLEX Z-GUARD) 57-17 % PSTE  Outside Facility (Specify) Yes No   Sig: Apply topically   acetaminophen (TYLENOL) 325 mg tablet  Outside Facility (Specify) Yes No   Sig: Take 325 mg by mouth every 6 (six) hours as needed for mild pain   amLODIPine (NORVASC) 5 mg tablet  Outside Facility (Specify) No No   Sig: Take 1 tablet (5 mg total) by mouth daily after dinner   aspirin 81 mg chewable tablet  Outside Facility (Specify) No No   Sig: Chew 1 tablet (81 mg total) daily   atorvastatin (LIPITOR) 40 mg tablet  Outside Facility (Specify) No No   Sig: Take 1 tablet (40 mg total) by mouth every evening   bisacodyl (DULCOLAX) 5 mg EC tablet  Outside Facility (Specify) Yes No   Sig: Take 10 mg by mouth daily as needed for constipation   clopidogrel (PLAVIX) 75 mg tablet  Outside Facility (Specify) No No   Sig: Take 1 tablet (75 mg total) by mouth daily   pantoprazole (PROTONIX) 20 mg tablet  Outside Facility (Specify) No No   Sig: Take 1 tablet (20 mg total) by mouth daily   sorbitol 70 % solution  Outside Facility (Specify) Yes No   Sig: Take 15 mL by mouth daily as needed      Facility-Administered Medications: None       Past Medical History:   Diagnosis Date    Abnormal gait     Chronic renal failure     Diabetes mellitus (HCC)     GERD (gastroesophageal reflux disease)     H  pylori infection     Hypertension     Hypertensive urgency     Pancreatic lesion     Premature atrial beats     Renal disorder     TIA (transient ischemic attack)        Past Surgical History:   Procedure Laterality Date    CHOLECYSTECTOMY         Family History   Problem Relation Age of Onset    Hypertension Father      I have reviewed and agree with the history as documented  E-Cigarette/Vaping     E-Cigarette/Vaping Substances     Social History     Tobacco Use    Smoking status: Never Smoker    Smokeless tobacco: Never Used   Substance Use Topics    Alcohol use: No    Drug use: No       Review of Systems   Constitutional: Negative for fever and weight loss  Gastrointestinal: Negative for abdominal pain and bowel incontinence  Genitourinary: Negative for bladder incontinence and dysuria  Musculoskeletal: Positive for back pain  Neurological: Positive for weakness and paresthesias  All other systems reviewed and are negative  Physical Exam  Physical Exam  Vitals and nursing note reviewed  Constitutional:       General: She is not in acute distress  Appearance: She is normal weight  She is not ill-appearing, toxic-appearing or diaphoretic  Comments: Elderly and frail appearing     HENT:      Mouth/Throat:      Mouth: Mucous membranes are moist    Eyes:      Conjunctiva/sclera: Conjunctivae normal    Cardiovascular:      Rate and Rhythm: Normal rate and regular rhythm  Pulmonary:      Effort: Pulmonary effort is normal       Breath sounds: Normal breath sounds  Abdominal:      General: Bowel sounds are normal       Palpations: Abdomen is soft  Tenderness: There is abdominal tenderness in the suprapubic area and left lower quadrant  There is no guarding or rebound  Negative signs include Rovsing's sign and McBurney's sign  Musculoskeletal:      Cervical back: No tenderness  Right lower leg: Edema (trace) present  Left lower leg: Edema (trace) present  Skin:     General: Skin is warm  Capillary Refill: Capillary refill takes less than 2 seconds  Neurological:      Mental Status: She is alert and oriented to person, place, and time  GCS: GCS eye subscore is 4  GCS verbal subscore is 5  GCS motor subscore is 6  Cranial Nerves: Cranial nerves are intact  Sensory: Sensation is intact        Comments: Left foot - 4/5 dorsiflexion,    Flexion at hip b/l 4/5 but seems to be due to pain and not true weakness   Psychiatric:         Mood and Affect: Mood normal          Vital Signs  ED Triage Vitals   Temperature Pulse Respirations Blood Pressure SpO2   05/02/22 1440 05/02/22 1440 05/02/22 1440 05/02/22 1440 05/02/22 1440   98 3 °F (36 8 °C) (!) 109 18 161/79 94 %      Temp Source Heart Rate Source Patient Position - Orthostatic VS BP Location FiO2 (%)   05/02/22 1440 05/02/22 1440 05/02/22 1440 05/02/22 1440 --   Oral Monitor Sitting Left arm       Pain Score       05/02/22 2031       5           Vitals:    05/02/22 1830 05/02/22 1952 05/02/22 2257 05/03/22 0724   BP: 168/79 138/76 136/72 140/70   Pulse: 96 86 85 82   Patient Position - Orthostatic VS:    Lying         Visual Acuity      ED Medications  Medications   acetaminophen (TYLENOL) tablet 325 mg (325 mg Oral Refused 5/3/22 0524)   amLODIPine (NORVASC) tablet 5 mg (5 mg Oral Given 5/2/22 2032)   aspirin chewable tablet 81 mg (81 mg Oral Given 5/3/22 0836)   atorvastatin (LIPITOR) tablet 40 mg (40 mg Oral Given 5/2/22 2031)   bisacodyl (DULCOLAX) EC tablet 10 mg (has no administration in time range)   clopidogrel (PLAVIX) tablet 75 mg (75 mg Oral Given 5/3/22 0836)   HYDROcodone-acetaminophen (NORCO) 5-325 mg per tablet 1 tablet (1 tablet Oral Given 5/2/22 2031)   pantoprazole (PROTONIX) EC tablet 20 mg ( Oral Canceled Entry 5/3/22 0615)   enoxaparin (LOVENOX) subcutaneous injection 30 mg (30 mg Subcutaneous Given 5/3/22 0836)   lidocaine (LIDODERM) 5 % patch 1 patch (1 patch Topical Medication Applied 5/3/22 0836)   Diclofenac Sodium (VOLTAREN) 1 % topical gel 2 g (2 g Topical Given 5/3/22 9779)   ceftriaxone (ROCEPHIN) 1 g/50 mL in dextrose IVPB (has no administration in time range) potassium chloride (K-DUR,KLOR-CON) CR tablet 20 mEq (20 mEq Oral Given 5/3/22 0836)   sodium chloride 0 9 % infusion (75 mL/hr Intravenous New Bag 5/3/22 0932)   iohexol (OMNIPAQUE) 350 MG/ML injection (SINGLE-DOSE) 100 mL (100 mL Intravenous Given 5/2/22 1624)   potassium chloride (K-DUR,KLOR-CON) CR tablet 40 mEq (40 mEq Oral Given 5/2/22 1715)   ceftriaxone (ROCEPHIN) 2 g/50 mL in dextrose IVPB (2,000 mg Intravenous New Bag 5/2/22 1717)   sodium chloride 0 9 % bolus 1,000 mL (1,000 mL Intravenous New Bag 5/2/22 1704)       Diagnostic Studies  Results Reviewed     Procedure Component Value Units Date/Time    CBC and differential [[id number]] Collected: 05/03/22 0524    Lab Status: Final result Specimen: Blood from Arm, Left Updated: 05/03/22 0800     WBC 9 79 Thousand/uL      RBC 3 90 Million/uL      Hemoglobin 12 3 g/dL      Hematocrit 37 2 %      MCV 95 fL      MCH 31 5 pg      MCHC 33 1 g/dL      RDW 13 2 %      MPV 9 4 fL      Platelets 553 Thousands/uL      nRBC 0 /100 WBCs      Neutrophils Relative 72 %      Immat GRANS % 1 %      Lymphocytes Relative 18 %      Monocytes Relative 9 %      Eosinophils Relative 0 %      Basophils Relative 0 %      Neutrophils Absolute 7 04 Thousands/µL      Immature Grans Absolute 0 05 Thousand/uL      Lymphocytes Absolute 1 78 Thousands/µL      Monocytes Absolute 0 86 Thousand/µL      Eosinophils Absolute 0 03 Thousand/µL      Basophils Absolute 0 03 Thousands/µL     Basic metabolic panel [[id number]]  (Abnormal) Collected: 05/03/22 0524    Lab Status: Final result Specimen: Blood from Arm, Left Updated: 05/03/22 5149     Sodium 140 mmol/L      Potassium 2 8 mmol/L      Chloride 108 mmol/L      CO2 22 mmol/L      ANION GAP 10 mmol/L      BUN 9 mg/dL      Creatinine 0 63 mg/dL      Glucose 179 mg/dL      Calcium 6 8 mg/dL      eGFR 78 ml/min/1 73sq m     Narrative:      [place] guidelines for Chronic Kidney Disease (CKD):     Stage 1 with normal or high GFR (GFR > 90 mL/min/1 73 square meters)    Stage 2 Mild CKD (GFR = 60-89 mL/min/1 73 square meters)    Stage 3A Moderate CKD (GFR = 45-59 mL/min/1 73 square meters)    Stage 3B Moderate CKD (GFR = 30-44 mL/min/1 73 square meters)    Stage 4 Severe CKD (GFR = 15-29 mL/min/1 73 square meters)    Stage 5 End Stage CKD (GFR <15 mL/min/1 73 square meters)  Note: GFR calculation is accurate only with a steady state creatinine    Blood culture #1 [[id number]] Collected: 05/02/22 1703    Lab Status: Preliminary result Specimen: Blood from Arm, Left Updated: 05/03/22 0001     Blood Culture Received in Microbiology Lab  Culture in Progress  Blood culture #2 [[id number]] Collected: 05/02/22 1703    Lab Status: Preliminary result Specimen: Blood from Arm, Right Updated: 05/03/22 0001     Blood Culture Received in Microbiology Lab  Culture in Progress  Platelet count [[id number]]     Lab Status: No result Specimen: Blood     Magnesium [[id number]]  (Normal) Collected: 05/02/22 1703    Lab Status: Final result Specimen: Blood from Arm, Right Updated: 05/02/22 1733     Magnesium 1 8 mg/dL     Hepatic function panel [[id number]]  (Abnormal) Collected: 05/02/22 1703    Lab Status: Final result Specimen: Blood from Arm, Right Updated: 05/02/22 1733     Total Bilirubin 1 09 mg/dL      Bilirubin, Direct 0 38 mg/dL      Alkaline Phosphatase 84 U/L      AST 12 U/L      ALT 20 U/L      Total Protein 7 8 g/dL      Albumin 3 4 g/dL     Lactic acid [[id number]]  (Normal) Collected: 05/02/22 1703    Lab Status: Final result Specimen: Blood from Arm, Right Updated: 05/02/22 1727     LACTIC ACID 1 4 mmol/L     Narrative:      Result may be elevated if tourniquet was used during collection      Protime-INR [[id number]]  (Normal) Collected: 05/02/22 1655    Lab Status: Final result Specimen: Blood Updated: 05/02/22 1716     Protime 13 9 seconds      INR 1 10    APTT [[id number]]  (Normal) Collected: 05/02/22 1655    Lab Status: Final result Specimen: Blood Updated: 05/02/22 1716     PTT 29 seconds     Urine Microscopic [[id number]]  (Abnormal) Collected: 05/02/22 1538    Lab Status: Final result Specimen: Urine, Clean Catch Updated: 05/02/22 1632     RBC, UA 0-1 /hpf      WBC, UA 10-20 /hpf      Epithelial Cells Occasional /hpf      Bacteria, UA Innumerable /hpf     Urine culture [[id number]] Collected: 05/02/22 1538    Lab Status:  In process Specimen: Urine, Clean Catch Updated: 05/02/22 1110    Basic metabolic panel [[id number]]  (Abnormal) Collected: 05/02/22 1526    Lab Status: Final result Specimen: Blood from Arm, Left Updated: 05/02/22 1614     Sodium 130 mmol/L      Potassium 2 9 mmol/L      Chloride 91 mmol/L      CO2 25 mmol/L      ANION GAP 14 mmol/L      BUN 16 mg/dL      Creatinine 0 98 mg/dL      Glucose 334 mg/dL      Calcium 9 6 mg/dL      eGFR 50 ml/min/1 73sq m     Narrative:      National Kid[employer] guidelines for Chronic Kidney Disease (CKD):     Stage 1 with normal or high GFR (GFR > 90 mL/min/1 73 square meters)    Stage 2 Mild CKD (GFR = 60-89 mL/min/1 73 square meters)    Stage 3A Moderate CKD (GFR = 45-59 mL/min/1 73 square meters)    Stage 3B Moderate CKD (GFR = 30-44 mL/min/1 73 square meters)    Stage 4 Severe CKD (GFR = 15-29 mL/min/1 73 square meters)    Stage 5 End Stage CKD (GFR <15 mL/min/1 73 square meters)  Note: GFR calculation is accurate only with a steady state creatinine    CBC and differential [[id number]]  (Abnormal) Collected: 05/02/22 1526    Lab Status: Final result Specimen: Blood from Arm, Left Updated: 05/02/22 1540     WBC 15 31 Thousand/uL      RBC 4 90 Million/uL      Hemoglobin 16 3 g/dL      Hematocrit 44 3 %      MCV 90 fL      MCH 33 3 pg      MCHC 36 8 g/dL      RDW 13 0 %      MPV 9 4 fL      Platelets 343 Thousands/uL      nRBC 0 /100 WBCs      Neutrophils Relative 82 %      Immat GRANS % 1 %      Lymphocytes Relative 10 %      Monocytes Relative 7 %      Eosinophils Relative 0 %      Basophils Relative 0 %      Neutrophils Absolute 12 55 Thousands/µL      Immature Grans Absolute 0 15 Thousand/uL      Lymphocytes Absolute 1 48 Thousands/µL      Monocytes Absolute 1 10 Thousand/µL      Eosinophils Absolute 0 00 Thousand/µL      Basophils Absolute 0 03 Thousands/µL     Urine Macroscopic, POC [[id number]]  (Abnormal) Collected: 05/02/22 1538    Lab Status: Final result Specimen: Urine Updated: 05/02/22 1539     Color, UA Yellow     Clarity, UA Clear     pH, UA 5 0     Leukocytes, UA Elevated glucose may cause decreased leukocyte values  See urine microscopic for [facility] result/     Nitrite, UA Negative     Protein,  (2+) mg/dl      Glucose, UA >=1000 (1%) mg/dl      Ketones, UA 15 (1+) mg/dl      Urobilinogen, UA 1 0 E U /dl      Bilirubin, UA Interference- unable to analyze     Blood, UA Negative     Specific Gravity, UA 1 025    Narrative:      CLINITEK RESULT                 CT abdomen pelvis with contrast   ED Interpretation by Edvin Gautam DO (05/02 1730)   See below      Final Result by Stephanie Cox DO (05/02 1705)      No acute inflammatory process identified  Additional non acute findings, as described                Workstation performed: [id number]                    Procedures  ECG 12 Lead Documentation Only    Date/Time: 5/3/2022 5:23 PM  Performed by: Edvin Gautam DO  Authorized by: Edvin Gautam DO     Indications / Diagnosis:  Hypokalemia  ECG reviewed by me, the ED Provider: yes    Patient location:  ED  Previous ECG:     Previous ECG:  Compared to current    Similarity:  Changes noted  Interpretation:     Interpretation: non-specific    Rate:     ECG rate:  86    ECG rate assessment: normal    Rhythm:     Rhythm: sinus rhythm    Ectopy:     Ectopy: none    QRS:     QRS axis:  Normal  ST segments:     ST segments:  Non-specific  T waves:     T waves: non-specific    CriticalCare Time  Performed by: Edvin Gautam DO  Authorized by: Morgan Parra DO Jen     Critical care provider statement:     Critical care time (minutes):  35    Critical care time was exclusive of:  Separately billable procedures and treating other patients and teaching time    Critical care was necessary to treat or prevent imminent or life-threatening deterioration of the following conditions:  Sepsis, trauma and metabolic crisis    Critical care was time spent personally by me on the following activities:  Blood draw for specimens, obtaining history from patient or surrogate, development of treatment plan with patient or surrogate, evaluation of patient's response to treatment, examination of patient, review of old charts, re-evaluation of patient's condition, ordering and review of radiographic studies, ordering and review of laboratory studies and ordering and performing treatments and interventions    I assumed direction of critical care for this patient from another provider in my specialty: no               ED Course  ED Course as of 05/03/22 1025   Mon May 02, 2022   1636 Bacteria, UA(!): Innumerable  Does have elevated WBC  Will add sepsis labs, treat UTI and await remaining results  1638 Potassium(!): 2 9   1638 Sodium(!): 130   1638 Sodium/Potassium low - normal on previous labs  Will order fluids, po potassium  EKG ordered  Still awaiting CT results  Will need admission for continued K repletion  1719 Creatinine: 0 98  0 78 two years ago   1730 Will admit to [facility] for hypokalemia, hyponatremia, UTI, worsening back pain/leg weakness  1744 Magnesium: 1 8                            Initial Sepsis Screening     Row Name 05/02/22 1744                Is the patient's history suggestive of a new or worsening infection?  Yes (Proceed)  initially no concern for infection, found to have UTI w/ two SIRS  -VL        Suspected source of infection --  found to have UTI  -VL        Are two or more of the following signs & symptoms of infection both present and new to the patient? Yes (Proceed)  -VL        Indicate SIRS criteria Tachycardia > 90 bpm;Leukocytosis (WBC > 34792 IJL)  -VL        If the answer is yes to both questions, suspicion of sepsis is present --        If severe sepsis is present AND tissue hypoperfusion perists in the hour after fluid resuscitation or lactate > 4, the patient meets criteria for SEPTIC SHOCK --        Are any of the following organ dysfunction criteria present within 6 hours of suspected infection and SIRS criteria that are NOT considered to be chronic conditions? No  -VL        Organ dysfunction --        Date of presentation of severe sepsis --        Time of presentation of severe sepsis --        Tissue hypoperfusion persists in the hour after crystalloid fluid administration, evidenced, by either: --        Was hypotension present within one hour of the conclusion of crystalloid fluid administration? --        Date of presentation of septic shock --        Time of presentation of septic shock --              User Key  (r) = Recorded By, (t) = Taken By, (c) = Cosigned By    [address] Name Provider Type    VL Baldo Walsh,  Physician                SBIRT 20yo+      Most Recent Value   SBIRT (23 yo +)    In order to provide better care to our patients, we are screening all of our patients for alcohol and drug use  Would it be okay to ask you these screening questions? Unable to answer at this time Filed at: 05/02/2022 1504                    MDM  Number of Diagnoses or Management Options  Acute exacerbation of chronic low back pain: new and requires workup  Acute hypokalemia: new and requires workup  Acute hyponatremia: new and requires workup  Acute UTI: new and requires workup  Generalized weakness: new and requires workup  Left lumbar radiculopathy: new and requires workup  Sepsis [facility]): new and requires workup  Diagnosis management comments:  Worsening of acute back pain, now w/ some evidence of radiculopathy - concerning for left L5>L4 nerve involvement (weak dorsiflexion of left foot, most notably left toe)  Will ck labs, ua, CT a/p given mild lower abd ttp  Should be able to eval lumbar spine from CT    Will likely need admission       Amount and/or Complexity of Data Reviewed  Clinical lab tests: reviewed  Tests in the radiology section of CPT®: reviewed  Tests in the medicine section of CPT®: reviewed  Decide to obtain previous medical records or to obtain history from someone other than the patient: yes  Independent visualization of images, tracings, or specimens: yes        Disposition  Final diagnoses:   Acute hypokalemia   Acute hyponatremia   Acute UTI   Sepsis ([place] [address] ) - does not meet Severe Sepsis criteria on admission   Acute exacerbation of chronic low back pain   Left lumbar radiculopathy   Generalized weakness     Time reflects when diagnosis was documented in both MDM as applicable and the Disposition within this note     Time User Action Codes Description Comment    5/2/2022  5:47 PM Rubina Li Add [E87 6] Acute hypokalemia     5/2/2022  5:47 PM Jen,  [address] [E87 1] Acute hyponatremia     5/2/2022  5:47 PM Rubina Li Add [N39 0] Acute UTI     5/2/2022  5:47 PM Jen, [address] [A41 9] Sepsis ([place] [address] )     5/2/2022  5:48 PM Jessica Li [E87 6] Acute hypokalemia     5/2/2022  5:48 PM Jessica Li [A41 9] Sepsis ([place] [address] )     5/2/2022  5:48 PM Rubina Li Modify [A41 9] Sepsis ([place] [address] ) does not meet Severe Sepsis criteria on admission    5/2/2022  5:48 PM Rubina Li Add [M54 50,  G89 29] Acute exacerbation of chronic low back pain     5/2/2022  5:48 PM Ben Hernandez Add [M54 16] Left lumbar radiculopathy     5/2/2022  5:49 PM Rubina Li Add [R53 1] Generalized weakness     5/2/2022  6:26 PM Alvarado Hernandez Add [M54 50,  G89 29] Chronic midline low back pain without sciatica       ED Disposition     ED Disposition Condition Date/Time Comment    Admit Stable Mon May 2, 2022 5:46 PM Case was discussed with Dr Jesi Carolina and the patient's admission status was agreed to be Admission Status: inpatient status to the service of Dr Jesi Carolina   Follow-up Information    None         Current Discharge Medication List      CONTINUE these medications which have NOT CHANGED    Details   acetaminophen (TYLENOL) 325 mg tablet Take 325 mg by mouth every 6 (six) hours as needed for mild pain      amLODIPine (NORVASC) 5 mg tablet Take 1 tablet (5 mg total) by mouth daily after dinner  Qty: 90 tablet, Refills: 3    Comments: Hold for SBP<110 mmHg  Associated Diagnoses: Hypertensive urgency      aspirin 81 mg chewable tablet Chew 1 tablet (81 mg total) daily  Refills: 0    Associated Diagnoses: Transient cerebral ischemia, unspecified type      atorvastatin (LIPITOR) 40 mg tablet Take 1 tablet (40 mg total) by mouth every evening  Qty: 90 tablet, Refills: 3    Associated Diagnoses: Transient cerebral ischemia, unspecified type      bisacodyl (DULCOLAX) 5 mg EC tablet Take 10 mg by mouth daily as needed for constipation      Cholecalciferol ([employer]S D3) 1000 units capsule Take 1 capsule (1,000 Units total) by mouth daily  Qty: 30 capsule, Refills: 2    Associated Diagnoses: Low vitamin D level      clopidogrel (PLAVIX) 75 mg tablet Take 1 tablet (75 mg total) by mouth daily  Qty: 90 tablet, Refills: 3    Associated Diagnoses: Transient cerebral ischemia, unspecified type      HYDROcodone-acetaminophen (NORCO) 5-325 mg per tablet Take 1 tablet by mouth every 6 (six) hours as needed for pain      pantoprazole (PROTONIX) 20 mg tablet Take 1 tablet (20 mg total) by mouth daily  Qty: 90 tablet, Refills: 1    Associated Diagnoses: Gastroesophageal reflux disease, esophagitis presence not specified      Petrolatum-Zinc Oxide (PHYTOPLEX Z-GUARD) 57-17 % PSTE Apply topically      sorbitol 70 % solution Take 15 mL by mouth daily as needed             No discharge procedures on file      PDMP Review     None          ED Provider  Electronically Signed by           Rebecca Osman,   05/03/22 655 ERR[address] [place],   05/03/22 1026

## 2022-05-02 NOTE — ASSESSMENT & PLAN NOTE
Acute on chronic low back pain secondary to lumbar degenerative disc disease and severe bilateral hip arthritis  Continue Tylenol for mild pain  Continue hydrocodone for moderate to severe pain  Add supplemental Lidoderm patch and the topical Voltaren  Consult Orthopedics, PT and OT  Fall precaution

## 2022-05-03 ENCOUNTER — APPOINTMENT (INPATIENT)
Dept: MRI IMAGING | Facility: HOSPITAL | Age: 87
DRG: 872 | End: 2022-05-03
Payer: MEDICARE

## 2022-05-03 LAB
25(OH)D3 SERPL-MCNC: 32.1 NG/ML (ref 30–100)
ANION GAP SERPL CALCULATED.3IONS-SCNC: 10 MMOL/L (ref 4–13)
ANION GAP SERPL CALCULATED.3IONS-SCNC: 12 MMOL/L (ref 4–13)
BACTERIA UR CULT: NORMAL
BASOPHILS # BLD AUTO: 0.03 THOUSANDS/ΜL (ref 0–0.1)
BASOPHILS NFR BLD AUTO: 0 % (ref 0–1)
BUN SERPL-MCNC: 8 MG/DL (ref 5–25)
BUN SERPL-MCNC: 9 MG/DL (ref 5–25)
CALCIUM SERPL-MCNC: 6.8 MG/DL (ref 8.3–10.1)
CALCIUM SERPL-MCNC: 9.3 MG/DL (ref 8.3–10.1)
CHLORIDE SERPL-SCNC: 101 MMOL/L (ref 100–108)
CHLORIDE SERPL-SCNC: 108 MMOL/L (ref 100–108)
CO2 SERPL-SCNC: 22 MMOL/L (ref 21–32)
CO2 SERPL-SCNC: 24 MMOL/L (ref 21–32)
CREAT SERPL-MCNC: 0.55 MG/DL (ref 0.6–1.3)
CREAT SERPL-MCNC: 0.63 MG/DL (ref 0.6–1.3)
EOSINOPHIL # BLD AUTO: 0.03 THOUSAND/ΜL (ref 0–0.61)
EOSINOPHIL NFR BLD AUTO: 0 % (ref 0–6)
ERYTHROCYTE [DISTWIDTH] IN BLOOD BY AUTOMATED COUNT: 13.2 % (ref 11.6–15.1)
GFR SERPL CREATININE-BSD FRML MDRD: 78 ML/MIN/1.73SQ M
GFR SERPL CREATININE-BSD FRML MDRD: 81 ML/MIN/1.73SQ M
GLUCOSE SERPL-MCNC: 179 MG/DL (ref 65–140)
GLUCOSE SERPL-MCNC: 208 MG/DL (ref 65–140)
GLUCOSE SERPL-MCNC: 218 MG/DL (ref 65–140)
GLUCOSE SERPL-MCNC: 220 MG/DL (ref 65–140)
HCT VFR BLD AUTO: 37.2 % (ref 34.8–46.1)
HGB BLD-MCNC: 12.3 G/DL (ref 11.5–15.4)
IMM GRANULOCYTES # BLD AUTO: 0.05 THOUSAND/UL (ref 0–0.2)
IMM GRANULOCYTES NFR BLD AUTO: 1 % (ref 0–2)
LYMPHOCYTES # BLD AUTO: 1.78 THOUSANDS/ΜL (ref 0.6–4.47)
LYMPHOCYTES NFR BLD AUTO: 18 % (ref 14–44)
MCH RBC QN AUTO: 31.5 PG (ref 26.8–34.3)
MCHC RBC AUTO-ENTMCNC: 33.1 G/DL (ref 31.4–37.4)
MCV RBC AUTO: 95 FL (ref 82–98)
MONOCYTES # BLD AUTO: 0.86 THOUSAND/ΜL (ref 0.17–1.22)
MONOCYTES NFR BLD AUTO: 9 % (ref 4–12)
NEUTROPHILS # BLD AUTO: 7.04 THOUSANDS/ΜL (ref 1.85–7.62)
NEUTS SEG NFR BLD AUTO: 72 % (ref 43–75)
NRBC BLD AUTO-RTO: 0 /100 WBCS
PLATELET # BLD AUTO: 238 THOUSANDS/UL (ref 149–390)
PMV BLD AUTO: 9.4 FL (ref 8.9–12.7)
POTASSIUM SERPL-SCNC: 2.8 MMOL/L (ref 3.5–5.3)
POTASSIUM SERPL-SCNC: 4 MMOL/L (ref 3.5–5.3)
PTH-INTACT SERPL-MCNC: 59.7 PG/ML (ref 18.4–80.1)
RBC # BLD AUTO: 3.9 MILLION/UL (ref 3.81–5.12)
SODIUM SERPL-SCNC: 137 MMOL/L (ref 136–145)
SODIUM SERPL-SCNC: 140 MMOL/L (ref 136–145)
WBC # BLD AUTO: 9.79 THOUSAND/UL (ref 4.31–10.16)

## 2022-05-03 PROCEDURE — 85025 COMPLETE CBC W/AUTO DIFF WBC: CPT | Performed by: INTERNAL MEDICINE

## 2022-05-03 PROCEDURE — 99232 SBSQ HOSP IP/OBS MODERATE 35: CPT | Performed by: PHYSICIAN ASSISTANT

## 2022-05-03 PROCEDURE — 83970 ASSAY OF PARATHORMONE: CPT | Performed by: INTERNAL MEDICINE

## 2022-05-03 PROCEDURE — 97167 OT EVAL HIGH COMPLEX 60 MIN: CPT

## 2022-05-03 PROCEDURE — 80048 BASIC METABOLIC PNL TOTAL CA: CPT | Performed by: INTERNAL MEDICINE

## 2022-05-03 PROCEDURE — 72148 MRI LUMBAR SPINE W/O DYE: CPT

## 2022-05-03 PROCEDURE — 99222 1ST HOSP IP/OBS MODERATE 55: CPT | Performed by: INTERNAL MEDICINE

## 2022-05-03 PROCEDURE — 97163 PT EVAL HIGH COMPLEX 45 MIN: CPT

## 2022-05-03 PROCEDURE — G1004 CDSM NDSC: HCPCS

## 2022-05-03 PROCEDURE — 82306 VITAMIN D 25 HYDROXY: CPT | Performed by: INTERNAL MEDICINE

## 2022-05-03 PROCEDURE — 82948 REAGENT STRIP/BLOOD GLUCOSE: CPT

## 2022-05-03 PROCEDURE — 99223 1ST HOSP IP/OBS HIGH 75: CPT | Performed by: PHYSICIAN ASSISTANT

## 2022-05-03 RX ORDER — MAGNESIUM SULFATE HEPTAHYDRATE 40 MG/ML
2 INJECTION, SOLUTION INTRAVENOUS ONCE
Status: COMPLETED | OUTPATIENT
Start: 2022-05-03 | End: 2022-05-03

## 2022-05-03 RX ORDER — SODIUM CHLORIDE, SODIUM GLUCONATE, SODIUM ACETATE, POTASSIUM CHLORIDE, MAGNESIUM CHLORIDE, SODIUM PHOSPHATE, DIBASIC, AND POTASSIUM PHOSPHATE .53; .5; .37; .037; .03; .012; .00082 G/100ML; G/100ML; G/100ML; G/100ML; G/100ML; G/100ML; G/100ML
50 INJECTION, SOLUTION INTRAVENOUS CONTINUOUS
Status: DISCONTINUED | OUTPATIENT
Start: 2022-05-03 | End: 2022-05-03

## 2022-05-03 RX ORDER — INSULIN LISPRO 100 [IU]/ML
1-5 INJECTION, SOLUTION INTRAVENOUS; SUBCUTANEOUS
Status: DISCONTINUED | OUTPATIENT
Start: 2022-05-03 | End: 2022-05-06 | Stop reason: HOSPADM

## 2022-05-03 RX ORDER — POTASSIUM CHLORIDE 20 MEQ/1
40 TABLET, EXTENDED RELEASE ORAL ONCE
Status: COMPLETED | OUTPATIENT
Start: 2022-05-03 | End: 2022-05-03

## 2022-05-03 RX ORDER — AMLODIPINE BESYLATE 5 MG/1
5 TABLET ORAL
Status: DISCONTINUED | OUTPATIENT
Start: 2022-05-03 | End: 2022-05-06 | Stop reason: HOSPADM

## 2022-05-03 RX ORDER — CALCIUM CARBONATE 200(500)MG
1000 TABLET,CHEWABLE ORAL ONCE
Status: COMPLETED | OUTPATIENT
Start: 2022-05-03 | End: 2022-05-03

## 2022-05-03 RX ORDER — CALCIUM GLUCONATE 20 MG/ML
1 INJECTION, SOLUTION INTRAVENOUS ONCE
Status: COMPLETED | OUTPATIENT
Start: 2022-05-03 | End: 2022-05-03

## 2022-05-03 RX ADMIN — ASPIRIN 81 MG CHEWABLE TABLET 81 MG: 81 TABLET CHEWABLE at 08:36

## 2022-05-03 RX ADMIN — CALCIUM CARBONATE (ANTACID) CHEW TAB 500 MG 1000 MG: 500 CHEW TAB at 16:19

## 2022-05-03 RX ADMIN — ENOXAPARIN SODIUM 30 MG: 30 INJECTION, SOLUTION INTRAVENOUS; SUBCUTANEOUS at 08:36

## 2022-05-03 RX ADMIN — ATORVASTATIN CALCIUM 40 MG: 40 TABLET, FILM COATED ORAL at 17:22

## 2022-05-03 RX ADMIN — CALCIUM GLUCONATE 1 G: 20 INJECTION, SOLUTION INTRAVENOUS at 15:58

## 2022-05-03 RX ADMIN — DICLOFENAC SODIUM 2 G: 10 GEL TOPICAL at 17:22

## 2022-05-03 RX ADMIN — INSULIN LISPRO 1 UNITS: 100 INJECTION, SOLUTION INTRAVENOUS; SUBCUTANEOUS at 16:19

## 2022-05-03 RX ADMIN — DICLOFENAC SODIUM 2 G: 10 GEL TOPICAL at 20:45

## 2022-05-03 RX ADMIN — PANTOPRAZOLE SODIUM 20 MG: 20 TABLET, DELAYED RELEASE ORAL at 05:13

## 2022-05-03 RX ADMIN — INSULIN LISPRO 1 UNITS: 100 INJECTION, SOLUTION INTRAVENOUS; SUBCUTANEOUS at 21:45

## 2022-05-03 RX ADMIN — POTASSIUM CHLORIDE 40 MEQ: 1500 TABLET, EXTENDED RELEASE ORAL at 16:19

## 2022-05-03 RX ADMIN — CEFTRIAXONE SODIUM 1000 MG: 10 INJECTION, POWDER, FOR SOLUTION INTRAVENOUS at 17:21

## 2022-05-03 RX ADMIN — POTASSIUM CHLORIDE 20 MEQ: 1500 TABLET, EXTENDED RELEASE ORAL at 08:36

## 2022-05-03 RX ADMIN — POTASSIUM CHLORIDE 40 MEQ: 1500 TABLET, EXTENDED RELEASE ORAL at 11:52

## 2022-05-03 RX ADMIN — MAGNESIUM SULFATE HEPTAHYDRATE 2 G: 40 INJECTION, SOLUTION INTRAVENOUS at 13:02

## 2022-05-03 RX ADMIN — DICLOFENAC SODIUM 2 G: 10 GEL TOPICAL at 11:00

## 2022-05-03 RX ADMIN — AMLODIPINE BESYLATE 5 MG: 5 TABLET ORAL at 17:22

## 2022-05-03 RX ADMIN — HYDROCODONE BITARTRATE AND ACETAMINOPHEN 1 TABLET: 5; 325 TABLET ORAL at 20:31

## 2022-05-03 RX ADMIN — LIDOCAINE 1 PATCH: 50 PATCH CUTANEOUS at 08:36

## 2022-05-03 RX ADMIN — SODIUM CHLORIDE, SODIUM GLUCONATE, SODIUM ACETATE, POTASSIUM CHLORIDE, MAGNESIUM CHLORIDE, SODIUM PHOSPHATE, DIBASIC, AND POTASSIUM PHOSPHATE 50 ML/HR: .53; .5; .37; .037; .03; .012; .00082 INJECTION, SOLUTION INTRAVENOUS at 14:46

## 2022-05-03 RX ADMIN — SODIUM CHLORIDE 75 ML/HR: 0.9 INJECTION, SOLUTION INTRAVENOUS at 09:32

## 2022-05-03 RX ADMIN — DICLOFENAC SODIUM 2 G: 10 GEL TOPICAL at 08:37

## 2022-05-03 RX ADMIN — CLOPIDOGREL BISULFATE 75 MG: 75 TABLET ORAL at 08:36

## 2022-05-03 NOTE — CONSULTS
Consultation - Nephrology   Odessa Virk 80 y o  female MRN: [id number]  Unit/Bed#: E5 -01 Encounter: [id number]    ASSESSMENT/PLAN:    Hyponatremia  -sodium 130 on admission in the set of hyperglycemia/glucose corrected to 134, repeat this morning 140/glucose corrected to 141  -received 1 L 0 9% NSS on admission and maintained on 0 9% NSS infusion 75 mL/hour  -there is a concern that his HCTZ induced however I do not see HCTZ listed on her medication list   -Hyponatremia is mild, no concern for overcorrecting currently, will d/c NSS in favor of isolyte with stop time    Hypokalemia  -Unclear etiology, patients reports poor oral appetite, per family the nursing facility has indicated she does not eat well  Possible 2/2 hypomagnesemia vs  RTA? -potassium 2 9 on admission, repeat 2 8 today (received 60 mg once oral potassium yesterday),  -Received 20 PO K meq this morning, will give an additional 40 mEq p o  now and 40 meq PO this afternoon  -Repeat BMP tomorrow morning    Hypomagnesemia  -likely contributing to hypokalemia, Mag was 1 8 initially on admission, has not received any Mag replacement, will give 2gm IV Mag now  -trend Mag level daily    Hypertension  - home medications include Norvasc 5 mg daily, currently continue while inpatient with hold parameters  -blood pressure trends reviewed and are acceptable    Back pain  -PTOT consult, pain management per primary    Sepsis/UTI  -WBCs 15 on admission, UA with elevated leukocytes, innumerable bacteria, 10-20 wbc's  Urine and blood cultures pending  -started on ceftriaxone by admitting team    Hyperglycemia  -POC glucose elvated on admission  -A1c 5 8 (2018)  -Not currently on antihyperglycemics     HISTORY OF PRESENT ILLNESS:  Requesting Physician: Desean Franco DO  Reason for Consult:  Hyponatremia, hypokalemia    Odessa Virk is a 80y o  year old female who was admitted to [facility] after presenting with back pain    Per chart review she has a history of chronic back pain due to degenerative disc disease, is wheelchair-bound for most of the day, reported worsening lower back pain at her facility but denied traumatic injury  Recently had a urine test performed was started on antibiotics at her nursing facility  In the emergency department she is found to be mildly hyponatremic, hypokalemic, and with leukocytosis concerning for sepsis/UTI and was admitted to the hospital  She reports her appetite is not chronically not great and typically does not eat much  A renal consultation is requested today for assistance in the management of hyponatremia and hypokalemia      PAST MEDICAL HISTORY:  Past Medical History:   Diagnosis Date    Abnormal gait     Chronic renal failure     Diabetes mellitus (HCC)     GERD (gastroesophageal reflux disease)     H  pylori infection     Hypertension     Hypertensive urgency     Pancreatic lesion     Premature atrial beats     Renal disorder     TIA (transient ischemic attack)        PAST SURGICAL HISTORY:  Past Surgical History:   Procedure Laterality Date    CHOLECYSTECTOMY         ALLERGIES:  Allergies   Allergen Reactions    Azithromycin Diarrhea    Pioglitazone     Quinapril     Sitagliptin        SOCIAL HISTORY:  Social History     Substance and Sexual Activity   Alcohol Use No     Social History     Substance and Sexual Activity   Drug Use No     Social History     Tobacco Use   Smoking Status Never Smoker   Smokeless Tobacco Never Used       FAMILY HISTORY:  Family History   Problem Relation Age of Onset    Hypertension Father        MEDICATIONS:    Current Facility-Administered Medications:     acetaminophen (TYLENOL) tablet 325 mg, 325 mg, Oral, Q6H PRN, Rosalia Joseph MD    amLODIPine (NORVASC) tablet 5 mg, 5 mg, Oral, After Dinner, ANGI Rees    aspirin chewable tablet 81 mg, 81 mg, Oral, Daily, Rosalia Joseph MD, 81 mg at 05/03/22 6734   atorvastatin (LIPITOR) tablet 40 mg, 40 mg, Oral, QPM, Chloe Delarosa MD, 40 mg at 05/02/22 2031    bisacodyl (DULCOLAX) EC tablet 10 mg, 10 mg, Oral, Daily PRN, Chloe Delarosa MD    ceftriaxone (ROCEPHIN) 1 g/50 mL in dextrose IVPB, 1,000 mg, Intravenous, Q24H, Chloe Delarosa MD    clopidogrel (PLAVIX) tablet 75 mg, 75 mg, Oral, Daily, Chloe Delarosa MD, 75 mg at 05/03/22 9681    Diclofenac Sodium (VOLTAREN) 1 % topical gel 2 g, 2 g, Topical, 4x Daily, Chloe Delarosa MD, 2 g at 05/03/22 1100    enoxaparin (LOVENOX) subcutaneous injection 30 mg, 30 mg, Subcutaneous, Daily, Chloe Delarosa MD, 30 mg at 05/03/22 0836    HYDROcodone-acetaminophen (NORCO) 5-325 mg per tablet 1 tablet, 1 tablet, Oral, Q6H PRN, Chloe Delarosa MD, 1 tablet at 05/02/22 2031    lidocaine (LIDODERM) 5 % patch 1 patch, 1 patch, Topical, Daily, Chloe Delarosa MD, 1 patch at 05/03/22 3901    magnesium sulfate 2 g/50 mL IVPB (premix) 2 g, 2 g, Intravenous, Once, Dusty Brisk, CRNP    multi-electrolyte (PLASMALYTE-A/ISOLYTE-S PH 7 4) IV solution, 50 mL/hr, Intravenous, Continuous, Dusty Brisk, CRNP    pantoprazole (PROTONIX) EC tablet 20 mg, 20 mg, Oral, Daily Before Breakfast, Chloe Delarosa MD, 20 mg at 05/03/22 0965    potassium chloride (K-DUR,KLOR-CON) CR tablet 40 mEq, 40 mEq, Oral, Once, Dusty Brisk, CRNP    REVIEW OF SYSTEMS:  Review of Systems   Constitutional: Positive for appetite change  Negative for chills, diaphoresis, fatigue and fever  Respiratory: Negative for cough, choking and shortness of breath  Cardiovascular: Negative for chest pain and leg swelling  Genitourinary: Negative for difficulty urinating  Musculoskeletal: Positive for back pain  All other systems reviewed and are negative         PHYSICAL EXAM:  Current Weight: Weight - Scale: 58 2 kg (128 lb 6 4 oz)  First Weight: Weight - Scale: 58 2 kg (128 lb 6 4 oz)  Vitals:    05/02/22 1830 05/02/22 1952 05/02/22 2257 05/03/22 0724   BP: 168/79 138/76 136/72 140/70   BP Location:    Left arm   Pulse: 96 86 85 82   Resp: 18 19 18 18   Temp:  100 1 °F (37 8 °C) 99 8 °F (37 7 °C) 99 °F (37 2 °C)   TempSrc:    Oral   SpO2: 94% 95% 99% 92%   Weight:       Height:           Intake/Output Summary (Last 24 hours) at 5/3/2022 1255  Last data filed at 5/3/2022 1101  Gross per 24 hour   Intake --   Output 1 ml   Net -1 ml     Physical Exam  Vitals and nursing note reviewed  Constitutional:       General: She is not in acute distress  Appearance: She is normal weight  She is not toxic-appearing or diaphoretic  HENT:      Head: Normocephalic and atraumatic  Mouth/Throat:      Mouth: Mucous membranes are moist    Eyes:      General: No scleral icterus  Cardiovascular:      Rate and Rhythm: Normal rate and regular rhythm  Pulses: Normal pulses  Heart sounds: Normal heart sounds  Pulmonary:      Effort: Pulmonary effort is normal  No respiratory distress  Breath sounds: No wheezing or rales  Abdominal:      General: Abdomen is flat  There is no distension  Palpations: Abdomen is soft  Tenderness: There is no abdominal tenderness  Musculoskeletal:      Cervical back: Neck supple  Right lower leg: No edema  Left lower leg: No edema  Skin:     General: Skin is warm and dry  Capillary Refill: Capillary refill takes less than 2 seconds  Neurological:      Mental Status: She is alert and oriented to person, place, and time     Psychiatric:         Mood and Affect: Mood normal           Invasive Devices:      Lab Results:   Results from last 7 days   Lab Units 05/03/22  0524 05/02/22  1703 05/02/22  1526   WBC Thousand/uL 9 79  --  15 31*   HEMOGLOBIN g/dL 12 3  --  16 3*   HEMATOCRIT % 37 2  --  44 3   PLATELETS Thousands/uL 238  --  322   POTASSIUM mmol/L 2 8*  --  2 9* CHLORIDE mmol/L 108  --  91*   CO2 mmol/L 22  --  25   BUN mg/dL 9  --  16   CREATININE mg/dL 0 63  --  0 98   CALCIUM mg/dL 6 8*  --  9 6   MAGNESIUM mg/dL  --  1 8  --    ALK PHOS U/L  --  84  --    ALT U/L  --  20  --    AST U/L  --  12  --        I have personally reviewed the blood work as stated above and in my note  I have personally reviewed CT AP imaging studies  I have personally reviewed internal medicine and ED note

## 2022-05-03 NOTE — PLAN OF CARE
Problem: PHYSICAL THERAPY ADULT  Goal: Performs mobility at highest level of function for planned discharge setting  See evaluation for individualized goals  Description: Treatment/Interventions: Functional transfer training,LE strengthening/ROM,Therapeutic exercise,Cognitive reorientation,Patient/family training,Equipment eval/education,Bed mobility,Gait training,Compensatory technique education,Continued evaluation,Spoke to nursing,OT          See flowsheet documentation for full assessment, interventions and recommendations  5/3/2022 1316 by Patricia Antonio, PT  Note: Prognosis: Fair  Problem List: Decreased strength,Impaired balance,Decreased mobility,Impaired judgement,Decreased safety awareness,Impaired hearing,Pain  Assessment: Han Garcia is a 80 y o  female admitted to [address] on 5/2/2022 for Sepsis due to urinary tract infection ([facility] [address] )  PT was consulted and pt was seen on 5/3/2022 for mobility assessment and d/c planning  Pt presents w high fall risk, acute back and leg pain, multiple lines  At baseline is indep for ADLs (S showers) and mobility; somewhat poor historian- would clarify PLOF  Pt is currently functioning at a moderate assistance x2 level for bed mobility, min Ax2 for transfers and ambulation w RW bed>chair  Pt demonstrated deficits of cognition, hearing, strength, balance and pain impacting mobility  Needs Ax2 to safely complete mobility, constant cuing for technique and encouragement, unable to ambulate household distances and is at increased risk for falls  Pt will benefit from continued skilled IP PT to address the above mentioned impairments  in order to maximize recovery and increase functional independence when completing mobility and ADLs  At this time PT recommendations for d/c are STR given social barriers, decline in function, fall risk    Barriers to Discharge: Decreased caregiver support        PT Discharge Recommendation: Post acute rehabilitation services          See flowsheet documentation for full assessment  5/3/2022 1316 by Ricky Tristan, PT  Note: Prognosis: Fair  Problem List: Decreased strength,Impaired balance,Decreased mobility,Impaired judgement,Decreased safety awareness,Impaired hearing,Pain  Assessment: Maksim Richardson is a 80 y o  female admitted to [facility] on 5/2/2022 for Sepsis due to urinary tract infection ([address] )  PT was consulted and pt was seen on 5/3/2022 for mobility assessment and d/c planning  Pt presents w high fall risk, acute back and leg pain, multiple lines  At baseline is indep for ADLs (S showers) and mobility; somewhat poor historian- would clarify PLOF  Pt is currently functioning at a moderate assistance x2 level for bed mobility, min Ax2 for transfers and ambulation w RW bed>chair  Pt demonstrated deficits of cognition, hearing, strength, balance and pain impacting mobility  Needs Ax2 to safely complete mobility, constant cuing for technique and encouragement, unable to ambulate household distances and is at increased risk for falls  Pt will benefit from continued skilled IP PT to address the above mentioned impairments  in order to maximize recovery and increase functional independence when completing mobility and ADLs  At this time PT recommendations for d/c are STR given social barriers, decline in function, fall risk  Barriers to Discharge: Decreased caregiver support        PT Discharge Recommendation: Post acute rehabilitation services          See flowsheet documentation for full assessment

## 2022-05-03 NOTE — PLAN OF CARE
Problem: OCCUPATIONAL THERAPY ADULT  Goal: Performs self-care activities at highest level of function for planned discharge setting  See evaluation for individualized goals  Description: Treatment Interventions: ADL retraining,Functional transfer training,UE strengthening/ROM,Endurance training,Patient/family training,Equipment evaluation/education,Compensatory technique education,Continued evaluation,Energy conservation,Activityengagement          See flowsheet documentation for full assessment, interventions and recommendations  Note: Limitation: Decreased ADL status,Decreased UE ROM,Decreased Safe judgement during ADL,Decreased cognition,Decreased endurance,Decreased self-care trans,Decreased high-level ADLs (increased pain)  Prognosis: Good  Assessment: Pt is a 80 y o  female seen for OT evaluation s/p adm to [facility]  on 5/2/2022 w/ back pain and admitted w/ Sepsis due to UTI, Chronic midline low back pain without sciatica, Hyponatremia, and Hypokalemia  CT abdomen/pelvis: "No acute fracture or destructive osseous lesion  Spinal degenerative changes are noted  Severe bilateral hip osteoarthritis  Multilevel vertebral body height loss is chronic, but has progressed since prior CT " Comorbidities affecting pts functional performance include a significant PMH of DM, HTN, TIA  Pt with active OT orders  Pt lives alone in a one level apt at [facility]  At baseline, pt reports primarily I w/ ADLs (assist for showers only) and requiring assist w/ IADLs  Pt reports primarily W/C bound, able to complete stand pivot transfers to/from W/C at a Mod I level w/o use of RW  Per chart: Ambulates short distances   Upon evaluation, pt currently requires Min A for UB ADLs, Max-Mod A for LB ADLs, Mod A for toileting, Mod A of 2 for bed mobility, and Min A of 2 for functional mobility/transfers 2* the following deficits impacting occupational performance: decreased ROM, decreased strength, decreased balance, decreased tolerance, impaired problem solving, decreased safety awareness and increased pain  These impairments, as well at pts fall risk, limited home support, difficulty performing ADLS and limited insight into deficits limit pts ability to safely engage in all baseline areas of occupation  Pt to continue to benefit from continued acute OT services during hospital stay to address defined deficits and to maximize level of functional independence in the following Occupational Performance areas: grooming, bathing/shower, toilet hygiene, dressing, health maintenance, functional mobility, community mobility, clothing management and social participation  From OT standpoint, recommend STR upon D/C   OT will continue to follow pt 3-5x/wk to address the following goals to  w/in 10-14 days:     OT Discharge Recommendation: Post acute rehabilitation services  OT - OK to Discharge: Yes (when medically cleared to rehab)

## 2022-05-03 NOTE — PLAN OF CARE
Problem: Potential for Falls  Goal: Patient will remain free of falls  Description: INTERVENTIONS:  - Educate patient/family on patient safety including physical limitations  - Instruct patient to call for assistance with activity   - Consult OT/PT to assist with strengthening/mobility   - Keep Call bell within reach  - Keep bed low and locked with side rails adjusted as appropriate  - Keep care items and personal belongings within reach  - Initiate and maintain comfort rounds  - Make Fall Risk Sign visible to staff  - Offer Toileting every  Hours, in advance of need  - Initiate/Maintain alarm  - Obtain necessary fall risk management equipment:   - Apply yellow socks and bracelet for high fall risk patients  - Consider moving patient to room near nurses station  5/2/2022 2313 by Nigel Whitney RN  Outcome: Progressing  5/2/2022 2312 by Nigel Whitney RN  Outcome: Progressing     Problem: MOBILITY - ADULT  Goal: Maintain or return to baseline ADL function  Description: INTERVENTIONS:  -  Assess patient's ability to carry out ADLs; assess patient's baseline for ADL function and identify physical deficits which impact ability to perform ADLs (bathing, care of mouth/teeth, toileting, grooming, dressing, etc )  - Assess/evaluate cause of self-care deficits   - Assess range of motion  - Assess patient's mobility; develop plan if impaired  - Assess patient's need for assistive devices and provide as appropriate  - Encourage maximum independence but intervene and supervise when necessary  - Involve family in performance of ADLs  - Assess for home care needs following discharge   - Consider OT consult to assist with ADL evaluation and planning for discharge  - Provide patient education as appropriate  5/2/2022 2313 by Nigel Whitney RN  Outcome: Progressing  5/2/2022 2312 by Nigel Whitney RN  Outcome: Progressing  Goal: Maintains/Returns to pre admission functional level  Description: INTERVENTIONS:  - Perform BMAT or MOVE assessment daily    - Set and communicate daily mobility goal to care team and patient/family/caregiver  - Collaborate with rehabilitation services on mobility goals if consulted  - Perform Range of Motion  times a day  - Reposition patient every  hours    - Dangle patient  times a day  - Stand patient  times a day  - Ambulate patient  times a day  - Out of bed to chair  times a day   - Out of bed for meals  times a day  - Out of bed for toileting  - Record patient progress and toleration of activity level   5/2/2022 2313 by Arturo Garcia RN  Outcome: Progressing  5/2/2022 2312 by Arturo Garcia RN  Outcome: Progressing     Problem: PAIN - ADULT  Goal: Verbalizes/displays adequate comfort level or baseline comfort level  Description: Interventions:  - Encourage patient to monitor pain and request assistance  - Assess pain using appropriate pain scale  - Administer analgesics based on type and severity of pain and evaluate response  - Implement non-pharmacological measures as appropriate and evaluate response  - Consider cultural and social influences on pain and pain management  - Notify physician/advanced practitioner if interventions unsuccessful or patient reports new pain  Outcome: Progressing     Problem: INFECTION - ADULT  Goal: Absence or prevention of progression during hospitalization  Description: INTERVENTIONS:  - Assess and monitor for signs and symptoms of infection  - Monitor lab/diagnostic results  - Monitor all insertion sites, i e  indwelling lines, tubes, and drains  - Monitor endotracheal if appropriate and nasal secretions for changes in amount and color  - [place] appropriate cooling/warming therapies per order  - Administer medications as ordered  - Instruct and encourage patient and family to use good hand hygiene technique  - Identify and instruct in appropriate isolation precautions for identified infection/condition  Outcome: Progressing  Goal: Absence of fever/infection during neutropenic period  Description: INTERVENTIONS:  - Monitor WBC    Outcome: Progressing     Problem: SAFETY ADULT  Goal: Patient will remain free of falls  Description: INTERVENTIONS:  - Educate patient/family on patient safety including physical limitations  - Instruct patient to call for assistance with activity   - Consult OT/PT to assist with strengthening/mobility   - Keep Call bell within reach  - Keep bed low and locked with side rails adjusted as appropriate  - Keep care items and personal belongings within reach  - Initiate and maintain comfort rounds  - Make Fall Risk Sign visible to staff  - Offer Toileting every  Hours, in advance of need  - Initiate/Maintain alarm  - Obtain necessary fall risk management equipment:   - Apply yellow socks and bracelet for high fall risk patients  - Consider moving patient to room near nurses station  5/2/2022 2313 by Jaya Devries RN  Outcome: Progressing  5/2/2022 2312 by Jaya Devries RN  Outcome: Progressing  Goal: Maintain or return to baseline ADL function  Description: INTERVENTIONS:  -  Assess patient's ability to carry out ADLs; assess patient's baseline for ADL function and identify physical deficits which impact ability to perform ADLs (bathing, care of mouth/teeth, toileting, grooming, dressing, etc )  - Assess/evaluate cause of self-care deficits   - Assess range of motion  - Assess patient's mobility; develop plan if impaired  - Assess patient's need for assistive devices and provide as appropriate  - Encourage maximum independence but intervene and supervise when necessary  - Involve family in performance of ADLs  - Assess for home care needs following discharge   - Consider OT consult to assist with ADL evaluation and planning for discharge  - Provide patient education as appropriate  5/2/2022 2313 by Jaya Devries RN  Outcome: Progressing  5/2/2022 2312 by Jaya Devries RN  Outcome: Progressing  Goal: Maintains/Returns to pre admission functional level  Description: INTERVENTIONS:  - Perform BMAT or MOVE assessment daily    - Set and communicate daily mobility goal to care team and patient/family/caregiver  - Collaborate with rehabilitation services on mobility goals if consulted  - Perform Range of Motion  times a day  - Reposition patient every  hours  - Dangle patient  times a day  - Stand patient  times a day  - Ambulate patient  times a day  - Out of bed to chair  times a day   - Out of bed for meals times a day  - Out of bed for toileting  - Record patient progress and toleration of activity level   5/2/2022 2313 by Zuleyka Fernandes RN  Outcome: Progressing  5/2/2022 2312 by Zuleyka Fernandes RN  Outcome: Progressing     Problem: DISCHARGE PLANNING  Goal: Discharge to home or other facility with appropriate resources  Description: INTERVENTIONS:  - Identify barriers to discharge w/patient and caregiver  - Arrange for needed discharge resources and transportation as appropriate  - Identify discharge learning needs (meds, wound care, etc )  - Arrange for interpretive services to assist at discharge as needed  - Refer to Case Management Department for coordinating discharge planning if the patient needs post-hospital services based on physician/advanced practitioner order or complex needs related to functional status, cognitive ability, or social support system  Outcome: Progressing     Problem: Knowledge Deficit  Goal: Patient/family/caregiver demonstrates understanding of disease process, treatment plan, medications, and discharge instructions  Description: Complete learning assessment and assess knowledge base    Interventions:  - Provide teaching at level of understanding  - Provide teaching via preferred learning methods  Outcome: Progressing

## 2022-05-03 NOTE — ASSESSMENT & PLAN NOTE
· Acute on chronic low back pain secondary to lumbar degenerative disc disease and severe bilateral hip arthritis  · Continue pain regimen  · Ortho consult pending

## 2022-05-03 NOTE — CASE MANAGEMENT
Case Management Assessment    Patient name Han Garcia  Location [facility] [address] MRN [id number]  : 1929 Date 5/3/2022       Current Admission Date: 2022  Current Admission Diagnosis:Sepsis due to urinary tract infection [facility])   Patient Active Problem List    Diagnosis Date Noted    [facility] 20[facility]    Hyponatremia 2022    Hypokalemia 2022    Chronic midline low back pain without sciatica 2022    Abnormal CT scan, sinus 10/01/2019    Sinus bradycardia 10/01/2019    Closed fracture of fourth metatarsal bone 2019    Essential hypertension 2019    Premature atrial beats 2019    Type 2 diabetes mellitus with complication, with long-term current use of insulin ([facility] ) 2019    Microscopic hematuria 2019    Pancreatic lesion 2019    Abnormal urine cytology 2019    Chronic renal failure 2019    Osteopenia 2019    Physical deconditioning 2018    Abnormality of gait 2018    Hyperglycemia 2018    Transient cerebral ischemia 12/10/2018    Hypertensive urgency 12/10/2018    Gastroesophageal reflux 12/10/2018      LOS (days): 1  Geometric Mean LOS (GMLOS) (days): 3 50  Days to GMLOS:2 6     OBJECTIVE:    Risk of Unplanned Readmission Score: 14         Current admission status: Inpatient       Preferred Pharmacy:   [address] [place] [address] Drive  Phone: [phone] Fax: [phone]    Primary Care Provider: No primary care provider on file  Primary Insurance: MEDICARE  Secondary Insurance:     ASSESSMENT:  Active Health Care Proxies    There are no active Health Care Proxies on file         Advance Directives  Does patient have a Health Care POA?: Yes  Does patient have Advance Directives?: Yes  Advance Directives: Living will,Power of  for health care,Power of  for finance  Primary Contact: Santos [phone]    Readmission Root Cause  30 Day Readmission: No    Patient Information  Admitted from[de-identified] Facility  Mental Status: Confused  During Assessment patient was accompanied by: Not accompanied during assessment  Assessment information provided by[de-identified] Son  Primary Caregiver: Self  Support Systems: Private Caregivers,Organized support group (Comment)  Type of Current Residence: Facility (Son)  Living Arrangements: Lives [facility] (Comment) (Son)    Activities of Daily Living Prior to Admission  Functional Status: Assistance  Completes ADLs independently?: No  Level of ADL dependence: Assistance  Ambulates independently?: No  Level of ambulatory dependence: Assistance  Does patient use assisted devices?: Yes  Assisted Devices (DME) used: Wheelchair  Does patient currently own DME?: Yes  What DME does the patient currently own?: Wheelchair  Does patient have a history of Outpatient Therapy (PT/OT)?: No  Does the patient have a history of Short-Term Rehab?: Yes ([facility])  Does patient have a history of HHC?: No  Does patient currently have [facility]?: No    Patient Information Continued  Does patient have prescription coverage?: Yes  Does patient have a history of substance abuse?: No  Does patient have a history of Mental Health Diagnosis?: No    Patient lives at [place] [address]  Patient will need a [facility] transport to return

## 2022-05-03 NOTE — PHYSICAL THERAPY NOTE
PHYSICAL THERAPY EVALUATION          Patient Name: Minus Schaumann  Today's Date: 5/3/2022   PT EVALUATION    80 y o     [id number]    Acute hyponatremia [E87 1]  Acute hypokalemia [E87 6]  Back pain [M54 9]  Left lumbar radiculopathy [M54 16]  Acute UTI [N39 0]  Generalized weakness [R53 1]  Acute exacerbation of chronic low back pain [M54 50, G89 29]  Sepsis ([address] ) [A41 9]  Chronic midline low back pain without sciatica [M54 50, G89 29]    Past Medical History:   Diagnosis Date    Abnormal gait     Chronic renal failure     Diabetes mellitus (HCC)     GERD (gastroesophageal reflux disease)     H  pylori infection     Hypertension     Hypertensive urgency     Pancreatic lesion     Premature atrial beats     Renal disorder     TIA (transient ischemic attack)      Past Surgical History:   Procedure Laterality Date    CHOLECYSTECTOMY          05/03/22 0906   PT Last Visit   PT Visit Date 05/03/22   Note Type   Note type Evaluation   Pain Assessment   Pain Assessment Tool 0-10   Pain Score 5   Pain Location/Orientation Location: Back;Orientation: Left; Location: Leg   Pain Radiating Towards [facility] Pain Intervention(s) Repositioned; Ambulation/increased activity; Elevated; Emotional support; Rest   Restrictions/Precautions   Weight Bearing Precautions Per Order No   Other Precautions Cognitive; Chair Alarm; Bed Alarm;Multiple lines;Telemetry; Fall Risk;Pain;Hard of hearing  (PIV)   Home Living   Type of [facility] [address] level;Ramped entrance;Elevator   Bathroom Shower/Tub Tub/shower unit   Bathroom Toilet Raised   Bathroom Equipment Grab bars in shower; Tub transfer bench;Grab bars around toilet   [address]   Additional Comments per patient from [address] with ADLs and functional mobility   Lives With Alone   Receives Help From Personal care attendant  (facility staff)   ADL Assistance   (indep ADLs, has S for showers)   Falls in the last 6 months 0   Comments per patient, indep for mobility at baseline including stand piv transfer to , self propels in wc  chart indicated pt ambulating short distances pta  General   Additional Pertinent History pt admitted 5/2/22 for sepsis due to UTI  pmhx significant for DM, TIA   Cognition   Overall Cognitive Status Impaired   Arousal/Participation Cooperative   Attention Attends with cues to redirect   Orientation Level Oriented to person;Oriented to place;Oriented to time   Memory Decreased recall of precautions;Decreased short term memory   Following Commands Follows one step commands with increased time or repetition   Comments unclear baseline mentation  appears confused and anxious during session requiring frequent redirection  unclear if baseline cognitive deficits vs [place]   Subjective   Subjective "I cant do it"   RLE Assessment   RLE Assessment X  (pain limited)   LLE Assessment   LLE Assessment X  (pain limited)   Bed Mobility   Rolling R 5  Supervision   Additional items Bedrails; Increased time required   Rolling L 5  Supervision   Additional items Bedrails; Increased time required   Supine to Sit 3  Moderate assistance   Additional items Assist x 2;HOB elevated; Increased time required; Bedrails;Verbal cues;LE management; Other  (trunk support)   Transfers   Sit to Stand 4  Minimal assistance   Additional items Assist x 2; Increased time required;Verbal cues; Other  (RW)   Stand to Sit 4  Minimal assistance   Additional items Assist x 2;Armrests; Increased time required;Verbal cues; Other  (RW)   Ambulation/Elevation   Gait pattern L Hemiparesis; Improper Weight shift; Antalgic;Decreased foot clearance; Short stride; Excessively slow; Redundant gait at times   Gait Assistance 4  Minimal assist   Additional items Assist x 2;Verbal cues; Tactile cues  (for upright support, stability, wt shifting)   Assistive Device Rolling walker   Distance 2'   Balance   Dynamic Sitting Fair -   Static Standing Poor +   Dynamic Standing Poor   Ambulatory Poor   Activity Tolerance   Activity Tolerance Patient limited by pain; Other (Comment)  (cognition)   Medical Staff Made [address] OT   Nurse Made Aware [facility] RN   Assessment   Prognosis Fair   Problem List Decreased strength; Impaired balance;Decreased mobility; Impaired judgement;Decreased safety awareness; Impaired hearing;Pain   Assessment Trevor Mohan is a 80 y o  female admitted to [facility] on 5/2/2022 for Sepsis due to urinary tract infection ([address] )  PT was consulted and pt was seen on 5/3/2022 for mobility assessment and d/c planning  Pt presents w high fall risk, acute back and leg pain, multiple lines  At baseline is indep for ADLs (S showers) and mobility; somewhat poor historian- would clarify PLOF  Pt is currently functioning at a moderate assistance x2 level for bed mobility, min Ax2 for transfers and ambulation w RW bed>chair  Pt demonstrated deficits of cognition, hearing, strength, balance and pain impacting mobility  Needs Ax2 to safely complete mobility, constant cuing for technique and encouragement, unable to ambulate household distances and is at increased risk for falls  Pt will benefit from continued skilled IP PT to address the above mentioned impairments  in order to maximize recovery and increase functional independence when completing mobility and ADLs  At this time PT recommendations for d/c are STR given social barriers, decline in function, fall risk  Barriers to Discharge Decreased caregiver support   Goals   Patient Goals decrease leg pain   STG Expiration Date 05/17/22   Short Term Goal #1 1)  Pt will perform bed mobility with S demonstrating appropriate technique 100% of the time in order to improve function  2)  Perform all transfers with S demonstrating safe and appropriate technique 100% of the time in order to improve ability to negotiate safely in home environment  3) Amb with least restrictive AD > 25'x2 with mod I in order to demonstrate ability to negotiate in home environment  4)  Improve overall strength and balance 1/2 grade in order to optimize ability to perform functional tasks and reduce fall risk  5) Increase activity tolerance to 45 minutes in order to improve endurance to functional tasks  6) Wc mobility >50' at S level  7)PT for ongoing patient and family/caregiver education, DME needs and d/c planning in order to promote highest level of function in least restrictive environment  Plan   Treatment/Interventions Functional transfer training;LE strengthening/ROM; Therapeutic exercise;Cognitive reorientation;Patient/family training;Equipment eval/education; Bed mobility;Gait training; Compensatory technique education;Continued evaluation;Spoke to nursing;OT   PT Frequency 3-5x/wk   Recommendation   PT Discharge Recommendation Post acute rehabilitation services   AM-PAC Basic Mobility Inpatient   Turning in Bed Without Bedrails 3   Lying on Back to Sitting on Edge of Flat Bed 1   Moving Bed to Chair 2   Standing Up From Chair 2   Walk in Room 2   Climb 3-5 Stairs 1   Basic Mobility Inpatient Raw Score 11   Basic Mobility Standardized Score 30 25   Highest Level Of Mobility   JH-HLM Goal 4: Move to chair/commode   JH-HLM Highest Level of Mobility 4: Move to chair/commode   JH-HLM Goal Achieved Yes   End of Consult   Patient Position at End of Consult Bedside chair;Bed/Chair alarm activated; All needs within reach   History: co - morbidities, social background, fall risk, use of assistive device, cognition, multiple lines  Exam: impairments in systems including musculoskeletal (strength), neuromuscular (balance, gait, transfers, motor function), am-pac, cognition  Clinical: unstable/unpredictable  Complexity:high      Kath Para, PT

## 2022-05-03 NOTE — CASE MANAGEMENT
Case Management Discharge Planning Note    Patient name Minus Schaumann  Location [address]  [address]-* MRN [id number]  : 1929 Date 5/3/2022       Current Admission Date: 2022  Current Admission Diagnosis:Sepsis due to urinary tract infection [facility])   Patient Active Problem List    Diagnosis Date Noted    [facility]    Hyponatremia 2022    Hypokalemia 2022    Chronic midline low back pain without sciatica 2022    Abnormal CT scan, sinus 10/01/2019    Sinus bradycardia 10/01/2019    Closed fracture of fourth metatarsal bone 2019    Essential hypertension 2019    Premature atrial beats 2019    Type 2 diabetes mellitus with complication, with long-term current use of insulin ([facility] ) 2019    Microscopic hematuria 2019    Pancreatic lesion 2019    Abnormal urine cytology 2019    Chronic renal failure 2019    Osteopenia 2019    Physical deconditioning 2018    Abnormality of gait 2018    Hyperglycemia 2018    Transient cerebral ischemia 12/10/2018    Hypertensive urgency 12/10/2018    Gastroesophageal reflux 12/10/2018      LOS (days): 1  Geometric Mean LOS (GMLOS) (days): 3 50  Days to GMLOS:2 7     OBJECTIVE:  Risk of Unplanned Readmission Score: 14         Current admission status: Inpatient   Preferred Pharmacy:   [address] [place] [address] Drive  Phone: [phone] Fax: [phone]    Primary Care Provider: No primary care provider on file  Primary Insurance: MEDICARE  Secondary Insurance:     DISCHARGE DETAILS:    Left voicemail for son for discharge planning

## 2022-05-03 NOTE — ASSESSMENT & PLAN NOTE
· Glucose noted to be 334 on admission BMP  · Update HhA1c (was 5 8 in Dec  2018)  · Fasting sugar 179 on this AM's BMP  · Start SSI coverage and glucose checks TID AC and QHS

## 2022-05-03 NOTE — PROGRESS NOTES
[address]  Progress Note Mark Shelton 8/7/1929, 80 y o  female MRN: [id number]  Unit/Bed#: E5 -01 Encounter: [id number]  Primary Care Provider: No primary care provider on file  Date and time admitted to hospital: 5/2/2022  2:33 PM    * Sepsis due to urinary tract infection ([address] )  Assessment & Plan  · POA as evidenced by leukocytosis and tachycardia (also note low grade, 100 1, temp) from possible urinary tract infection  · Continue ceftriaxone   · Urine culture pending  · Leukocytosis has resolved   · Blood cultures pending     Hypokalemia  Assessment & Plan  · Unclear etiology  · Does not appear to have HCTZ listed on PTA meds  · Potassium level actually dropped despite repletion on admission  Nephrology consulted  · Replete and monitor  · Replete mag  · BMP in AM    Hyponatremia  Assessment & Plan  · Does not appear to have HCTZ listed on PTA meds  · Nephrology consulted as above    Hyperglycemia  Assessment & Plan  · Glucose noted to be 334 on admission BMP  · Update HhA1c (was 5 8 in Dec  2018)  · Fasting sugar 179 on this AM's BMP  · Start SSI coverage and glucose checks TID AC and QHS    Essential hypertension  Assessment & Plan  · Continue amlodipine    Chronic midline low back pain without sciatica  Assessment & Plan  · Acute on chronic low back pain secondary to lumbar degenerative disc disease and severe bilateral hip arthritis  · Continue pain regimen  · Ortho consult pending         VTE Pharmacologic Prophylaxis: VTE Score: 5 High Risk (Score >/= 5) - Pharmacological DVT Prophylaxis Ordered: enoxaparin (Lovenox)  Sequential Compression Devices Ordered  Patient Centered Rounds: I performed bedside rounds with nursing staff today  Discussions with Specialists or Other Care Team Provider: Nephrology AP    Education and Discussions with Family / Patient: son, Miroslava Her, who asked that I call him back later as he was leaving for an appointment         Time Spent for Care: 20 minutes  More than 50% of total time spent on counseling and coordination of care as described above  Current Length of Stay: 1 day(s)  Current Patient Status: Inpatient   Certification Statement: The patient will continue to require additional inpatient hospital stay due to IV abx, pending urine culture, hypokalemia  Discharge Plan: Anticipate discharge in 48 hrs to rehab facility  Code Status: Level 3 - DNAR and DNI    Subjective:   Ms Kwadwo Yañez reports feeling fatigued today  She reports poor PO intake  She denies CP, SOB, abdominal pain, diarrhea or vomiting     Objective:     Vitals:   Temp (24hrs), Av 6 °F (37 6 °C), Min:99 °F (37 2 °C), Max:100 1 °F (37 8 °C)    Temp:  [99 °F (37 2 °C)-100 1 °F (37 8 °C)] 99 °F (37 2 °C)  HR:  [82-96] 82  Resp:  [16-19] 18  BP: (136-168)/(70-83) 140/70  SpO2:  [92 %-99 %] 92 %  Body mass index is 23 11 kg/m²  Input and Output Summary (last 24 hours): Intake/Output Summary (Last 24 hours) at 5/3/2022 1458  Last data filed at 5/3/2022 1101  Gross per 24 hour   Intake --   Output 1 ml   Net -1 ml       Physical Exam:   Physical Exam  Vitals and nursing note reviewed  Constitutional:       Comments: Patient seen lying in bed, NAD   Cardiovascular:      Rate and Rhythm: Normal rate and regular rhythm  Pulmonary:      Effort: Pulmonary effort is normal  No respiratory distress  Breath sounds: Normal breath sounds  Abdominal:      General: Bowel sounds are normal       Palpations: Abdomen is soft  Tenderness: There is no abdominal tenderness  Musculoskeletal:      Right lower leg: No edema  Left lower leg: No edema  Skin:     General: Skin is warm  Neurological:      Mental Status: She is alert        Comments: Oriented to person, hospital, and year   Psychiatric:         Mood and Affect: Mood normal          Behavior: Behavior normal         Additional Data:     Labs:  Results from last 7 days   Lab Units 22  0524   WBC Thousand/uL 9 79   HEMOGLOBIN g/dL 12 3   HEMATOCRIT % 37 2   PLATELETS Thousands/uL 238   NEUTROS PCT % 72   LYMPHS PCT % 18   MONOS PCT % 9   EOS PCT % 0     Results from last 7 days   Lab Units 05/03/22  0524 05/02/22  1703 05/02/22  1526   SODIUM mmol/L 140  --    < >   POTASSIUM mmol/L 2 8*  --    < >   CHLORIDE mmol/L 108  --    < >   CO2 mmol/L 22  --    < >   BUN mg/dL 9  --    < >   CREATININE mg/dL 0 63  --    < >   ANION GAP mmol/L 10  --    < >   CALCIUM mg/dL 6 8*  --    < >   ALBUMIN g/dL  --  3 4*  --    TOTAL BILIRUBIN mg/dL  --  1 09*  --    ALK PHOS U/L  --  84  --    ALT U/L  --  20  --    AST U/L  --  12  --    GLUCOSE RANDOM mg/dL 179*  --    < >    < > = values in this interval not displayed  Results from last 7 days   Lab Units 05/02/22  1655   INR  1 10             Results from last 7 days   Lab Units 05/02/22  1703   LACTIC ACID mmol/L 1 4       Lines/Drains:  Invasive Devices  Report    Peripheral Intravenous Line            Peripheral IV 05/02/22 Left Forearm 1 day                  Telemetry:  Telemetry Orders (From admission, onward)             24 Hour Telemetry Monitoring  Continuous x 24 Hours (Telem)        References:    Telemetry Guidelines   Question:  Reason for 24 Hour Telemetry  Answer:  Metabolic/Electrolyte Disturbance with High Probability of Dysrhythmia (K level <3 or >6, or KCL infusion >=10mEq/hr)                 Telemetry Reviewed: Normal Sinus Rhythm  Indication for Continued Telemetry Use: Metabolic/electrolyte disturbance with high probability of dysrhythmia             Imaging: Reviewed radiology reports from this admission including: abdominal/pelvic CT    Recent Cultures (last 7 days):   Results from last 7 days   Lab Units 05/02/22  1703   BLOOD CULTURE  Received in Microbiology Lab  Culture in Progress  Received in Microbiology Lab  Culture in Progress         Last 24 Hours Medication List:   Current Facility-Administered Medications   Medication Dose Route Frequency Provider Last Rate    acetaminophen  325 mg Oral Q6H PRN Summer Garcia MD      amLODIPine  5 mg Oral After Ival Clock, CRNP      aspirin  81 mg Oral Daily Summer Garcia MD      atorvastatin  40 mg Oral QPM Summer Garcia MD      bisacodyl  10 mg Oral Daily PRN Summer Garcia MD      calcium carbonate  1,000 mg Oral Once Wolfgang Means MD      calcium gluconate  1 g Intravenous Once Wolfgang Means MD      cefTRIAXone  1,000 mg Intravenous Q24H Summer Garcia MD      clopidogrel  75 mg Oral Daily Summer Garcia MD      Diclofenac Sodium  2 g Topical 4x Daily Summer Garcia MD      enoxaparin  30 mg Subcutaneous Daily Summer Garcia MD      HYDROcodone-acetaminophen  1 tablet Oral Q6H PRN Summer Garcia MD      insulin lispro  1-5 Units Subcutaneous TID [facility] Lien Duran PA-C      insulin lispro  1-5 Units Subcutaneous HS Lien Duran PA-C      lidocaine  1 patch Topical Daily Summer Garcia MD      pantoprazole  20 mg Oral Daily Before Breakfast Summer Garcia MD      potassium chloride  40 mEq Oral Once Maci Infante, ANGI          Today, Patient Was Seen By: Lien Duran PA-C    **Please Note: This note may have been constructed using a voice recognition system  **

## 2022-05-03 NOTE — OCCUPATIONAL THERAPY NOTE
Occupational Therapy Evaluation     Patient Name: Fer Hernandez  AOOCS'Y Date: 5/3/2022  Problem List  Principal Problem:    Sepsis due to urinary tract infection [facility])  Active Problems:    Essential hypertension    Hyponatremia    Hypokalemia    Chronic midline low back pain without sciatica    Past Medical History  Past Medical History:   Diagnosis Date    Abnormal gait     Chronic renal failure     Diabetes mellitus (HCC)     GERD (gastroesophageal reflux disease)     H  pylori infection     Hypertension     Hypertensive urgency     Pancreatic lesion     Premature atrial beats     Renal disorder     TIA (transient ischemic attack)      Past Surgical History  Past Surgical History:   Procedure Laterality Date    CHOLECYSTECTOMY             05/03/22 0905   OT Last Visit   OT Visit Date 05/03/22   Note Type   Note type Evaluation   Restrictions/Precautions   Weight Bearing Precautions Per Order No   Other Precautions Chair Alarm; Bed Alarm;Cognitive; Fall Risk;Pain;Multiple lines;Hard of hearing   Pain Assessment   Pain Assessment Tool 0-10   Pain Score 5   Pain Location/Orientation Location: Back   Pain Radiating Towards [facility] Pain Intervention(s) Repositioned; Ambulation/increased activity; Emotional support; Rest   Multiple Pain Sites No   Home Living   Type of Home Apartment  (hospitals )   Home Layout One level   Bathroom Shower/Tub Tub/shower unit   Bathroom Toilet Raised   Bathroom Equipment Grab bars in shower; Tub transfer bench;Grab bars around toilet   [address]   Additional Comments Pt lives alone in a one level apt at [facility]  Prior Function   Level of [place] Independent with ADLs and functional mobility; Needs assistance with IADLs   Lives With Alone   Receives Help From   (Facility staff)   ADL Assistance Independent  (assist for showers only)   IADLs Needs assistance   Falls in the last 6 months 0 Vocational Retired   Comments At baseline, pt reports primarily I w/ ADLs (assist for showers only) and requiring assist w/ IADLs  Pt reports primarily W/C bound, able to complete stand pivot transfers to/from W/C at a Mod I level w/o use of RW  Per chart: Ambulates short distances  Lifestyle   Autonomy At baseline, pt reports primarily I w/ ADLs (assist for showers only) and requiring assist w/ IADLs  Pt reports primarily W/C bound, able to complete stand pivot transfers to/from W/C at a Mod I level w/o use of RW  Per chart: Ambulates short distances  Reciprocal Relationships 2 sons, dtr   Service to Others Retired   Intrinsic Gratification Watching TV   Psychosocial   Psychosocial (WDL) WDL   ADL   Where [phone]  Supervision/Setup   Grooming Assistance 4  Minimal Assistance   [address] 4  Minimal Assistance   LB [address] 3  Moderate Assistance   [address] 4  [address] 2  Maximal [address]  3  Moderate Assistance   Functional Assistance 3  Moderate Assistance   Bed Mobility   Rolling R 5  Supervision   Additional items Bedrails; Increased time required;Verbal cues   Rolling L 5  Supervision   Additional items Bedrails; Increased time required;Verbal cues   Supine to Sit 3  Moderate assistance   Additional items Assist x 2;HOB elevated; Bedrails; Increased time required;Verbal cues;LE management  (cues for log roll technique)   Sit to Supine Unable to assess   Additional Comments Pt seated OOB in chair with chair alarm activated at end of session  Call bell and phone within reach  All needs met and pt reports no further questions for OT at this time  Transfers   Sit to Stand 4  Minimal assistance   Additional items Assist x 2;Bedrails; Increased time required;Verbal cues   Stand to Sit 4  Minimal assistance   Additional items Assist x 2;Armrests; Increased time required;Verbal cues   Stand pivot 4  Minimal assistance   Additional items Assist x 2; Increased time required;Verbal cues   Additional Comments Cues for safe technique and hand placement   Functional Mobility   Functional Mobility 4  Minimal assistance   Additional Comments Assist x2 for balance/steadying  Verbal cues for sequencing, increased encouragement required   Additional items Rolling walker   Balance   Static Sitting Fair   Dynamic Sitting Fair -   Static Standing Poor +   Dynamic Standing Poor   Ambulatory Poor   Activity Tolerance   Activity Tolerance Patient limited by fatigue;Patient limited by pain   Medical Staff Made FREDERIC Lauren   Nurse Made Aware yes; Bird Kaur RN   RUE Assessment   RUE Assessment X  (limited shldr ROM, elbow-distal=WFL)   RUE Strength   R Shoulder Flexion 3-/5   R Shoulder Extension 3-/5   R Elbow Flexion 3+/5   R Elbow Extension 3+/5   LUE Assessment   LUE Assessment X  (limited shldr ROM, elbow-distal=WFL)   LUE Strength   L Shoulder Flexion 3-/5   L Shoulder Extension 3-/5   L Elbow Flexion 3+/5   L Elbow Extension 3+/5   Hand Function   Gross Motor Coordination Functional   Fine Motor Coordination Functional   Sensation   Light Touch No apparent deficits   Proprioception   Proprioception No apparent deficits   Vision-Basic Assessment   Current Vision Wears glasses all the time   Vision - Complex Assessment   Acuity Able to read clock/calendar on wall without difficulty   Perception   Inattention/Neglect Appears intact   Cognition   Overall Cognitive Status Impaired   Arousal/Participation Alert; Cooperative   Attention Attends with cues to redirect   Orientation Level Oriented to person;Oriented to place;Oriented to time   Memory Decreased recall of precautions;Decreased recall of recent events   Following Commands Follows one step commands with increased time or repetition   Comments Increased encouragement for participation   Limited insight into deficits   Assessment   Limitation Decreased ADL status; Decreased UE ROM; Decreased Safe judgement during ADL;Decreased cognition;Decreased endurance;Decreased self-care trans;Decreased high-level ADLs  (increased pain)   Prognosis Good   Assessment Pt is a 80 y o  female seen for OT evaluation s/p adm to [facility] on 5/2/2022 w/ back pain and admitted w/ Sepsis due to UTI, Chronic midline low back pain without sciatica, Hyponatremia, and Hypokalemia  CT abdomen/pelvis: "No acute fracture or destructive osseous lesion  Spinal degenerative changes are noted  Severe bilateral hip osteoarthritis  Multilevel vertebral body height loss is chronic, but has progressed since prior CT " Comorbidities affecting pts functional performance include a significant PMH of DM, HTN, TIA  Pt with active OT orders  Pt lives alone in a one level apt at [facility]  At baseline, pt reports primarily I w/ ADLs (assist for showers only) and requiring assist w/ IADLs  Pt reports primarily W/C bound, able to complete stand pivot transfers to/from W/C at a Mod I level w/o use of RW  Per chart: Ambulates short distances  Upon evaluation, pt currently requires Min A for UB ADLs, Max-Mod A for LB ADLs, Mod A for toileting, Mod A of 2 for bed mobility, and Min A of 2 for functional mobility/transfers 2* the following deficits impacting occupational performance: decreased ROM, decreased strength, decreased balance, decreased tolerance, impaired problem solving, decreased safety awareness and increased pain  These impairments, as well at pts fall risk, limited home support, difficulty performing ADLS and limited insight into deficits limit pts ability to safely engage in all baseline areas of occupation   Pt to continue to benefit from continued acute OT services during hospital stay to address defined deficits and to maximize level of functional independence in the following Occupational Performance areas: grooming, bathing/shower, toilet hygiene, dressing, health maintenance, functional mobility, community mobility, clothing management and social participation  From OT standpoint, recommend STR upon D/C  OT will continue to follow pt 3-5x/wk to address the following goals to  w/in 10-14 days:   Goals   Patient Goals To have less pain   LTG Time Frame 10-14   Long Term Goal Please refer to LTGs listed below   Plan   Treatment Interventions ADL retraining;Functional transfer training;UE strengthening/ROM; Endurance training;Patient/family training;Equipment evaluation/education; Compensatory technique education;Continued evaluation; Energy conservation; Activityengagement   Goal Expiration Date 22   OT Treatment Day 0   OT Frequency 3-5x/wk   Recommendation   OT Discharge Recommendation Post acute rehabilitation services   OT - OK to Discharge Yes  (when medically cleared to rehab)   Additional Comments  The patient's raw score on the AM-PAC Daily Activity inpatient short form is 16, standardized score is 35 96, less than 39 4  Patients at this level are likely to benefit from discharge to post-acute rehabilitation services  Please refer to the recommendation of the Occupational Therapist for safe discharge planning  AM-PAC Daily Activity Inpatient   Lower Body Dressing 2   Bathing 2   Toileting 2   Upper Body Dressing 3   Grooming 3   Eating 4   Daily Activity Raw Score 16   Daily Activity Standardized Score (Calc for Raw Score >=11) 35 96   AM-[facility] Applied Cognition Inpatient   Following a Speech/Presentation 3   Understanding Ordinary Conversation 4   Taking Medications 3   Remembering Where Things Are Placed or Put Away 3   Remembering List of 4-5 Errands 3   Taking Care of Complicated Tasks 2   Applied Cognition Raw Score 18   Applied Cognition Standardized Score 38 07          GOALS    1  Pt will improve activity tolerance to G for min 30 min txment sessions for increase engagement in functional tasks    2   Pt will complete bed mobility at a Mod I level w/ G balance/safety demonstrated to decrease caregiver assistance required     3  Pt will complete UB/LB dressing w/ mod I using adaptive device and DME as needed     4  Pt will complete UB/LB bathing w/ Supervision using adaptive device and DME as needed    5  Pt will complete toileting w/ mod I w/ G hygiene/thoroughness using DME as needed    6  Pt will improve functional transfers to Mod I on/off all surfaces using DME as needed w/ G balance/safety     7  Pt will improve functional mobility during ADL/IADL/leisure tasks to Mod I using DME as needed w/ G balance/safety     8  Pt will be attentive 100% of the time during ongoing cognitive assessment w/ G participation to assist w/ safe d/c planning/recommendations    9  Pt will demonstrate G carryover of pt/caregiver education and training as appropriate w/o cues w/ good tolerance to increase safety during functional tasks    10  Pt will verbalize 3 potential fall hazards and identify appropriate compensatory techniques to decrease fall risk in home environment     11   Pt will increase standing tolerance to 3-5 mins with Fair+ dynamic standing balance to increase safety during participation in ADLs       Fay Garcia OTR/L

## 2022-05-03 NOTE — ASSESSMENT & PLAN NOTE
· Unclear etiology  · Does not appear to have HCTZ listed on PTA meds  · Potassium level actually dropped despite repletion on admission   Nephrology consulted  · Replete and monitor  · Replete mag  · BMP in AM

## 2022-05-03 NOTE — ASSESSMENT & PLAN NOTE
· POA as evidenced by leukocytosis and tachycardia (also note low grade, 100 1, temp) from possible urinary tract infection    · Continue ceftriaxone   · Urine culture pending  · Leukocytosis has resolved   · Blood cultures pending

## 2022-05-03 NOTE — CONSULTS
Consultation - Orthopedics   Wanda Claude EWNHBN 80 y o  female MRN: [id number]  Unit/Bed#: E5 -01 Encounter: [id number]      Assessment/Plan     Assessment:  81 yo female with significant thoracic and lumbar DDD, multiple compression fractures, weakness on exam, bilateral hip osteoarthritis    Plan:  · Order placed for MRI of the lumbar spine given weakness on exam and significant changes on CT  · Would recommend neurosurgery consult to evaluate after MRI  · WBAT to bilat LE    · PT/ OT  Order placed for TLSO brace which patient may try for comfort  · Pain control prn  · Medical management per primary team      History of Present Illness   Physician Requesting Consult: Jessica Rolon DO  Reason for Consult / Principal Problem: midline low back pain  HPI: Solomon Hernández is a 80y o  year old female who presents with low back pain x several weeks  Patient states that she started having worsening pain over the low back and buttock area starting around Easter  Patient denies any initial injury or any recent falls that started her pain  She states pain is intermittent but she has noted she has been getting progressively weaker as well  Patient states she spends the majority of her time in a wheelchair but is able to transfer from wheelchair to toilet or another seat  She does minimal walking  Patient states that she was getting physical therapy but the therapist had concerns that she was a fall risk and encouraged her to keep a wheelchair nearby  Patient denies any previous treatment for her back or hips including surgeries or injections  At this time she does not report any groin pain  Denies saddle anesthesia, distal numbness or tingling, bowel or bladder incontinence  Consults    ROS: see HPI, all other systems negative      Historical Information   Past Medical History:   Diagnosis Date    Abnormal gait     Chronic renal failure     Diabetes mellitus (HCC)     GERD (gastroesophageal reflux disease)     H  pylori infection     Hypertension     Hypertensive urgency     Pancreatic lesion     Premature atrial beats     Renal disorder     TIA (transient ischemic attack)      Past Surgical History:   Procedure Laterality Date    CHOLECYSTECTOMY       Social History   Social History     Substance and Sexual Activity   Alcohol Use No     Social History     Substance and Sexual Activity   Drug Use No     Social History     Tobacco Use   Smoking Status Never Smoker   Smokeless Tobacco Never Used     Family History:   Family History   Problem Relation Age of Onset    Hypertension Father        Meds/Allergies   Medications Prior to Admission   Medication    acetaminophen (TYLENOL) 325 mg tablet    amLODIPine (NORVASC) 5 mg tablet    aspirin 81 mg chewable tablet    atorvastatin (LIPITOR) 40 mg tablet    bisacodyl (DULCOLAX) 5 mg EC tablet    Cholecalciferol (CVS D3) 1000 units capsule    clopidogrel (PLAVIX) 75 mg tablet    HYDROcodone-acetaminophen (NORCO) 5-325 mg per tablet    pantoprazole (PROTONIX) 20 mg tablet    Petrolatum-Zinc Oxide (PHYTOPLEX Z-GUARD) 57-17 % PSTE    sorbitol 70 % solution     Allergies   Allergen Reactions    Azithromycin Diarrhea    Pioglitazone     Quinapril     Sitagliptin        Objective   Vitals: Blood pressure 140/70, pulse 82, temperature 99 °F (37 2 °C), temperature source Oral, resp  rate 18, height 5' 2 5" (1 588 m), weight 58 2 kg (128 lb 6 4 oz), SpO2 92 %  ,Body mass index is 23 11 kg/m²  No intake or output data in the 24 hours ending 05/03/22 0253  No intake/output data recorded  Invasive Devices  Report    Peripheral Intravenous Line            Peripheral IV 05/02/22 Left Forearm 1 day                PE:  Gen:  Awake and alert  HEENT:  Hearing intact  Heart:  Regular rate  Lungs: no audible wheezing  GI: no abdominal distension    Ortho Exam     Bilateral lower extremities:  Inspection- no ecchymosis, erythema, or abrasions     Palpation- no TTP  Thighs and calves soft and compressible  Strength- left leg 1/5 EHL, 3/5 AT, 5/5 GS, 5/5 quad, 5/5 hamstring, 5/5 hip flexors, 5/5 hip adductors, and 5/5 hip abductors  Right leg 5/5 EHL, AT, GS, quad, hip flexors, hip adductors, hip abductors  Neurovascular- sensation intact L4- S1  Neg babinski no clonus  Palpable pedal pulses       Lab Results:   CBC:   Lab Results   Component Value Date    WBC 9 79 05/03/2022    HGB 12 3 05/03/2022    HCT 37 2 05/03/2022    MCV 95 05/03/2022     05/03/2022    MCH 31 5 05/03/2022    MCHC 33 1 05/03/2022    RDW 13 2 05/03/2022    MPV 9 4 05/03/2022    NRBC 0 05/03/2022     CMP:   Lab Results   Component Value Date    SODIUM 140 05/03/2022     05/03/2022    CO2 22 05/03/2022    BUN 9 05/03/2022    CREATININE 0 63 05/03/2022    CALCIUM 6 8 (L) 05/03/2022    AST 12 05/02/2022    ALT 20 05/02/2022    ALKPHOS 84 05/02/2022    EGFR 78 05/03/2022     Imaging Studies: I have personally reviewed pertinent films in PACS   CT scan chest abdomen pelvis- severe multilevel DDD, compression deformity of L3 and L4, severe left hip osteoarthritis, moderate right hip osteoarthritis,     Code Status: Level 3 - DNAR and DNI  Advance Directive and Living Will:      Power of :    POLST:

## 2022-05-03 NOTE — PLAN OF CARE
Problem: PAIN - ADULT  Goal: Verbalizes/displays adequate comfort level or baseline comfort level  Description: Interventions:  - Encourage patient to monitor pain and request assistance  - Assess pain using appropriate pain scale  - Administer analgesics based on type and severity of pain and evaluate response  - Implement non-pharmacological measures as appropriate and evaluate response  - Consider cultural and social influences on pain and pain management  - Notify physician/advanced practitioner if interventions unsuccessful or patient reports new pain  Outcome: Progressing     Problem: INFECTION - ADULT  Goal: Absence or prevention of progression during hospitalization  Description: INTERVENTIONS:  - Assess and monitor for signs and symptoms of infection  - Monitor lab/diagnostic results  - Monitor all insertion sites, i e  indwelling lines, tubes, and drains  - Monitor endotracheal if appropriate and nasal secretions for changes in amount and color  - [place] appropriate cooling/warming therapies per order  - Administer medications as ordered  - Instruct and encourage patient and family to use good hand hygiene technique  - Identify and instruct in appropriate isolation precautions for identified infection/condition  Outcome: Progressing  Goal: Absence of fever/infection during neutropenic period  Description: INTERVENTIONS:  - Monitor WBC    Outcome: Progressing     Problem: DISCHARGE PLANNING  Goal: Discharge to home or other facility with appropriate resources  Description: INTERVENTIONS:  - Identify barriers to discharge w/patient and caregiver  - Arrange for needed discharge resources and transportation as appropriate  - Identify discharge learning needs (meds, wound care, etc )  - Arrange for interpretive services to assist at discharge as needed  - Refer to Case Management Department for coordinating discharge planning if the patient needs post-hospital services based on physician/advanced practitioner order or complex needs related to functional status, cognitive ability, or social support system  Outcome: Progressing     Problem: Knowledge Deficit  Goal: Patient/family/caregiver demonstrates understanding of disease process, treatment plan, medications, and discharge instructions  Description: Complete learning assessment and assess knowledge base    Interventions:  - Provide teaching at level of understanding  - Provide teaching via preferred learning methods  Outcome: Progressing

## 2022-05-03 NOTE — QUICK NOTE
I attempted to call the patient's son back at 4pm as he requested during our phone call earlier earlier however no one answered  Discussed with admitting physician this afternoon who informed me that patient's son brought in med list last night with HCTZ on it  Will need to confirm with patient's son tomorrow if patient was taking HCTZ prior to admission

## 2022-05-04 ENCOUNTER — APPOINTMENT (INPATIENT)
Dept: RADIOLOGY | Facility: HOSPITAL | Age: 87
DRG: 872 | End: 2022-05-04
Payer: MEDICARE

## 2022-05-04 PROBLEM — E11.9 TYPE 2 DIABETES MELLITUS, WITHOUT LONG-TERM CURRENT USE OF INSULIN (HCC): Status: ACTIVE | Noted: 2018-12-11

## 2022-05-04 LAB
ANION GAP SERPL CALCULATED.3IONS-SCNC: 11 MMOL/L (ref 4–13)
BUN SERPL-MCNC: 11 MG/DL (ref 5–25)
CALCIUM SERPL-MCNC: 9.1 MG/DL (ref 8.3–10.1)
CHLORIDE SERPL-SCNC: 104 MMOL/L (ref 100–108)
CO2 SERPL-SCNC: 22 MMOL/L (ref 21–32)
CREAT SERPL-MCNC: 0.68 MG/DL (ref 0.6–1.3)
EST. AVERAGE GLUCOSE BLD GHB EST-MCNC: 272 MG/DL
GFR SERPL CREATININE-BSD FRML MDRD: 76 ML/MIN/1.73SQ M
GLUCOSE SERPL-MCNC: 166 MG/DL (ref 65–140)
GLUCOSE SERPL-MCNC: 208 MG/DL (ref 65–140)
GLUCOSE SERPL-MCNC: 212 MG/DL (ref 65–140)
GLUCOSE SERPL-MCNC: 215 MG/DL (ref 65–140)
GLUCOSE SERPL-MCNC: 216 MG/DL (ref 65–140)
HBA1C MFR BLD: 11.1 %
MAGNESIUM SERPL-MCNC: 2.3 MG/DL (ref 1.6–2.6)
PHOSPHATE SERPL-MCNC: 2.6 MG/DL (ref 2.3–4.1)
POTASSIUM SERPL-SCNC: 4.6 MMOL/L (ref 3.5–5.3)
SODIUM SERPL-SCNC: 137 MMOL/L (ref 136–145)

## 2022-05-04 PROCEDURE — 72100 X-RAY EXAM L-S SPINE 2/3 VWS: CPT

## 2022-05-04 PROCEDURE — 99232 SBSQ HOSP IP/OBS MODERATE 35: CPT | Performed by: INTERNAL MEDICINE

## 2022-05-04 PROCEDURE — 82948 REAGENT STRIP/BLOOD GLUCOSE: CPT

## 2022-05-04 PROCEDURE — 83036 HEMOGLOBIN GLYCOSYLATED A1C: CPT | Performed by: PHYSICIAN ASSISTANT

## 2022-05-04 PROCEDURE — 97530 THERAPEUTIC ACTIVITIES: CPT

## 2022-05-04 PROCEDURE — 97110 THERAPEUTIC EXERCISES: CPT

## 2022-05-04 PROCEDURE — 80048 BASIC METABOLIC PNL TOTAL CA: CPT | Performed by: NURSE PRACTITIONER

## 2022-05-04 PROCEDURE — 84100 ASSAY OF PHOSPHORUS: CPT | Performed by: INTERNAL MEDICINE

## 2022-05-04 PROCEDURE — 99232 SBSQ HOSP IP/OBS MODERATE 35: CPT | Performed by: PHYSICIAN ASSISTANT

## 2022-05-04 PROCEDURE — 83735 ASSAY OF MAGNESIUM: CPT | Performed by: NURSE PRACTITIONER

## 2022-05-04 PROCEDURE — 97760 ORTHOTIC MGMT&TRAING 1ST ENC: CPT

## 2022-05-04 RX ADMIN — LIDOCAINE 1 PATCH: 50 PATCH CUTANEOUS at 08:40

## 2022-05-04 RX ADMIN — ATORVASTATIN CALCIUM 40 MG: 40 TABLET, FILM COATED ORAL at 17:22

## 2022-05-04 RX ADMIN — DICLOFENAC SODIUM 2 G: 10 GEL TOPICAL at 17:23

## 2022-05-04 RX ADMIN — INSULIN LISPRO 1 UNITS: 100 INJECTION, SOLUTION INTRAVENOUS; SUBCUTANEOUS at 17:22

## 2022-05-04 RX ADMIN — INSULIN LISPRO 1 UNITS: 100 INJECTION, SOLUTION INTRAVENOUS; SUBCUTANEOUS at 22:11

## 2022-05-04 RX ADMIN — INSULIN LISPRO 1 UNITS: 100 INJECTION, SOLUTION INTRAVENOUS; SUBCUTANEOUS at 11:23

## 2022-05-04 RX ADMIN — DICLOFENAC SODIUM 2 G: 10 GEL TOPICAL at 08:41

## 2022-05-04 RX ADMIN — ENOXAPARIN SODIUM 30 MG: 30 INJECTION, SOLUTION INTRAVENOUS; SUBCUTANEOUS at 08:40

## 2022-05-04 RX ADMIN — ASPIRIN 81 MG CHEWABLE TABLET 81 MG: 81 TABLET CHEWABLE at 08:40

## 2022-05-04 RX ADMIN — DICLOFENAC SODIUM 2 G: 10 GEL TOPICAL at 11:25

## 2022-05-04 RX ADMIN — AMLODIPINE BESYLATE 5 MG: 5 TABLET ORAL at 17:22

## 2022-05-04 RX ADMIN — INSULIN LISPRO 1 UNITS: 100 INJECTION, SOLUTION INTRAVENOUS; SUBCUTANEOUS at 08:40

## 2022-05-04 RX ADMIN — CLOPIDOGREL BISULFATE 75 MG: 75 TABLET ORAL at 08:40

## 2022-05-04 RX ADMIN — PANTOPRAZOLE SODIUM 20 MG: 20 TABLET, DELAYED RELEASE ORAL at 05:22

## 2022-05-04 RX ADMIN — CEFTRIAXONE SODIUM 1000 MG: 10 INJECTION, POWDER, FOR SOLUTION INTRAVENOUS at 18:05

## 2022-05-04 RX ADMIN — DICLOFENAC SODIUM 2 G: 10 GEL TOPICAL at 22:12

## 2022-05-04 NOTE — TELEMEDICINE
On-Call Telephone Note    [facility] by [employer] [facility] [address] [email or web address]  Maksim Richardson 80 y o  female MRN: [id number]  Unit/Bed#: E5 -01 Encounter: [id number]    Per provider report, patient presents on 05/02/2022 with back pain  Reports no falls or inciting events  Pain has progressed over the past few days with radiation into left greater than right leg  She also notes paresthesias in bilateral lower extremities but denies any bowel or bladder incontinence or saddle anesthesia  She is found to have acute L5 compression fracture as well as indeterminate marrow lesion at T10  She has past medical history significant for hypertension, TIA, diabetes, GERD, wheelchair-bound at baseline with some ability to perform transfers  She has also apparently poor historian  Available past medical history,social history, surgical history, medication list, drug allergies and review of systems were reviewed  /76   Pulse 84   Temp 99 3 °F (37 4 °C)   Resp 18   Ht 5' 2 5" (1 588 m)   Wt 58 2 kg (128 lb 6 4 oz)   SpO2 93%   BMI 23 11 kg/m²      Clinical exam per provider report:  "Bilateral lower extremities:  Inspection- no ecchymosis, erythema, or abrasions  Palpation- no TTP  Thighs and calves soft and compressible  Strength- left leg 1/5 EHL, 3/5 AT, 5/5 GS, 5/5 quad, 5/5 hamstring, 5/5 hip flexors, 5/5 hip adductors, and 5/5 hip abductors  Right leg 5/5 EHL, AT, GS, quad, hip flexors, hip adductors, hip abductors  Neurovascular- sensation intact L4- S1  Neg babinski no clonus  Palpable pedal pulses "    Imaging personally reviewed  · MRI lumbar spine without contrast 05/03/2022:  Acute fracture of the L5 vertebra with mild bony retropulsion of the superior endplate into the spinal canal   No epidural hematoma but there is severe spinal stenosis at the L4-5 intervertebral disc space  Indeterminate marrow lesion in the T10 vertebra    Both benign and malignant (solitary osseous metastases) etiologies are in the differential diagnosis  Further clinical assessment advised  Chronic L3 and L4 compression deformities  Correlation for osteoporotic risk factors recommended  Assessment and Plan  3 72-year-old female with acute L5 compression fracture as well as marrow lesion at T10   2  Given patient's age and comorbidities (wheelchair bound at baseline with only some ability to do transfers) unlikely to offer any surgical intervention  Surgical intervention for decompression of fracture would involve hardware placement and it is unlikely she would be able to withstand surgery such as this nor would bone quality be appropriate for hardware placement  3  For now will move forward with conservative management including LSO brace when out of bed and head of bed greater than 45°  4  She is to obtain upright lumbar spine x-rays with AP and lateral views while wearing brace   5  No bending, twisting or lifting more than 10 lb, no driving  6  She should have serial PVRs to ensure patient is not retaining urine   7  Will review imaging once completed and discuss case with attending in the morning  8  Please call with questions or concerns  All questions answered  Provider is in agreement with the course of action  A total of 15 minutes was spent discussing the presentation, physical exam and formulating a management plan with the provider

## 2022-05-04 NOTE — PHYSICAL THERAPY NOTE
PHYSICAL THERAPY NOTE  09:25-09:38 ( 13 minutes)          Patient Name: Alyssa Cook Date: 5/4/2022 05/04/22 6688   PT Last Visit   PT Visit Date 05/04/22   Note Type   Note Type Orthotic Management/Training  (treatment)   Type of Brace   Brace Applied [employer] 456 Back Pack TLSO   Patient Position When Brace Applied Supine   Education   Education Provided Yes   End of Consult   Patient Position at End of Consult Supine   Pain Assessment   Pain Location/Orientation Location: Back;Orientation: Left; Location: Leg   Pain Rating: FLACC (Rest) - Face 1   Pain Rating: FLACC (Rest) - Legs 1   Pain Rating: FLACC (Rest) - Activity 1   Pain Rating: FLACC (Rest) - Cry 1   Pain Rating: FLACC (Rest) - Consolability 1   Score: FLACC (Rest) 5   Pain Rating: FLACC (Activity) - Face 1   Pain Rating: FLACC (Activity) - Legs 1   Pain Rating: FLACC (Activity) - Activity 1   Pain Rating: FLACC (Activity) - Cry 2   Pain Rating: FLACC (Activity) - Consolability 1   Score: FLACC (Activity) 6   Precautions   Spinal Precautions Compression fx   Restrictions/Precautions   Weight Bearing Precautions Per Order No   Other Precautions Cognitive; Chair Alarm; Bed Alarm; Fall Risk;Pain;Hard of hearing   General   Chart Reviewed Yes   Additional Pertinent History TLSO ordered for comfort with multiple comp fx  MRI pending  Response to Previous Treatment Patient reporting fatigue but able to participate  Cognition   Overall Cognitive Status Impaired   Subjective   Subjective "Not sure if brace helps"  In a lot of pain  Bed Mobility   Rolling L 4  Minimal assistance   Additional items Assist x 1; Increased time required; Bedrails;HOB elevated   Supine to Sit 3  Moderate assistance   Additional items Assist x 2; Increased time required;Verbal cues;LE management; Bedrails;HOB elevated   Sit to Supine 2  Maximal assistance   Additional items Assist x 2;Increased time required;Verbal cues;LE management   Additional Comments Brace in place in supine as fit by restorative specialist   PT present to assist wtih further brace assessment and tolerance with use of brace and mobility  Backpack TLSO in place  Performed supine to sit, brace adjusted in seated  Doffed in seated and returned to supine and repositioned  May trial additional TLSO to assist with pain  Transfers   Additional Comments Unable to complete at this time 2* pain   Balance   Static Sitting Fair -   Dynamic Sitting Poor +   Endurance Deficit   Endurance Deficit Yes   Endurance Deficit Description pain, fatigue  Activity Tolerance   Activity Tolerance Patient limited by fatigue;Patient limited by pain   Medical Staff Made Aware Restorative Technician Kristine present for session  Nurse Made Aware NurseNasir  Assessment   Prognosis Fair   Problem List Decreased strength;Decreased range of motion;Decreased endurance; Impaired balance;Decreased mobility; Decreased cognition; Impaired judgement;Decreased safety awareness;Orthopedic restrictions;Pain   Assessment Seen in conjunction with restorative technician to assess with TLSO brace  Backpack TLSO in place  Transitioned supine to sit with mod A of 2  Once seated, bracing adjustments made  Pain unchanged  Unable to progress with mobility given pain, declining attempt to stand  Doffed TLSO and returned to supine with max A of 2 and repositioned for comfort  Difficulty with removing brace given shoulder ROM limitations  May trial [facility] TLSO next session for improved use and donning/doffing brace  Will continue to monitor POC, medical course  The patient's AM-PAC Basic Mobility Inpatient Short Form Raw Score is 8  A Raw score of less than or equal to 16 suggests the patient may benefit from discharge to post-acute rehabilitation services   Please also refer to the recommendation of the Physical Therapist for safe discharge planning  Will benefit from rehab at d/c given mobility impairments  Barriers to Discharge Decreased caregiver support   Goals   Patient Goals have less pain   STG Expiration Date 05/17/22   PT Treatment Day 1   Plan   Treatment/Interventions Functional transfer training;LE strengthening/ROM; Therapeutic exercise; Endurance training;Patient/family training;Equipment eval/education; Bed mobility;Gait training; Compensatory technique education;Continued evaluation;Spoke to nursing   Progress Slow progress, decreased activity tolerance   PT Frequency 3-5x/wk   Recommendation   PT Discharge Recommendation Post acute rehabilitation services   AM-PAC Basic Mobility Inpatient   Turning in Bed Without Bedrails 3   Lying on Back to Sitting on Edge of Flat Bed 1   Moving Bed to Chair 1   Standing Up From Chair 1   Walk in Room 1   Climb 3-5 Stairs 1   Basic Mobility Inpatient Raw Score 8   Highest Level Of Mobility   JH-HLM Goal 3: Sit at edge of bed   JH-HLM Highest Level of Mobility 3: Sit at edge of bed   JH-HLM Goal Achieved Yes   Education   Education Provided Mobility training; Other  (brace)   Patient Reinforcement needed   End of Consult   Patient Position at End of Consult Supine;Bed/Chair alarm activated; All needs within reach  (pillow under knees )   End of Consult Comments stable condition      Rashawn Thompson, PT

## 2022-05-04 NOTE — RESTORATIVE TECHNICIAN NOTE
Restorative Technician Note      Patient Name: Trevor Mohan     Restorative Tech Visit Date: 05/04/22  Note Type: Bracing, Initial consult  Patient Position Upon Consult: Supine  Brace Applied: [employer] 456 Back Pack TLSO  Additional Brace Ordered: No  Patient Position When Brace Applied: Seated  Education Provided: Yes  Patient Position at End of Consult: Supine; All needs within reach  Nurse Communication: Nurse aware of consult, application of brace      If you have any questions, please contact the Rehabilitation Department

## 2022-05-04 NOTE — PLAN OF CARE
Problem: PHYSICAL THERAPY ADULT  Goal: Performs mobility at highest level of function for planned discharge setting  See evaluation for individualized goals  Description: Treatment/Interventions: Functional transfer training,LE strengthening/ROM,Therapeutic exercise,Cognitive reorientation,Patient/family training,Equipment eval/education,Bed mobility,Gait training,Compensatory technique education,Continued evaluation,Spoke to nursing,OT          See flowsheet documentation for full assessment, interventions and recommendations  Outcome: Progressing  Note: Prognosis: Fair  Problem List: Decreased strength,Decreased range of motion,Decreased endurance,Impaired balance,Decreased mobility,Decreased cognition,Impaired judgement,Decreased safety awareness,Orthopedic restrictions,Pain  Assessment: Seen in conjunction with restorative technician to assess with TLSO brace  Backpack TLSO in place  Transitioned supine to sit with mod A of 2  Once seated, bracing adjustments made  Pain unchanged  Unable to progress with mobility given pain, declining attempt to stand  Doffed TLSO and returned to supine with max A of 2 and repositioned for comfort  Difficulty with removing brace given shoulder ROM limitations  May trial [facility] TLSO next session for improved use and donning/doffing brace  Will continue to monitor POC, medical course  The patient's AM-PAC Basic Mobility Inpatient Short Form Raw Score is 8  A Raw score of less than or equal to 16 suggests the patient may benefit from discharge to post-acute rehabilitation services  Please also refer to the recommendation of the Physical Therapist for safe discharge planning  Will benefit from rehab at d/c given mobility impairments  Barriers to Discharge: Decreased caregiver support        PT Discharge Recommendation: Post acute rehabilitation services          See flowsheet documentation for full assessment

## 2022-05-04 NOTE — PLAN OF CARE
Problem: PHYSICAL THERAPY ADULT  Goal: Performs mobility at highest level of function for planned discharge setting  See evaluation for individualized goals  Description: Treatment/Interventions: Functional transfer training,LE strengthening/ROM,Therapeutic exercise,Cognitive reorientation,Patient/family training,Equipment eval/education,Bed mobility,Gait training,Compensatory technique education,Continued evaluation,Spoke to nursing,OT          See flowsheet documentation for full assessment, interventions and recommendations  Outcome: Progressing  Note: Prognosis: Fair  Problem List: Decreased endurance,Decreased mobility,Decreased range of motion,Pain  Assessment: Pt  reported increased pain and noted difficulty mobilizing LLE compared to the right  Attempted supine to sit sit transfer with log roll however unable to complete it with max A x 1 as patient reported she didnt want to do it due to pain and needed to lay back down  pt  positioned back in bed at the end of session  pt  reported she was comfortable staying in bed  All needs within reach  Will continue to follow per current PT POC  Pt  limited in mobiity due to increased pain  Continue to recommend rehab at [place] as patient is below her PLOF and needs extensive assist for mobility  Barriers to Discharge: Decreased caregiver support        PT Discharge Recommendation: Post acute rehabilitation services          See flowsheet documentation for full assessment

## 2022-05-04 NOTE — ASSESSMENT & PLAN NOTE
· Glucose noted to be 334 on admission BMP  · Updated HhA1c (was 5 8 in Dec  2018)  · Hemoglobin A1c here 11 1  · Blood sugars here 216, 215, 212, 166  · Currently on insulin sliding scale

## 2022-05-04 NOTE — ASSESSMENT & PLAN NOTE
Results from last 7 days   Lab Units 05/04/22  0514 05/03/22  1618 05/03/22  0524 05/02/22  1526   SODIUM mmol/L 137 137 140 130*   · Presenting Na of 130  · Seen and evaluated by nephrology here  · Likely prerenal, increased ADH secondary to pain, hyperglycemia  · Hold further IV fluids

## 2022-05-04 NOTE — ASSESSMENT & PLAN NOTE
· POA as evidenced by leukocytosis and tachycardia (also note low grade, 100 1, temp)   · Source- possible urinary tract infection    · Received IV ceftriaxone x2 days  · Urine culture growing mixed contaminants -will stop antibiotics now  · Leukocytosis has resolved   · Blood cultures negative at 24 hrs

## 2022-05-04 NOTE — PHYSICAL THERAPY NOTE
Physical Therapy Treatment Note     05/04/22 1314   PT Last Visit   PT Visit Date 05/04/22   Note Type   Note Type Treatment   Pain Assessment   Pain Assessment Tool 0-10   Pain Score 10 - Worst Possible Pain   Pain Location/Orientation Location: Back   Restrictions/Precautions   Weight Bearing Precautions Per Order No   Other Precautions Bed Alarm;Spinal precautions; Fall Risk;Pain   General   Chart Reviewed Yes   Subjective   Subjective Pt  in bed upon entry  Agreeable to attempt PT  Bed Mobility   Supine to Sit 2  Maximal assistance   Additional items Assist x 1; Increased time required;LE management; Bedrails;Verbal cues   Sit to Supine 2  Maximal assistance   Additional items Assist x 1; Increased time required;LE management;Verbal cues   Endurance Deficit   Endurance Deficit Yes   Endurance Deficit Description pain   Activity Tolerance   Activity Tolerance Patient limited by pain   Nurse Made Aware Yes   Exercises   THR Supine;Bilateral;AAROM;AROM;5 reps;10 reps   Assessment   Prognosis Fair   Problem List Decreased endurance;Decreased mobility; Decreased range of motion;Pain   Assessment Pt  reported increased pain and noted difficulty mobilizing LLE compared to the right  Attempted supine to sit sit transfer with log roll however unable to complete it with max A x 1 as patient reported she didnt want to do it due to pain and needed to lay back down  pt  positioned back in bed at the end of session  pt  reported she was comfortable staying in bed  All needs within reach  Will continue to follow per current PT POC  Pt  limited in mobiity due to increased pain  Continue to recommend rehab at [place] as patient is below her PLOF and needs extensive assist for mobility  Barriers to Discharge Decreased caregiver support   Goals   Patient Goals I want to take a nap   STG Expiration Date 05/17/22   PT Treatment Day 2   Plan   Treatment/Interventions Functional transfer training;LE strengthening/ROM; Therapeutic exercise;Patient/family training;Bed mobility;Spoke to nursing   Progress Slow progress, decreased activity tolerance   PT Frequency 3-5x/wk   Recommendation   PT Discharge Recommendation Post acute rehabilitation services   AM-PAC Basic Mobility Inpatient   Turning in Bed Without Bedrails 3   Lying on Back to Sitting on Edge of Flat Bed 1   Moving Bed to Chair 1   Standing Up From Chair 1   Walk in Room 1   Climb 3-5 Stairs 1   Basic Mobility Inpatient Raw Score 8   Turning Head Towards Sound 4   Follow Simple Instructions 3   Low Function Basic Mobility Raw Score 15   Low Function Basic Mobility Standardized Score 23 9   Highest Level Of Mobility   -HL Goal 3: Sit at edge of bed   JH-HLM Highest Level of Mobility 4: Move to chair/commode   JH-HLM Goal Achieved No         Charlotte Quezada PTA    An AM-PAC basic mobility standardized score less than 42 9 suggest the patient may benefit from discharge to post-acute rehab services

## 2022-05-04 NOTE — CASE MANAGEMENT
Case Management Discharge Planning Note    Patient name Fer Hernandez  Location [facility] [address] [facility] [phone]-* MRN [id number]  : 1929 Date 2022       Current Admission Date: 2022  Current Admission Diagnosis:Sepsis due to urinary tract infection [facility])   Patient Active Problem List    Diagnosis Date Noted    Sepsis due to urinary tract infection ([facility] ) 2022    Hyponatremia 2022    Hypokalemia 2022    Chronic midline low back pain without sciatica 2022    Abnormal CT scan, sinus 10/01/2019    Sinus bradycardia 10/01/2019    Closed fracture of fourth metatarsal bone 2019    Essential hypertension 2019    Premature atrial beats 2019    Type 2 diabetes mellitus with complication, with long-term [facility]    Microscopic hematuria 2019    Pancreatic lesion 2019    Abnormal urine cytology 2019    Chronic renal failure 2019    Osteopenia 2019    Physical deconditioning 2018    Abnormality of gait 2018    Hyperglycemia 2018    Transient cerebral ischemia 12/10/2018    Hypertensive urgency 12/10/2018    Gastroesophageal reflux 12/10/2018      LOS (days): 2  Geometric Mean LOS (GMLOS) (days): 3 50  Days to GMLOS:1 7     OBJECTIVE:  Risk of Unplanned Readmission Score: 13         Current admission status: Inpatient   Preferred Pharmacy:   [address] [place] [address] Drive  Phone: [phone] Fax: [phone]    Primary Care Provider: No primary care provider on file  Primary Insurance: MEDICARE  Secondary Insurance:     DISCHARGE DETAILS:    PT/OT recommending rehab  Patient's son Shasha Salas is agreeable to rehab and referral to Son rehab  Referral made and pending to Son at this time

## 2022-05-04 NOTE — PROGRESS NOTES
NEPHROLOGY PROGRESS NOTE   Milli Oneal 80 y o  female MRN: [id number]  Unit/Bed#: E5 -01 Encounter: [id number]      24hr EVENTS:  -potassium improved to 4 6 most recently, sodium stable 137  ASSESSMENT/PLAN:    Hyponatremia (resolved)  -sodium 130 on admission in the set of hyperglycemia/glucose corrected to 134, repeat this morning 140/glucose corrected to 141  -Most recent sodium 137 (138 glucose corrected)  -Etiology possible 2/2 to HCTZ as noted below vs  Low solute intake  -received 1 L 0 9% NSS on admission and maintainance fluid overnight on admission, now stopped  -there is a concern that this is HCTZ induced however I do not see HCTZ listed on her medication list  Per review of internal medicine note it appears patient was possible taking HCTZ but no medication list is available currently, would hold HCTZ for now    Hypokalemia (resolved)  -Unclear etiology, patients reports poor oral appetite, per family the nursing facility has indicated she does not eat well  Possible 2/2 hypomagnesemia  -potassium 2 9 on admission, now 4 6 after oral repletion, continue to trend daily via BMP    Hypomagnesemia (resolved)  -likely contributing to hypokalemia, Mag was 1 8 initially on admission, given 2gm mag IV, repeat level today 2 3  -trend Mag level daily     Hypertension  - home medications include Norvasc 5 mg daily, currently continue while inpatient with hold parameters  -blood pressure trends are labile, but acceptable     Back pain  -PTOT consult, pain management per primary  -Ortho consulted, MRI with acute L5 fracture, management per ortho     Sepsis/UTI  -WBCs 15 on admission, UA with elevated leukocytes, innumerable bacteria, 10-20 wbc's    Urine and blood cultures pending  -started on ceftriaxone by admitting team     Hyperglycemia  -POC glucose elvated on admission  -A1c 5 8 (2018)  -Not on home medications  -On SSI per primary    Dispo: Nephrology will sign off, please contact with any questions or concerns    SUBJECTIVE:  "Im ok" reports ok appetite, denies dysuria     ROS:  Review of Systems   Constitutional: Positive for fatigue  Negative for chills, diaphoresis and fever  Respiratory: Negative for shortness of breath  Cardiovascular: Negative for chest pain and leg swelling  Gastrointestinal: Negative  Musculoskeletal: Positive for back pain  Neurological: Negative  All other systems reviewed and are negative  OBJECTIVE:  Current Weight: Weight - Scale: 58 2 kg (128 lb 6 4 oz)  Vitals:    05/03/22 0724 05/03/22 2317 05/04/22 0612 05/04/22 0729   BP: 140/70 114/61 142/70 160/86   BP Location: Left arm Left arm     Pulse: 82 67 73 84   Resp: 18 16 18    Temp: 99 °F (37 2 °C) 98 8 °F (37 1 °C) 98 1 °F (36 7 °C) 99 5 °F (37 5 °C)   TempSrc: Oral Oral Oral    SpO2: 92% 94% 95% 92%   Weight:       Height:           Intake/Output Summary (Last 24 hours) at 5/4/2022 1015  Last data filed at [address] [place] [address] [id number]  Gross per 24 hour   Intake 120 ml   Output 575 ml   Net -455 ml     Physical Exam  Vitals and nursing note reviewed  Constitutional:       General: She is not in acute distress  Appearance: Normal appearance  She is normal weight  She is not ill-appearing, toxic-appearing or diaphoretic  HENT:      Head: Normocephalic and atraumatic  Mouth/Throat:      Mouth: Mucous membranes are moist    Eyes:      General: No scleral icterus  Cardiovascular:      Rate and Rhythm: Normal rate and regular rhythm  Pulses: Normal pulses  Heart sounds: Normal heart sounds  Pulmonary:      Effort: Pulmonary effort is normal  No respiratory distress  Breath sounds: No wheezing  Abdominal:      General: Abdomen is flat  There is no distension  Palpations: Abdomen is soft  Tenderness: There is no abdominal tenderness  Musculoskeletal:      Cervical back: Neck supple  Right lower leg: No edema  Left lower leg: No edema     Skin:     General: Skin is warm and dry  Capillary Refill: Capillary refill takes less than 2 seconds  Neurological:      Mental Status: She is alert and oriented to person, place, and time     Psychiatric:         Mood and Affect: Mood normal             Medications:    Current Facility-Administered Medications:     acetaminophen (TYLENOL) tablet 325 mg, 325 mg, Oral, Q6H PRN, Abelardo Franklin MD    amLODIPine (NORVASC) tablet 5 mg, 5 mg, Oral, After [place] Tate, NICOLASNP, 5 mg at 05/03/22 1722    aspirin chewable tablet 81 mg, 81 mg, Oral, Daily, Abelardo Franklin MD, 81 mg at 05/04/22 0840    atorvastatin (LIPITOR) tablet 40 mg, 40 mg, Oral, QPM, Abelardo Franklin MD, 40 mg at 05/03/22 1722    bisacodyl (DULCOLAX) EC tablet 10 mg, 10 mg, Oral, Daily PRN, Abelardo Franklin MD    ceftriaxone (ROCEPHIN) 1 g/50 mL in dextrose IVPB, 1,000 mg, Intravenous, Q24H, Abelardo Franklin MD, Last Rate: 100 mL/hr at 05/03/22 1721, 1,000 mg at 05/03/22 1721    clopidogrel (PLAVIX) tablet 75 mg, 75 mg, Oral, Daily, Abelardo Franklin MD, 75 mg at 05/04/22 0840    Diclofenac Sodium (VOLTAREN) 1 % topical gel 2 g, 2 g, Topical, 4x Daily, Abelardo Franklin MD, 2 g at 05/04/22 0841    enoxaparin (LOVENOX) subcutaneous injection 30 mg, 30 mg, Subcutaneous, Daily, Abelardo Franklin MD, 30 mg at 05/04/22 0840    HYDROcodone-acetaminophen ([facility]) 5-325 mg per tablet 1 tablet, 1 tablet, Oral, Q6H PRN, Abelardo Franklin MD, 1 tablet at 05/03/22 2031    insulin lispro (HumaLOG) 100 units/mL subcutaneous injection 1-5 Units, 1-5 Units, Subcutaneous, TID AC, 1 Units at 05/04/22 0840 **AND** Fingerstick Glucose (POCT), , , TID AC, Mauricio Helm PA-C    insulin lispro (HumaLOG) 100 units/mL subcutaneous injection 1-5 Units, 1-5 Units, Subcutaneous, HS, Mauricio Helm, PAIvanC, 1 Units at 05/03/22 0403    lidocaine (LIDODERM) 5 % patch 1 patch, 1 patch, Topical, Daily, Tejas Bourgeois MD, 1 patch at 05/04/22 0840    pantoprazole (PROTONIX) EC tablet 20 mg, 20 mg, Oral, Daily Before Breakfast, Tejas Bourgeois MD, 20 mg at 05/04/22 0522    Laboratory Results:  Results from last 7 days   Lab Units 05/04/22  0514 05/03/22  1618 05/03/22  0524 05/02/22  1703 05/02/22  1526   WBC Thousand/uL  --   --  9 79  --  15 31*   HEMOGLOBIN g/dL  --   --  12 3  --  16 3*   HEMATOCRIT %  --   --  37 2  --  44 3   PLATELETS Thousands/uL  --   --  238  --  322   POTASSIUM mmol/L 4 6 4 0 2 8*  --  2 9*   CHLORIDE mmol/L 104 101 108  --  91*   CO2 mmol/L 22 24 22  --  25   BUN mg/dL 11 8 9  --  16   CREATININE mg/dL 0 68 0 55* 0 63  --  0 98   CALCIUM mg/dL 9 1 9 3 6 8*  --  9 6   MAGNESIUM mg/dL 2 3  --   --  1 8  --    PHOSPHORUS mg/dL 2 6  --   --   --   --        I have personally reviewed the blood work as stated above and in my note  I have personally reviewed MRI lumbar spine imaging studies  I have personally reviewed internal medicine and orthopedics note

## 2022-05-04 NOTE — PROGRESS NOTES
[address] [place] [address]  Progress Note Stanley Shelton 8/7/1929, 80 y o  female MRN: [id number]  Unit/Bed#: E5 -01 Encounter: [id number]  Primary Care Provider: No primary care provider on file  Date and time admitted to hospital: 5/2/2022  2:33 PM    * Chronic midline low back pain without sciatica  Assessment & Plan  · Presented with the complaint of back pain on 5/2/22 from [facility]  · Acute on chronic low back pain secondary to lumbar degenerative disc disease and severe bilateral hip arthritis  · Seen and evaluated by ortho who ordered MRI lumbar spine given significant changes on CT    · MRI lumbar spine:  Acute fracture of L5 vertebrae with mild bony retropulsion of superior endplate into the spinal canal   No epidural hematoma but severe spinal stenosis at L4-L5 intervertebral disc space  Indeterminate marrow lesion in T10 vertebrae- both benign and malignant etiologies are in differential   Further clinical assessment advised  Chronic L3 and L4 compression deformities      · Consulted Neuro surgery for evaluation with the following recommendations:   -given poor baseline functional status, continue with conservative management   -place TSLO brace now - wear when out of bed and when head of bed is >45 degrees   -obtain x-ray lumbar spine AP and lateral views sitting upright with brace on   -check serial post void residuals to make sure pt is not retaining   -will followup with neurosurgery tomorrow for final recommendations   -findings discussed with son HONG- who is POA via telephone    Hypokalemia  Assessment & Plan  · Unclear etiology - K as low as 2 6   · Repleted and now normalized  · Mag within normal limits    Hyponatremia  Assessment & Plan  Results from last 7 days   Lab Units 05/04/22  0514 05/03/22  1618 05/03/22  0524 05/02/22  1526   SODIUM mmol/L 137 137 140 130*   · Presenting Na of 130  · Seen and evaluated by nephrology here  · Likely prerenal, increased ADH secondary to pain, hyperglycemia  · Hold further IV fluids    Sepsis due to urinary tract infection ([address] )  Assessment & Plan  · POA as evidenced by leukocytosis and tachycardia (also note low grade, 100 1, temp)   · Source- possible urinary tract infection  · Received IV ceftriaxone x2 days  · Urine culture growing mixed contaminants -will stop antibiotics now  · Leukocytosis has resolved   · Blood cultures negative at 24 hrs    Essential hypertension  Assessment & Plan  · Continue amlodipine 5 mg daily    Type 2 diabetes mellitus, without long-term current use of insulin ([facility])  Assessment & Plan  · Glucose noted to be 334 on admission BMP  · Updated HhA1c (was 5 8 in Dec  2018)  · Hemoglobin A1c here 11 1  · Blood sugars here 216, 215, 212, 166  · Currently on insulin sliding scale      VTE Pharmacologic Prophylaxis:   Pharmacologic: Enoxaparin (Lovenox)  Mechanical VTE Prophylaxis in Place: Yes    Discharge Plan: With need for continued inpatient stay for additional imaging and final neuro surgery recommendations    Discussions with Specialists or Other Care Team Provider:  Nursing, neurosurgery AP, Dr Madan Syed    Education and Discussions with Family / Patient: patient, patients son myrtle via telephone    Time Spent for Care: 1 hour  More than 50% of total time spent on counseling and coordination of care as described above  Current Length of Stay: 2 day(s)  Current Patient Status: Inpatient   Code Status: Level 3 - DNAR and DNI    Subjective:   Seen and evaluated earlier during rounds  Pt complains of pain in her left leg  She is also mostly wheelchair-bound at baseline  She is overall poor historian given her underlying dementia  She lives at facility prior to this admission  Discussed with Billie Rodriguez who is son about MRI findings      Objective:     Vitals:   Temp (24hrs), Av 9 °F (37 2 °C), Min:98 1 °F (36 7 °C), Max:99 5 °F (37 5 °C)    Temp:  [98 1 °F (36 7 °C)-99 5 °F (37 5 °C)] 99 3 °F (37 4 °C)  HR:  [67-84] 84  Resp:  [16-18] 18  BP: (114-160)/(61-86) 148/76  SpO2:  [92 %-95 %] 93 %  Body mass index is 23 11 kg/m²  Input and Output Summary (last 24 hours): Intake/Output Summary (Last 24 hours) at 5/4/2022 1712  Last data filed at 5/4/2022 1501  Gross per 24 hour   Intake 120 ml   Output 475 ml   Net -355 ml       Physical Exam:     Physical Exam  Vitals and nursing note reviewed  Constitutional:       General: She is not in acute distress  Appearance: Normal appearance  She is normal weight  She is not ill-appearing, toxic-appearing or diaphoretic  HENT:      Head: Normocephalic and atraumatic  Eyes:      General: No scleral icterus  Cardiovascular:      Rate and Rhythm: Normal rate and regular rhythm  Pulmonary:      Effort: Pulmonary effort is normal  No respiratory distress  Breath sounds: Normal breath sounds  No stridor  No wheezing or rhonchi  Abdominal:      General: Bowel sounds are normal  There is no distension  Palpations: Abdomen is soft  There is no mass  Tenderness: There is no abdominal tenderness  Hernia: No hernia is present  Musculoskeletal:         General: No swelling  Cervical back: Neck supple  Skin:     General: Skin is warm and dry  Neurological:      Mental Status: She is alert  Mental status is at baseline  Motor: Weakness present  Comments: Oriented to person  Poor historian given baseline dementia         Weakness to left leg   Psychiatric:         Mood and Affect: Mood normal          Behavior: Behavior normal          Additional Data:     Labs:    Results from last 7 days   Lab Units 05/03/22  0524   WBC Thousand/uL 9 79   HEMOGLOBIN g/dL 12 3   HEMATOCRIT % 37 2   PLATELETS Thousands/uL 238   NEUTROS PCT % 72   LYMPHS PCT % 18   MONOS PCT % 9   EOS PCT % 0     Results from last 7 days   Lab Units 05/04/22  0514 05/03/22  0524 05/02/22  1703   POTASSIUM mmol/L 4 6   < >  --    CHLORIDE mmol/L 104   < >  -- CO2 mmol/L 22   < >  --    BUN mg/dL 11   < >  --    CREATININE mg/dL 0 68   < >  --    CALCIUM mg/dL 9 1   < >  --    ALK PHOS U/L  --   --  84   ALT U/L  --   --  20   AST U/L  --   --  12    < > = values in this interval not displayed  Results from last 7 days   Lab Units 05/02/22  1655   INR  1 10       * I Have Reviewed All Lab Data Listed Above  * Additional Pertinent Lab Tests Reviewed: Leann 66 Admission Reviewed    Imaging:    Imaging Reports Reviewed Today Include: mri lumbar spine  Imaging Personally Reviewed by Myself Includes:      Recent Cultures (last 7 days):     Results from last 7 days   Lab Units 05/02/22  1703 05/02/22  1538   BLOOD CULTURE  No Growth at 24 hrs    No Growth at 24 hrs   --    URINE CULTURE   --  <10,000 cfu/ml        Last 24 Hours Medication List:   Current Facility-Administered Medications   Medication Dose Route Frequency Provider Last Rate    acetaminophen  325 mg Oral Q6H PRN Meera Grover MD      amLODIPine  5 mg Oral After [address] [facility]      aspirin  81 mg Oral Daily Meera Grover MD      atorvastatin  40 mg Oral QPM Meera Grover MD      bisacodyl  10 mg Oral Daily PRN Meera Grover MD      cefTRIAXone  1,000 mg Intravenous Q24H Meera Grover MD 1,000 mg (05/03/22 1721)    clopidogrel  75 mg Oral Daily Meera Grover MD      Diclofenac Sodium  2 g Topical 4x Daily Meera Grover MD      enoxaparin  30 mg Subcutaneous Daily Meera Grover MD      HYDROcodone-acetaminophen  1 tablet Oral Q6H PRN Meera Grover MD      insulin lispro  1-5 Units Subcutaneous TID [facility] Tae Garza PA-C      insulin lispro  1-5 Units Subcutaneous HS Tae Garza PA-C      lidocaine  1 patch Topical Daily Meera Grover MD      pantoprazole  20 mg Oral Daily Before [address] [facility] Nichole Duncan MD          Today, Patient Was Seen By: Allyssa Bennett PA-C    ** Please Note: This note has been constructed using a voice recognition system   **

## 2022-05-04 NOTE — PLAN OF CARE
Problem: Potential for Falls  Goal: Patient will remain free of falls  Description: INTERVENTIONS:  - Educate patient/family on patient safety including physical limitations  - Instruct patient to call for assistance with activity   - Consult OT/PT to assist with strengthening/mobility   - Keep Call bell within reach  - Keep bed low and locked with side rails adjusted as appropriate  - Keep care items and personal belongings within reach  - Initiate and maintain comfort rounds  - Make Fall Risk Sign visible to staff  - Offer Toileting every  Hours, in advance of need  - Initiate/Maintain alarm  - Obtain necessary fall risk management equipment:   - Apply yellow socks and bracelet for high fall risk patients  - Consider moving patient to room near nurses station  Outcome: Progressing     Problem: MOBILITY - ADULT  Goal: Maintain or return to baseline ADL function  Description: INTERVENTIONS:  -  Assess patient's ability to carry out ADLs; assess patient's baseline for ADL function and identify physical deficits which impact ability to perform ADLs (bathing, care of mouth/teeth, toileting, grooming, dressing, etc )  - Assess/evaluate cause of self-care deficits   - Assess range of motion  - Assess patient's mobility; develop plan if impaired  - Assess patient's need for assistive devices and provide as appropriate  - Encourage maximum independence but intervene and supervise when necessary  - Involve family in performance of ADLs  - Assess for home care needs following discharge   - Consider OT consult to assist with ADL evaluation and planning for discharge  - Provide patient education as appropriate  Outcome: Progressing  Goal: Maintains/Returns to pre admission functional level  Description: INTERVENTIONS:  - Perform BMAT or MOVE assessment daily    - Set and communicate daily mobility goal to care team and patient/family/caregiver     - Collaborate with rehabilitation services on mobility goals if consulted  - Perform Range of Motion  times a day  - Reposition patient every  hours    - Dangle patient  times a day  - Stand patient  times a day  - Ambulate patient  times a day  - Out of bed to chair  times a day   - Out of bed for meals  times a day  - Out of bed for toileting  - Record patient progress and toleration of activity level   Outcome: Progressing     Problem: PAIN - ADULT  Goal: Verbalizes/displays adequate comfort level or baseline comfort level  Description: Interventions:  - Encourage patient to monitor pain and request assistance  - Assess pain using appropriate pain scale  - Administer analgesics based on type and severity of pain and evaluate response  - Implement non-pharmacological measures as appropriate and evaluate response  - Consider cultural and social influences on pain and pain management  - Notify physician/advanced practitioner if interventions unsuccessful or patient reports new pain  Outcome: Progressing     Problem: INFECTION - ADULT  Goal: Absence or prevention of progression during hospitalization  Description: INTERVENTIONS:  - Assess and monitor for signs and symptoms of infection  - Monitor lab/diagnostic results  - Monitor all insertion sites, i e  indwelling lines, tubes, and drains  - Monitor endotracheal if appropriate and nasal secretions for changes in amount and color  - [place] appropriate cooling/warming therapies per order  - Administer medications as ordered  - Instruct and encourage patient and family to use good hand hygiene technique  - Identify and instruct in appropriate isolation precautions for identified infection/condition  Outcome: Progressing  Goal: Absence of fever/infection during neutropenic period  Description: INTERVENTIONS:  - Monitor WBC    Outcome: Progressing     Problem: SAFETY ADULT  Goal: Patient will remain free of falls  Description: INTERVENTIONS:  - Educate patient/family on patient safety including physical limitations  - Instruct patient to call for assistance with activity   - Consult OT/PT to assist with strengthening/mobility   - Keep Call bell within reach  - Keep bed low and locked with side rails adjusted as appropriate  - Keep care items and personal belongings within reach  - Initiate and maintain comfort rounds  - Make Fall Risk Sign visible to staff  - Offer Toileting every  Hours, in advance of need  - Initiate/Maintain alarm  - Obtain necessary fall risk management equipment:   - Apply yellow socks and bracelet for high fall risk patients  - Consider moving patient to room near nurses station  Outcome: Progressing  Goal: Maintain or return to baseline ADL function  Description: INTERVENTIONS:  -  Assess patient's ability to carry out ADLs; assess patient's baseline for ADL function and identify physical deficits which impact ability to perform ADLs (bathing, care of mouth/teeth, toileting, grooming, dressing, etc )  - Assess/evaluate cause of self-care deficits   - Assess range of motion  - Assess patient's mobility; develop plan if impaired  - Assess patient's need for assistive devices and provide as appropriate  - Encourage maximum independence but intervene and supervise when necessary  - Involve family in performance of ADLs  - Assess for home care needs following discharge   - Consider OT consult to assist with ADL evaluation and planning for discharge  - Provide patient education as appropriate  Outcome: Progressing  Goal: Maintains/Returns to pre admission functional level  Description: INTERVENTIONS:  - Perform BMAT or MOVE assessment daily    - Set and communicate daily mobility goal to care team and patient/family/caregiver  - Collaborate with rehabilitation services on mobility goals if consulted  - Perform Range of Motion  times a day  - Reposition patient every  hours    - Dangle patient  times a day  - Stand patient  times a day  - Ambulate patient  times a day  - Out of bed to chair  times a day   - Out of bed for meals  times a day  - Out of bed for toileting  - Record patient progress and toleration of activity level   Outcome: Progressing     Problem: DISCHARGE PLANNING  Goal: Discharge to home or other facility with appropriate resources  Description: INTERVENTIONS:  - Identify barriers to discharge w/patient and caregiver  - Arrange for needed discharge resources and transportation as appropriate  - Identify discharge learning needs (meds, wound care, etc )  - Arrange for interpretive services to assist at discharge as needed  - Refer to Case Management Department for coordinating discharge planning if the patient needs post-hospital services based on physician/advanced practitioner order or complex needs related to functional status, cognitive ability, or social support system  Outcome: Progressing     Problem: Knowledge Deficit  Goal: Patient/family/caregiver demonstrates understanding of disease process, treatment plan, medications, and discharge instructions  Description: Complete learning assessment and assess knowledge base    Interventions:  - Provide teaching at level of understanding  - Provide teaching via preferred learning methods  Outcome: Progressing     Problem: Prexisting or High Potential for Compromised Skin Integrity  Goal: Skin integrity is maintained or improved  Description: INTERVENTIONS:  - Identify patients at risk for skin breakdown  - Assess and monitor skin integrity  - Assess and monitor nutrition and hydration status  - Monitor labs   - Assess for incontinence   - Turn and reposition patient  - Assist with mobility/ambulation  - Relieve pressure over bony prominences  - Avoid friction and shearing  - Provide appropriate hygiene as needed including keeping skin clean and dry  - Evaluate need for skin moisturizer/barrier cream  - Collaborate with interdisciplinary team   - Patient/family teaching  - Consider wound care consult   Outcome: Progressing

## 2022-05-04 NOTE — ASSESSMENT & PLAN NOTE
· Presented with the complaint of back pain on 5/2/22 from [facility]  · Acute on chronic low back pain secondary to lumbar degenerative disc disease and severe bilateral hip arthritis  · Seen and evaluated by ortho who ordered MRI lumbar spine given significant changes on CT    · MRI lumbar spine:  Acute fracture of L5 vertebrae with mild bony retropulsion of superior endplate into the spinal canal   No epidural hematoma but severe spinal stenosis at L4-L5 intervertebral disc space  Indeterminate marrow lesion in T10 vertebrae- both benign and malignant etiologies are in differential   Further clinical assessment advised  Chronic L3 and L4 compression deformities      · Consulted Neuro surgery for evaluation with the following recommendations:   -given poor baseline functional status, continue with conservative management   -place TSLO brace now - wear when out of bed and when head of bed is >45 degrees   -obtain x-ray lumbar spine AP and lateral views sitting upright with brace on   -check serial post void residuals to make sure pt is not retaining   -will followup with neurosurgery tomorrow for final recommendations   -findings discussed with son HONG- who is POA via telephone

## 2022-05-05 ENCOUNTER — TELEPHONE (OUTPATIENT)
Dept: NEUROSURGERY | Facility: CLINIC | Age: 87
End: 2022-05-05

## 2022-05-05 PROBLEM — R82.90 ABNORMAL URINE: Status: ACTIVE | Noted: 2022-05-02

## 2022-05-05 LAB
ALBUMIN SERPL BCP-MCNC: 2.8 G/DL (ref 3.5–5)
ALP SERPL-CCNC: 70 U/L (ref 46–116)
ALT SERPL W P-5'-P-CCNC: 15 U/L (ref 12–78)
ANION GAP SERPL CALCULATED.3IONS-SCNC: 14 MMOL/L (ref 4–13)
AST SERPL W P-5'-P-CCNC: 15 U/L (ref 5–45)
BILIRUB SERPL-MCNC: 0.65 MG/DL (ref 0.2–1)
BUN SERPL-MCNC: 13 MG/DL (ref 5–25)
CALCIUM ALBUM COR SERPL-MCNC: 9.9 MG/DL (ref 8.3–10.1)
CALCIUM SERPL-MCNC: 8.9 MG/DL (ref 8.3–10.1)
CHLORIDE SERPL-SCNC: 102 MMOL/L (ref 100–108)
CO2 SERPL-SCNC: 21 MMOL/L (ref 21–32)
CREAT SERPL-MCNC: 0.6 MG/DL (ref 0.6–1.3)
ERYTHROCYTE [DISTWIDTH] IN BLOOD BY AUTOMATED COUNT: 13.3 % (ref 11.6–15.1)
FLUAV RNA RESP QL NAA+PROBE: NEGATIVE
FLUBV RNA RESP QL NAA+PROBE: NEGATIVE
GFR SERPL CREATININE-BSD FRML MDRD: 79 ML/MIN/1.73SQ M
GLUCOSE SERPL-MCNC: 192 MG/DL (ref 65–140)
GLUCOSE SERPL-MCNC: 201 MG/DL (ref 65–140)
GLUCOSE SERPL-MCNC: 207 MG/DL (ref 65–140)
GLUCOSE SERPL-MCNC: 277 MG/DL (ref 65–140)
GLUCOSE SERPL-MCNC: 328 MG/DL (ref 65–140)
HCT VFR BLD AUTO: 42.8 % (ref 34.8–46.1)
HGB BLD-MCNC: 14.5 G/DL (ref 11.5–15.4)
MCH RBC QN AUTO: 32.5 PG (ref 26.8–34.3)
MCHC RBC AUTO-ENTMCNC: 33.9 G/DL (ref 31.4–37.4)
MCV RBC AUTO: 96 FL (ref 82–98)
PLATELET # BLD AUTO: 300 THOUSANDS/UL (ref 149–390)
PMV BLD AUTO: 9.2 FL (ref 8.9–12.7)
POTASSIUM SERPL-SCNC: 3.8 MMOL/L (ref 3.5–5.3)
PROT SERPL-MCNC: 7.3 G/DL (ref 6.4–8.2)
RBC # BLD AUTO: 4.46 MILLION/UL (ref 3.81–5.12)
RSV RNA RESP QL NAA+PROBE: NEGATIVE
SARS-COV-2 RNA RESP QL NAA+PROBE: NEGATIVE
SODIUM SERPL-SCNC: 137 MMOL/L (ref 136–145)
WBC # BLD AUTO: 7.54 THOUSAND/UL (ref 4.31–10.16)

## 2022-05-05 PROCEDURE — 80053 COMPREHEN METABOLIC PANEL: CPT | Performed by: PHYSICIAN ASSISTANT

## 2022-05-05 PROCEDURE — 0241U HB NFCT DS VIR RESP RNA 4 TRGT: CPT | Performed by: PHYSICIAN ASSISTANT

## 2022-05-05 PROCEDURE — 97530 THERAPEUTIC ACTIVITIES: CPT

## 2022-05-05 PROCEDURE — 85027 COMPLETE CBC AUTOMATED: CPT | Performed by: PHYSICIAN ASSISTANT

## 2022-05-05 PROCEDURE — 99232 SBSQ HOSP IP/OBS MODERATE 35: CPT | Performed by: PHYSICIAN ASSISTANT

## 2022-05-05 PROCEDURE — 97116 GAIT TRAINING THERAPY: CPT

## 2022-05-05 PROCEDURE — 97110 THERAPEUTIC EXERCISES: CPT

## 2022-05-05 PROCEDURE — 82948 REAGENT STRIP/BLOOD GLUCOSE: CPT

## 2022-05-05 RX ORDER — HYDROCHLOROTHIAZIDE 12.5 MG/1
12.5 TABLET ORAL DAILY
COMMUNITY
End: 2022-05-06 | Stop reason: HOSPADM

## 2022-05-05 RX ORDER — ACETAMINOPHEN 325 MG/1
975 TABLET ORAL EVERY 8 HOURS SCHEDULED
Status: DISCONTINUED | OUTPATIENT
Start: 2022-05-05 | End: 2022-05-06 | Stop reason: HOSPADM

## 2022-05-05 RX ORDER — METHYLPREDNISOLONE 4 MG/1
8 TABLET ORAL DAILY
Status: DISCONTINUED | OUTPATIENT
Start: 2022-05-09 | End: 2022-05-06 | Stop reason: HOSPADM

## 2022-05-05 RX ORDER — METHYLPREDNISOLONE 4 MG/1
4 TABLET ORAL DAILY
Status: DISCONTINUED | OUTPATIENT
Start: 2022-05-10 | End: 2022-05-06 | Stop reason: HOSPADM

## 2022-05-05 RX ORDER — TIZANIDINE 4 MG/1
4 TABLET ORAL EVERY 8 HOURS PRN
Status: DISCONTINUED | OUTPATIENT
Start: 2022-05-05 | End: 2022-05-06 | Stop reason: HOSPADM

## 2022-05-05 RX ORDER — METHYLPREDNISOLONE 4 MG/1
12 TABLET ORAL DAILY
Status: DISCONTINUED | OUTPATIENT
Start: 2022-05-08 | End: 2022-05-06 | Stop reason: HOSPADM

## 2022-05-05 RX ORDER — INSULIN GLARGINE 100 [IU]/ML
5 INJECTION, SOLUTION SUBCUTANEOUS
Status: DISCONTINUED | OUTPATIENT
Start: 2022-05-05 | End: 2022-05-06 | Stop reason: HOSPADM

## 2022-05-05 RX ORDER — METHYLPREDNISOLONE 16 MG/1
16 TABLET ORAL DAILY
Status: DISCONTINUED | OUTPATIENT
Start: 2022-05-07 | End: 2022-05-06 | Stop reason: HOSPADM

## 2022-05-05 RX ADMIN — ATORVASTATIN CALCIUM 40 MG: 40 TABLET, FILM COATED ORAL at 17:00

## 2022-05-05 RX ADMIN — INSULIN LISPRO 1 UNITS: 100 INJECTION, SOLUTION INTRAVENOUS; SUBCUTANEOUS at 08:18

## 2022-05-05 RX ADMIN — PANTOPRAZOLE SODIUM 20 MG: 20 TABLET, DELAYED RELEASE ORAL at 06:07

## 2022-05-05 RX ADMIN — METHYLPREDNISOLONE 24 MG: 16 TABLET ORAL at 11:43

## 2022-05-05 RX ADMIN — INSULIN LISPRO 2 UNITS: 100 INJECTION, SOLUTION INTRAVENOUS; SUBCUTANEOUS at 21:37

## 2022-05-05 RX ADMIN — DICLOFENAC SODIUM 2 G: 10 GEL TOPICAL at 08:19

## 2022-05-05 RX ADMIN — AMLODIPINE BESYLATE 5 MG: 5 TABLET ORAL at 17:00

## 2022-05-05 RX ADMIN — ACETAMINOPHEN 975 MG: 325 TABLET, FILM COATED ORAL at 21:37

## 2022-05-05 RX ADMIN — INSULIN GLARGINE 5 UNITS: 100 INJECTION, SOLUTION SUBCUTANEOUS at 21:38

## 2022-05-05 RX ADMIN — ENOXAPARIN SODIUM 30 MG: 30 INJECTION, SOLUTION INTRAVENOUS; SUBCUTANEOUS at 08:19

## 2022-05-05 RX ADMIN — DICLOFENAC SODIUM 2 G: 10 GEL TOPICAL at 21:43

## 2022-05-05 RX ADMIN — LIDOCAINE 1 PATCH: 50 PATCH CUTANEOUS at 08:19

## 2022-05-05 RX ADMIN — ACETAMINOPHEN 975 MG: 325 TABLET, FILM COATED ORAL at 13:52

## 2022-05-05 RX ADMIN — DICLOFENAC SODIUM 2 G: 10 GEL TOPICAL at 11:17

## 2022-05-05 RX ADMIN — INSULIN LISPRO 3 UNITS: 100 INJECTION, SOLUTION INTRAVENOUS; SUBCUTANEOUS at 16:58

## 2022-05-05 RX ADMIN — CLOPIDOGREL BISULFATE 75 MG: 75 TABLET ORAL at 08:19

## 2022-05-05 RX ADMIN — ASPIRIN 81 MG CHEWABLE TABLET 81 MG: 81 TABLET CHEWABLE at 08:19

## 2022-05-05 RX ADMIN — DICLOFENAC SODIUM 2 G: 10 GEL TOPICAL at 17:51

## 2022-05-05 RX ADMIN — INSULIN LISPRO 1 UNITS: 100 INJECTION, SOLUTION INTRAVENOUS; SUBCUTANEOUS at 11:15

## 2022-05-05 NOTE — PROGRESS NOTES
[address] [place] [address]  Progress Note Karley Shelton 8/7/1929, 80 y o  female MRN: [id number]  Unit/Bed#: E5 -01 Encounter: [id number]  Primary Care Provider: No primary care provider on file  Date and time admitted to hospital: 5/2/2022  2:33 PM    * Chronic midline low back pain without sciatica  Assessment & Plan  · Presented with the complaint of back pain on 5/2/22 from [facility]  · Acute on chronic low back pain secondary to lumbar degenerative disc disease and severe bilateral hip arthritis  · Seen and evaluated by ortho who ordered MRI lumbar spine given significant changes on CT    · MRI lumbar spine:  Acute fracture of L5 vertebrae with mild bony retropulsion of superior endplate into the spinal canal   No epidural hematoma but severe spinal stenosis at L4-L5 intervertebral disc space  Indeterminate marrow lesion in T10 vertebrae- both benign and malignant etiologies are in differential   Further clinical assessment advised  Chronic L3 and L4 compression deformities  · Consulted Neuro surgery for evaluation with the following recommendations:   -given poor baseline functional status, continue with conservative management   -place TSLO brace - AWAITING FITTING- wear when out of bed and when head of bed is >45 degrees   -no bending, twisting or lifting more than 10 lbs, no driving   -x-ray lumbar spine AP and lateral views sitting upright with brace on obtained   -check serial post void residuals to make sure pt is not retaining -so far not retaining   -findings discussed with son HONG- who is POA via telephone    Followed up with Neurosurgery today 5/5/22   -continue plan as above    -await proper fitting of LSO brace   -trial multimodal pain regimen -start Medrol Dosepak, p r n   Muscle relaxers, scheduled Tylenol   -confirmed no surgical intervention at this time   -plan for outpatient follow-up in 2 weeks which will be set up by Neurosurgery    Anticipate back to [facility] tomorrow however will be set up for STR there  CM aware and covid test ordered for planned d c tomorrow    Type 2 diabetes mellitus, without long-term current use of insulin (HCC)  Assessment & Plan  · Glucose noted to be 334 on admission BMP  · Updated HA1c (was 5 8 in Dec  2018)  · Hemoglobin A1c here 11 1  · Reviewed paperwork from [facility] - appears pt was recently started on metformin and does have known diagnosis of diabetes there  Son was not aware of diagnosis   · Blood sugars here in the 200s  · Currently on insulin sliding scale  · Add low dose lantus 5 units hs in anticipation of BS rising with starting medrol dose pack    Hypokalemia  Assessment & Plan  · Unclear etiology - K as low as 2 6   · Repleted and now normalized  · Mag within normal limits    Hyponatremia  Assessment & Plan  Results from last 7 days   Lab Units 05/05/22  0432 05/04/22  0514 05/03/22  1618 05/03/22  0524   SODIUM mmol/L 137 137 137 140   · Presenting Na of 130  · Med rec shows that patient is on HCTZ 12 5 mg daily - held here on admission  · Seen and evaluated by nephrology here  · S/p course of IV fluid  · Na now normalized    Abnormal urine  Assessment & Plan  · Presented with complaint of back pain   · Found to have leukocytosis and tachycardia   · UA - innumerable amaya, 10-20 WBC, leukocytes  · Received IV ceftriaxone x2 days  · Urine culture growing mixed contaminants -antibiotics stopped  · Likely asymptomatic bacturia  · Blood cultures negative at 48 hrs    Essential hypertension  Assessment & Plan  · Continue amlodipine 5 mg daily      Personally updated [employer] med rec to match inpatient records      VTE Pharmacologic Prophylaxis:   Pharmacologic: Enoxaparin (Lovenox)  Mechanical VTE Prophylaxis in Place: Yes    Discharge Plan: With need for continued inpatient stay for pain control, TTS 0 brace fitting, and monitoring of blood sugars  Anticipate discharge back to STR at [facility] tomorrow      Discussions with Specialists or Other Care Team Provider: nursing     Education and Discussions with Family / Patient:  Patient, son via telephone Santos-updated    Time Spent for Care: 45 minutes  More than 50% of total time spent on counseling and coordination of care as described above  Current Length of Stay: 3 day(s)  Current Patient Status: Inpatient   Code Status: Level 3 - DNAR and DNI    Subjective:   Patient seen and evaluated earlier during rounds  Reports her pain fluctuates  She will be started on steroids  Attempted to discuss high blood sugars  Case and plan discussed with son via telephone who is in agreement  Discussed findings with son as well as elevated blood sugars  He was unaware that the patient had diabetes and was recently started on metformin at her facility  Objective:     Vitals:   Temp (24hrs), Av 2 °F (36 8 °C), Min:97 6 °F (36 4 °C), Max:99 3 °F (37 4 °C)    Temp:  [97 6 °F (36 4 °C)-99 3 °F (37 4 °C)] 98 2 °F (36 8 °C)  HR:  [74-87] 74  Resp:  [18] 18  BP: (134-156)/(71-86) 156/86  SpO2:  [93 %-97 %] 96 %  Body mass index is 23 11 kg/m²  Input and Output Summary (last 24 hours): Intake/Output Summary (Last 24 hours) at 2022 1159  Last data filed at 2022 0130  Gross per 24 hour   Intake --   Output 240 ml   Net -240 ml       Physical Exam:     Physical Exam  Vitals and nursing note reviewed  Constitutional:       General: She is not in acute distress  Appearance: Normal appearance  She is normal weight  She is not ill-appearing, toxic-appearing or diaphoretic  HENT:      Head: Normocephalic and atraumatic  Eyes:      General: No scleral icterus  Cardiovascular:      Rate and Rhythm: Normal rate and regular rhythm  Pulmonary:      Effort: Pulmonary effort is normal  No respiratory distress  Breath sounds: Normal breath sounds  No stridor  No wheezing or rhonchi  Abdominal:      General: Bowel sounds are normal  There is no distension        Palpations: Abdomen is soft  There is no mass  Tenderness: There is no abdominal tenderness  Hernia: No hernia is present  Musculoskeletal:         General: No swelling  Cervical back: Neck supple  Skin:     General: Skin is warm and dry  Neurological:      Mental Status: She is alert and oriented to person, place, and time  Mental status is at baseline  Psychiatric:         Mood and Affect: Mood normal          Behavior: Behavior normal          Additional Data:     Labs:    Results from last 7 days   Lab Units 05/05/22  0432 05/03/22  0524 05/03/22  0524   WBC Thousand/uL 7 54   < > 9 79   HEMOGLOBIN g/dL 14 5   < > 12 3   HEMATOCRIT % 42 8   < > 37 2   PLATELETS Thousands/uL 300   < > 238   NEUTROS PCT %  --   --  72   LYMPHS PCT %  --   --  18   MONOS PCT %  --   --  9   EOS PCT %  --   --  0    < > = values in this interval not displayed  Results from last 7 days   Lab Units 05/05/22  0432   POTASSIUM mmol/L 3 8   CHLORIDE mmol/L 102   CO2 mmol/L 21   BUN mg/dL 13   CREATININE mg/dL 0 60   CALCIUM mg/dL 8 9   ALK PHOS U/L 70   ALT U/L 15   AST U/L 15     Results from last 7 days   Lab Units 05/02/22  1655   INR  1 10       * I Have Reviewed All Lab Data Listed Above  * Additional Pertinent Lab Tests Reviewed: Leann 66 Admission Reviewed    Imaging:    Imaging Reports Reviewed Today Include: lumbar xrays  Imaging Personally Reviewed by Myself Includes:      Recent Cultures (last 7 days):     Results from last 7 days   Lab Units 05/02/22  1703 05/02/22  1538   BLOOD CULTURE  No Growth at 48 hrs  No Growth at 48 hrs    --    URINE CULTURE   --  <10,000 cfu/ml        Last 24 Hours Medication List:   Current Facility-Administered Medications   Medication Dose Route Frequency Provider Last Rate    acetaminophen  325 mg Oral Q6H PRN Mimi Castellanos MD      acetaminophen  975 mg Oral Q8H [address], PAIvan      amLODIPine  5 mg Oral After ANGI [facility]  aspirin  81 mg Oral Daily Lamar Dunlap MD      atorvastatin  40 mg Oral QPM Lamar Dunlap MD      bisacodyl  10 mg Oral Daily PRN Lamar Dunlap MD      clopidogrel  75 mg Oral Daily Lamar Dunlap MD      Diclofenac Sodium  2 g Topical 4x Daily Lamar Dunlap MD      enoxaparin  30 mg Subcutaneous Daily Lamar Dunlap MD      HYDROcodone-acetaminophen  1 tablet Oral Q6H PRN Lamar Dunlap MD      insulin glargine  5 Units Subcutaneous HS [employer], PA-C      insulin lispro  1-5 Units Subcutaneous TID [facility] Naveen Kelly PA-C      insulin lispro  1-5 Units Subcutaneous HS Naveen Kelly, GIRISH      lidocaine  1 patch Topical Daily MD Oral Mondragon ON 5/6/2022] methylPREDNISolone  20 mg Oral Daily MICHAEL Awad-C      Followed by   Dillon Sis ON 5/7/2022] methylPREDNISolone  16 mg Oral Daily MICHAEL Awad-C      Followed by   Dillon Sis ON 5/8/2022] methylPREDNISolone  12 mg Oral Daily MICHAEL Awad-C      Followed by   Dillon Sis ON 5/9/2022] methylPREDNISolone  8 mg Oral Daily Frances Trevino PA-C      Followed by   Dillon \[facility]\"" ON 5/10/2022] methylPREDNISolone  4 mg Oral Daily Frances Trevino PA-C      pantoprazole  20 mg Oral Daily Before Breakfast Lamar Dunlap MD      tiZANidine  4 mg Oral Q8H PRN Neva Carmona PA-C          Today, Patient Was Seen By: Neva Carmona PA-C    ** Please Note: This note has been constructed using a voice recognition system   **

## 2022-05-05 NOTE — ASSESSMENT & PLAN NOTE
Bed: 08  Expected date:   Expected time:   Means of arrival:   Comments:  [facility]       Kena Naranjo RN  09/30/20 5855 · Glucose noted to be 334 on admission BMP  · Updated HA1c (was 5 8 in Dec  2018)  · Hemoglobin A1c here 11 1  · Reviewed paperwork from [facility] pt was recently started on metformin and does have known diagnosis of diabetes there   Son was not aware of diagnosis   · Blood sugars here in the 200s  · Currently on insulin sliding scale  · Add low dose lantus 5 units hs in anticipation of BS rising with starting medrol dose pack

## 2022-05-05 NOTE — PLAN OF CARE
Problem: Potential for Falls  Goal: Patient will remain free of falls  Description: INTERVENTIONS:  - Educate patient/family on patient safety including physical limitations  - Instruct patient to call for assistance with activity   - Consult OT/PT to assist with strengthening/mobility   - Keep Call bell within reach  - Keep bed low and locked with side rails adjusted as appropriate  - Keep care items and personal belongings within reach  - Initiate and maintain comfort rounds  - Make Fall Risk Sign visible to staff  - Offer Toileting every Hours, in advance of need  - Initiate/Maintain alarm  - Obtain necessary fall risk management equipment:   - Apply yellow socks and bracelet for high fall risk patients  - Consider moving patient to room near nurses station  Outcome: Progressing     Problem: MOBILITY - ADULT  Goal: Maintain or return to baseline ADL function  Description: INTERVENTIONS:  -  Assess patient's ability to carry out ADLs; assess patient's baseline for ADL function and identify physical deficits which impact ability to perform ADLs (bathing, care of mouth/teeth, toileting, grooming, dressing, etc )  - Assess/evaluate cause of self-care deficits   - Assess range of motion  - Assess patient's mobility; develop plan if impaired  - Assess patient's need for assistive devices and provide as appropriate  - Encourage maximum independence but intervene and supervise when necessary  - Involve family in performance of ADLs  - Assess for home care needs following discharge   - Consider OT consult to assist with ADL evaluation and planning for discharge  - Provide patient education as appropriate  Outcome: Progressing  Goal: Maintains/Returns to pre admission functional level  Description: INTERVENTIONS:  - Perform BMAT or MOVE assessment daily    - Set and communicate daily mobility goal to care team and patient/family/caregiver     - Collaborate with rehabilitation services on mobility goals if consulted  - Perform Range of Motion  times a day  - Reposition patient every  hours    - Dangle patient  times a day  - Stand patient  times a day  - Ambulate patient  times a day  - Out of bed to chair  times a day   - Out of bed for meals  times a day  - Out of bed for toileting  - Record patient progress and toleration of activity level   Outcome: Progressing     Problem: PAIN - ADULT  Goal: Verbalizes/displays adequate comfort level or baseline comfort level  Description: Interventions:  - Encourage patient to monitor pain and request assistance  - Assess pain using appropriate pain scale  - Administer analgesics based on type and severity of pain and evaluate response  - Implement non-pharmacological measures as appropriate and evaluate response  - Consider cultural and social influences on pain and pain management  - Notify physician/advanced practitioner if interventions unsuccessful or patient reports new pain  Outcome: Progressing     Problem: INFECTION - ADULT  Goal: Absence or prevention of progression during hospitalization  Description: INTERVENTIONS:  - Assess and monitor for signs and symptoms of infection  - Monitor lab/diagnostic results  - Monitor all insertion sites, i e  indwelling lines, tubes, and drains  - Monitor endotracheal if appropriate and nasal secretions for changes in amount and color  - [place] appropriate cooling/warming therapies per order  - Administer medications as ordered  - Instruct and encourage patient and family to use good hand hygiene technique  - Identify and instruct in appropriate isolation precautions for identified infection/condition  Outcome: Progressing  Goal: Absence of fever/infection during neutropenic period  Description: INTERVENTIONS:  - Monitor WBC    Outcome: Progressing     Problem: SAFETY ADULT  Goal: Patient will remain free of falls  Description: INTERVENTIONS:  - Educate patient/family on patient safety including physical limitations  - Instruct patient to call for assistance with activity   - Consult OT/PT to assist with strengthening/mobility   - Keep Call bell within reach  - Keep bed low and locked with side rails adjusted as appropriate  - Keep care items and personal belongings within reach  - Initiate and maintain comfort rounds  - Make Fall Risk Sign visible to staff  - Offer Toileting every  Hours, in advance of need  - Initiate/Maintain alarm  - Obtain necessary fall risk management equipment:   - Apply yellow socks and bracelet for high fall risk patients  - Consider moving patient to room near nurses station  Outcome: Progressing  Goal: Maintain or return to baseline ADL function  Description: INTERVENTIONS:  -  Assess patient's ability to carry out ADLs; assess patient's baseline for ADL function and identify physical deficits which impact ability to perform ADLs (bathing, care of mouth/teeth, toileting, grooming, dressing, etc )  - Assess/evaluate cause of self-care deficits   - Assess range of motion  - Assess patient's mobility; develop plan if impaired  - Assess patient's need for assistive devices and provide as appropriate  - Encourage maximum independence but intervene and supervise when necessary  - Involve family in performance of ADLs  - Assess for home care needs following discharge   - Consider OT consult to assist with ADL evaluation and planning for discharge  - Provide patient education as appropriate  Outcome: Progressing  Goal: Maintains/Returns to pre admission functional level  Description: INTERVENTIONS:  - Perform BMAT or MOVE assessment daily    - Set and communicate daily mobility goal to care team and patient/family/caregiver  - Collaborate with rehabilitation services on mobility goals if consulted  - Perform Range of Motion  times a day  - Reposition patient every  hours    - Dangle patient  times a day  - Stand patient  times a day  - Ambulate patient  times a day  - Out of bed to chair  times a day   - Out of bed for meals  times a day  - Out of bed for toileting  - Record patient progress and toleration of activity level   Outcome: Progressing     Problem: DISCHARGE PLANNING  Goal: Discharge to home or other facility with appropriate resources  Description: INTERVENTIONS:  - Identify barriers to discharge w/patient and caregiver  - Arrange for needed discharge resources and transportation as appropriate  - Identify discharge learning needs (meds, wound care, etc )  - Arrange for interpretive services to assist at discharge as needed  - Refer to Case Management Department for coordinating discharge planning if the patient needs post-hospital services based on physician/advanced practitioner order or complex needs related to functional status, cognitive ability, or social support system  Outcome: Progressing     Problem: Knowledge Deficit  Goal: Patient/family/caregiver demonstrates understanding of disease process, treatment plan, medications, and discharge instructions  Description: Complete learning assessment and assess knowledge base    Interventions:  - Provide teaching at level of understanding  - Provide teaching via preferred learning methods  Outcome: Progressing     Problem: Prexisting or High Potential for Compromised Skin Integrity  Goal: Skin integrity is maintained or improved  Description: INTERVENTIONS:  - Identify patients at risk for skin breakdown  - Assess and monitor skin integrity  - Assess and monitor nutrition and hydration status  - Monitor labs   - Assess for incontinence   - Turn and reposition patient  - Assist with mobility/ambulation  - Relieve pressure over bony prominences  - Avoid friction and shearing  - Provide appropriate hygiene as needed including keeping skin clean and dry  - Evaluate need for skin moisturizer/barrier cream  - Collaborate with interdisciplinary team   - Patient/family teaching  - Consider wound care consult   Outcome: Progressing

## 2022-05-05 NOTE — TELEPHONE ENCOUNTER
5/9/22- PT DISCHARGED TO [facility]   PLEASE SEE NAVID NOTE    5/6/22- PT IN HOSPITAL    5/5/22- [address] [place] [address] ([place])  An Vikas Crystal 27 F/U SCHEDULED 5/24/22  PT WILL NEED LSPINE XRAYS     5/4/22- (JACK) S/O, F/U IN 2WKS WITH UPRIGHT XR

## 2022-05-05 NOTE — ASSESSMENT & PLAN NOTE
· Presented with complaint of back pain   · Found to have leukocytosis and tachycardia   · UA - innumerable amaya, 10-20 WBC, leukocytes  · Received IV ceftriaxone x2 days  · Urine culture growing mixed contaminants -antibiotics stopped  · Likely asymptomatic bacturia  · Blood cultures negative at 48 hrs

## 2022-05-05 NOTE — ASSESSMENT & PLAN NOTE
Results from last 7 days   Lab Units 05/05/22  0432 05/04/22  0514 05/03/22  1618 05/03/22  0524   SODIUM mmol/L 137 137 137 140   · Presenting Na of 130  · Med rec shows that patient is on HCTZ 12 5 mg daily - held here on admission  · Seen and evaluated by nephrology here  · S/p course of IV fluid  · Na now normalized

## 2022-05-05 NOTE — TREATMENT PLAN
Imaging:  · XR lumbar spine 5/4/2022: Study limited predominantly at the L5 level with no significant interval changes from MRI performed yesterday  Plan:  · Imaging reviewed with Neurosurgery team   As previously discussed given patient's age and comorbidities unlikely to offer any surgical intervention  · Will continue with conservative management including LSO brace when out of bed and head of bed greater than 45°   · No bending, twisting or lifting more than 10 lb, no driving   · Plan for outpatient follow-up in 2 weeks time with repeat upright lumbar spine x-rays    Continue with multimodal pain regimen including schedule Tylenol, muscle relaxers, steroids, narcotics, etc   · Please call with questions or concerns, signed off at this time

## 2022-05-05 NOTE — ASSESSMENT & PLAN NOTE
· Presented with the complaint of back pain on 5/2/22 from [facility]  · Acute on chronic low back pain secondary to lumbar degenerative disc disease and severe bilateral hip arthritis  · Seen and evaluated by ortho who ordered MRI lumbar spine given significant changes on CT    · MRI lumbar spine:  Acute fracture of L5 vertebrae with mild bony retropulsion of superior endplate into the spinal canal   No epidural hematoma but severe spinal stenosis at L4-L5 intervertebral disc space  Indeterminate marrow lesion in T10 vertebrae- both benign and malignant etiologies are in differential   Further clinical assessment advised  Chronic L3 and L4 compression deformities  · Consulted Neuro surgery for evaluation with the following recommendations:   -given poor baseline functional status, continue with conservative management   -place TSLO brace - AWAITING FITTING- wear when out of bed and when head of bed is >45 degrees   -no bending, twisting or lifting more than 10 lbs, no driving   -x-ray lumbar spine AP and lateral views sitting upright with brace on obtained   -check serial post void residuals to make sure pt is not retaining -so far not retaining   -findings discussed with son HONG- who is POA via telephone    Followed up with Neurosurgery today 5/5/22   -continue plan as above    -await proper fitting of LSO brace   -trial multimodal pain regimen -start Medrol Dosepak, p r n   Muscle relaxers, scheduled Tylenol   -confirmed no surgical intervention at this time   -plan for outpatient follow-up in 2 weeks which will be set up by Neurosurgery    Anticipate back to [facility] tomorrow however will be set up for STR there  CM aware and covid test ordered for planned d c tomorrow

## 2022-05-05 NOTE — DISCHARGE INSTRUCTIONS
Discharge Instructions - Neurosurgery    · LSO brace to be worn when out of bed of head of bed greater than 45 degrees  May be removed for showering  · No heavy lifting  · No strenuous activities  · No Driving  **Please notify MD immediately if you have increased back or leg pain  New numbness, tingling and/or weakness in your legs  **    Please follow up in 2 weeks with the Neurosurgical group with repeat X-ray of lumbar spine spine 2-3 days prior to your appointment  You may go to any [facility]'s facility to get your imaging done  Please call [phone] to schedule your imaging appointment

## 2022-05-05 NOTE — PLAN OF CARE
Problem: PHYSICAL THERAPY ADULT  Goal: Performs mobility at highest level of function for planned discharge setting  See evaluation for individualized goals  Description: Treatment/Interventions: Functional transfer training,LE strengthening/ROM,Therapeutic exercise,Cognitive reorientation,Patient/family training,Equipment eval/education,Bed mobility,Gait training,Compensatory technique education,Continued evaluation,Spoke to nursing,OT          See flowsheet documentation for full assessment, interventions and recommendations  Outcome: Progressing  Note: Prognosis: Fair  Problem List: Decreased strength,Decreased range of motion,Impaired balance,Decreased mobility,Decreased coordination,Decreased cognition,Impaired judgement,Impaired hearing,Pain,Orthopedic restrictions  Assessment: Pt  in bed upon entry  Pt  reported decreased pain this session and was able to perform LE Katerine in supine without assistance  However patient reported pain in LLE when seated at EOB  Decreased assist required for supine to sit transfer this session to the R side with log roll technique  Donned TLSO on while seated at EOB  Pt  c/o severe pain in LLE while sitting at EOB however noted no increased difficulty while performing seated LE Katerine or no antalgic gait noted during ambulation  However noted LE weakness and L knee buckling with trendelburg gait noted  Scissoring gait with very NBOS noted  For SPT to the R patient tend to cross LLE first before moving her RLE  Needed verbal and tactile cueing for LE sequencing for SPT and for turns  Pt  needed much encouragement and emotional support to attempt ambulation and WBing activities  Pt  was given total A for pericare in standing  pt  reported she cannot do it herself  pt  very anxious and fearful of all OOB activities  Chair follow given for ambulation and occasionally second assist provided for safety during transfers and ambulation  Seated rest between ambulation   pt  limited in her mobility due to her fears  Pt  was transferred from EOB to [facility] and back with min A x 2 and cues for hand placement  pt  transferred from EOb to recliner with min A x 1 with max cues for step sequencing  pt  positioned in recliner with alarm engaged and nursing staff aware of the status  Pt  very pleased with her progress  Will continue to follow during the stay to maximize functional mobility  pt  shows good potential to improve her functional mobility and LE strengh with skilled PT  Barriers to Discharge: Decreased caregiver support        PT Discharge Recommendation: Post acute rehabilitation services          See flowsheet documentation for full assessment

## 2022-05-05 NOTE — PLAN OF CARE
Problem: Potential for Falls  Goal: Patient will remain free of falls  Description: INTERVENTIONS:  - Educate patient/family on patient safety including physical limitations  - Instruct patient to call for assistance with activity   - Consult OT/PT to assist with strengthening/mobility   - Keep Call bell within reach  - Keep bed low and locked with side rails adjusted as appropriate  - Keep care items and personal belongings within reach  - Initiate and maintain comfort rounds  - Make Fall Risk Sign visible to staff  - Offer Toileting every  Hours, in advance of need  - Initiate/Maintain alarm  - Obtain necessary fall risk management equipment:   - Apply yellow socks and bracelet for high fall risk patients  - Consider moving patient to room near nurses station  Outcome: Progressing     Problem: MOBILITY - ADULT  Goal: Maintain or return to baseline ADL function  Description: INTERVENTIONS:  -  Assess patient's ability to carry out ADLs; assess patient's baseline for ADL function and identify physical deficits which impact ability to perform ADLs (bathing, care of mouth/teeth, toileting, grooming, dressing, etc )  - Assess/evaluate cause of self-care deficits   - Assess range of motion  - Assess patient's mobility; develop plan if impaired  - Assess patient's need for assistive devices and provide as appropriate  - Encourage maximum independence but intervene and supervise when necessary  - Involve family in performance of ADLs  - Assess for home care needs following discharge   - Consider OT consult to assist with ADL evaluation and planning for discharge  - Provide patient education as appropriate  Outcome: Progressing  Goal: Maintains/Returns to pre admission functional level  Description: INTERVENTIONS:  - Perform BMAT or MOVE assessment daily    - Set and communicate daily mobility goal to care team and patient/family/caregiver     - Collaborate with rehabilitation services on mobility goals if consulted  - Perform Range of Motion  times a day  - Reposition patient every  hours    - Dangle patient  times a day  - Stand patient  times a day  - Ambulate patient  times a day  - Out of bed to chair  times a day   - Out of bed for meals  times a day  - Out of bed for toileting  - Record patient progress and toleration of activity level   Outcome: Progressing     Problem: PAIN - ADULT  Goal: Verbalizes/displays adequate comfort level or baseline comfort level  Description: Interventions:  - Encourage patient to monitor pain and request assistance  - Assess pain using appropriate pain scale  - Administer analgesics based on type and severity of pain and evaluate response  - Implement non-pharmacological measures as appropriate and evaluate response  - Consider cultural and social influences on pain and pain management  - Notify physician/advanced practitioner if interventions unsuccessful or patient reports new pain  Outcome: Progressing     Problem: INFECTION - ADULT  Goal: Absence or prevention of progression during hospitalization  Description: INTERVENTIONS:  - Assess and monitor for signs and symptoms of infection  - Monitor lab/diagnostic results  - Monitor all insertion sites, i e  indwelling lines, tubes, and drains  - Monitor endotracheal if appropriate and nasal secretions for changes in amount and color  - [place] appropriate cooling/warming therapies per order  - Administer medications as ordered  - Instruct and encourage patient and family to use good hand hygiene technique  - Identify and instruct in appropriate isolation precautions for identified infection/condition  Outcome: Progressing  Goal: Absence of fever/infection during neutropenic period  Description: INTERVENTIONS:  - Monitor WBC    Outcome: Progressing     Problem: SAFETY ADULT  Goal: Patient will remain free of falls  Description: INTERVENTIONS:  - Educate patient/family on patient safety including physical limitations  - Instruct patient to call for assistance with activity   - Consult OT/PT to assist with strengthening/mobility   - Keep Call bell within reach  - Keep bed low and locked with side rails adjusted as appropriate  - Keep care items and personal belongings within reach  - Initiate and maintain comfort rounds  - Make Fall Risk Sign visible to staff  - Offer Toileting every  Hours, in advance of need  - Initiate/Maintain alarm  - Obtain necessary fall risk management equipment:   - Apply yellow socks and bracelet for high fall risk patients  - Consider moving patient to room near nurses station  Outcome: Progressing  Goal: Maintain or return to baseline ADL function  Description: INTERVENTIONS:  -  Assess patient's ability to carry out ADLs; assess patient's baseline for ADL function and identify physical deficits which impact ability to perform ADLs (bathing, care of mouth/teeth, toileting, grooming, dressing, etc )  - Assess/evaluate cause of self-care deficits   - Assess range of motion  - Assess patient's mobility; develop plan if impaired  - Assess patient's need for assistive devices and provide as appropriate  - Encourage maximum independence but intervene and supervise when necessary  - Involve family in performance of ADLs  - Assess for home care needs following discharge   - Consider OT consult to assist with ADL evaluation and planning for discharge  - Provide patient education as appropriate  Outcome: Progressing  Goal: Maintains/Returns to pre admission functional level  Description: INTERVENTIONS:  - Perform BMAT or MOVE assessment daily    - Set and communicate daily mobility goal to care team and patient/family/caregiver  - Collaborate with rehabilitation services on mobility goals if consulted  - Perform Range of Motion  times a day  - Reposition patient every  hours    - Dangle patient  times a day  - Stand patient  times a day  - Ambulate patient  times a day  - Out of bed to chair  times a day   - Out of bed for meals times a day  - Out of bed for toileting  - Record patient progress and toleration of activity level   Outcome: Progressing     Problem: DISCHARGE PLANNING  Goal: Discharge to home or other facility with appropriate resources  Description: INTERVENTIONS:  - Identify barriers to discharge w/patient and caregiver  - Arrange for needed discharge resources and transportation as appropriate  - Identify discharge learning needs (meds, wound care, etc )  - Arrange for interpretive services to assist at discharge as needed  - Refer to Case Management Department for coordinating discharge planning if the patient needs post-hospital services based on physician/advanced practitioner order or complex needs related to functional status, cognitive ability, or social support system  Outcome: Progressing     Problem: Knowledge Deficit  Goal: Patient/family/caregiver demonstrates understanding of disease process, treatment plan, medications, and discharge instructions  Description: Complete learning assessment and assess knowledge base    Interventions:  - Provide teaching at level of understanding  - Provide teaching via preferred learning methods  Outcome: Progressing     Problem: Prexisting or High Potential for Compromised Skin Integrity  Goal: Skin integrity is maintained or improved  Description: INTERVENTIONS:  - Identify patients at risk for skin breakdown  - Assess and monitor skin integrity  - Assess and monitor nutrition and hydration status  - Monitor labs   - Assess for incontinence   - Turn and reposition patient  - Assist with mobility/ambulation  - Relieve pressure over bony prominences  - Avoid friction and shearing  - Provide appropriate hygiene as needed including keeping skin clean and dry  - Evaluate need for skin moisturizer/barrier cream  - Collaborate with interdisciplinary team   - Patient/family teaching  - Consider wound care consult   Outcome: Progressing

## 2022-05-05 NOTE — PHYSICAL THERAPY NOTE
Physical Therapy Treatment Note     05/05/22 1514   PT Last Visit   PT Visit Date 05/05/22   Note Type   Note Type Treatment   Pain Assessment   Pain Assessment Tool 0-10   Pain Score 10 - Worst Possible Pain   Pain Location/Orientation Orientation: Left; Location: Leg   Precautions   Spinal Precautions Yes   Restrictions/Precautions   Weight Bearing Precautions Per Order No   Braces or Orthoses TLSO   Other Precautions Chair Alarm;Cognitive;Spinal precautions; Fall Risk;Pain;Bed Alarm   General   Chart Reviewed Yes   Family/Caregiver Present No   Subjective   Subjective Pt  in bed upon entry  Agreeable to PT  Bed Mobility   Supine to Sit 3  Moderate assistance   Additional items Assist x 1;Bedrails; Increased time required;LE management;Verbal cues  (Log roll)   Transfers   Sit to Stand 3  Moderate assistance   Additional items Assist x 1; Increased time required;Verbal cues;Armrests;Assist x 2  (As distance increased with second amb  needed another SBA)   Stand to Sit 4  Minimal assistance   Additional items Assist x 1; Increased time required;Verbal cues   Stand pivot 3  Moderate assistance   Additional items Assist x 1; Increased time required;Verbal cues  (arm in arm and with RW)   Toilet transfer 4  Minimal assistance   Additional items Assist x 2; Increased time required;Verbal cues; Commode   Ambulation/Elevation   Gait pattern L Hemiparesis;L Knee Candace; Improper Weight shift;Scissoring;[place];Decreased foot clearance;Narrow JANE; Short stride; Foward flexed; Inconsistent kishore; Excessively slow;Decreased heel strike;Decreased toe off   Gait Assistance   (Min - Mod A x 1, SBA)   Additional items Assist x 2;Verbal cues   Assistive Device Rolling walker   Distance 20ft x 2   Balance   Static Sitting Fair +   Dynamic Sitting Fair   Static Standing Fair -   Dynamic Standing Poor +   Ambulatory Poor +   Endurance Deficit   Endurance Deficit No   Activity Tolerance   Activity Tolerance Patient tolerated treatment well   Nurse Made Aware yes   Exercises   THR Supine;Sitting;Bilateral;AAROM;20 reps;10 reps   Assessment   Prognosis Fair   Problem List Decreased strength;Decreased range of motion; Impaired balance;Decreased mobility; Decreased coordination;Decreased cognition; Impaired judgement; Impaired hearing;Pain;Orthopedic restrictions   Assessment Pt  in bed upon entry  Pt  reported decreased pain this session and was able to perform LE Katerine in supine without assistance  However patient reported pain in LLE when seated at EOB  Decreased assist required for supine to sit transfer this session to the R side with log roll technique  Donned TLSO on while seated at EOB  Pt  c/o severe pain in LLE while sitting at EOB however noted no increased difficulty while performing seated LE Katerine or no antalgic gait noted during ambulation  However noted LE weakness and L knee buckling with trendelburg gait noted  Scissoring gait with very NBOS noted  For SPT to the R patient tend to cross LLE first before moving her RLE  Needed verbal and tactile cueing for LE sequencing for SPT and for turns  Pt  needed much encouragement and emotional support to attempt ambulation and WBing activities  Pt  was given total A for pericare in standing  pt  reported she cannot do it herself  pt  very anxious and fearful of all OOB activities  Chair follow given for ambulation and occasionally second assist provided for safety during transfers and ambulation  Seated rest between ambulation  pt  limited in her mobility due to her fears  Pt  was transferred from EOB to [facility] and back with min A x 2 and cues for hand placement  pt  transferred from EOb to recliner with min A x 1 with max cues for step sequencing  pt  positioned in recliner with alarm engaged and nursing staff aware of the status  Pt  very pleased with her progress   Will continue to follow during the stay to maximize functional mobility  pt  shows good potential to improve her functional mobility and DENISE maria with skilled PT  Goals   Patient Goals None reported   STG Expiration Date 05/17/22   PT Treatment Day 3   Plan   Treatment/Interventions Functional transfer training;LE strengthening/ROM; Therapeutic exercise;Gait training;Bed mobility; Equipment eval/education;Patient/family training;Spoke to nursing   Progress Progressing toward goals   PT Frequency 3-5x/wk   Recommendation   PT Discharge Recommendation Post acute rehabilitation services   AM-PAC Basic Mobility Inpatient   Turning in Bed Without Bedrails 3   Lying on Back to Sitting on Edge of Flat Bed 3   Moving Bed to Chair 3   Standing Up From Chair 3   Walk in Room 3   Climb 3-5 Stairs 2   Basic Mobility Inpatient Raw Score 17   Basic Mobility Standardized Score 39 67   Turning Head Towards Sound 4   Follow Simple Instructions 4   Low Function Basic Mobility Raw Score 25   Low Function Basic Mobility Standardized Score 39 85   Highest Level Of Mobility   JH-HLM Goal 5: Stand one or more mins   JH-HLM Highest Level of Mobility 6: Walk 10 steps or more   JH-HLM Goal Achieved Yes   End of Consult   Patient Position at End of Consult Bedside chair;Bed/Chair alarm activated; All needs within reach         Jose Enrique Woodward PTA    An AM-PAC basic mobility standardized score less than 42 9 suggest the patient may benefit from discharge to post-acute rehab services

## 2022-05-05 NOTE — NURSING NOTE
Agree with previous nursing  Assessment  Patient in bed with no complaints or signs of distress  Call bell and personal care items within reach   Will continue to monitor

## 2022-05-05 NOTE — CASE MANAGEMENT
Case Management Discharge Planning Note    Patient name Stanley Wooten  Location [facility] [address] MRN [id number]  : 1929 Date 2022       Current Admission Date: 2022  Current Admission Diagnosis:Chronic midline low back pain without sciatica   Patient Active Problem List    Diagnosis Date Noted    Sepsis due to urinary tract infection ([facility] [address] ) 2022    Hyponatremia 2022    Hypokalemia 2022    Chronic midline low back pain without sciatica 2022    Abnormal CT scan, sinus 10/01/2019    Sinus bradycardia 10/01/2019    Closed fracture of fourth metatarsal bone 2019    Essential hypertension 2019    Premature atrial beats 2019    Type 2 diabetes mellitus with complication, with long-term current use of insulin ([facility] [address] ) 2019    Microscopic hematuria 2019    Pancreatic lesion 2019    Abnormal urine cytology 2019    Chronic renal failure 2019    Osteopenia 2019    Physical deconditioning 2018    Abnormality of gait 2018    Type 2 diabetes mellitus, without long-term current use of insulin ([facility] [address] ) 2018    Transient cerebral ischemia 12/10/2018    Hypertensive urgency 12/10/2018    Gastroesophageal reflux 12/10/2018      LOS (days): 3  Geometric Mean LOS (GMLOS) (days): 3 50  Days to GMLOS:0 8     OBJECTIVE:  Risk of Unplanned Readmission Score: 13         Current admission status: Inpatient   Preferred Pharmacy:   [address] [place] [address], [address]  Phone: [phone] Fax: [phone]    Primary Care Provider: No primary care provider on file      Primary Insurance: MEDICARE  Secondary Insurance:     DISCHARGE DETAILS:    Discharge planning discussed with[de-identified] vanessa Pereyra  Freedom of Choice: Yes  Comments - Freedom of Choice: Son requesting rehab at [address] [place] [address] L contacted family/caregiver?: Yes  Were Treatment Team discharge recommendations reviewed with patient/caregiver?: Yes  Did patient/caregiver verbalize understanding of patient care needs?: N/A- going to facility  Were patient/caregiver advised of the risks associated with not following Treatment Team discharge recommendations?: Yes    Contacts  Patient Contacts: Anrdez Valle  Relationship to Patient[de-identified] Family  Contact Method: Phone  Phone Number: [phone]  Reason/Outcome: Emergency Contact,Continuity of [facility]         Is the patient interested in [address] at discharge?: No    DME Referral Provided  Referral made for DME?: No    Other Referral/Resources/Interventions Provided:  Interventions: Short Term Rehab  Referral Comments: rehab at [facility] tomorrow         Treatment Team Recommendation: Short Term Rehab  Discharge Destination Plan[de-identified] [address] : [address] [address]  Receiving Facility/Agency Phone Number: [phone]  Facility/Agency Fax Number: [phone] fax

## 2022-05-05 NOTE — RESTORATIVE TECHNICIAN NOTE
Restorative Technician Note      Patient Name: Jeremiah Barragan     Restorative Tech Visit Date: 05/05/22      Pt is not appropriate to fit TLSO at this time   Follow up in the PM

## 2022-05-06 VITALS
OXYGEN SATURATION: 94 % | RESPIRATION RATE: 18 BRPM | BODY MASS INDEX: 22.75 KG/M2 | HEART RATE: 61 BPM | DIASTOLIC BLOOD PRESSURE: 73 MMHG | TEMPERATURE: 98.1 F | HEIGHT: 63 IN | SYSTOLIC BLOOD PRESSURE: 148 MMHG | WEIGHT: 128.4 LBS

## 2022-05-06 LAB
GLUCOSE SERPL-MCNC: 220 MG/DL (ref 65–140)
GLUCOSE SERPL-MCNC: 253 MG/DL (ref 65–140)

## 2022-05-06 PROCEDURE — 97116 GAIT TRAINING THERAPY: CPT

## 2022-05-06 PROCEDURE — 82948 REAGENT STRIP/BLOOD GLUCOSE: CPT

## 2022-05-06 PROCEDURE — 99239 HOSP IP/OBS DSCHRG MGMT >30: CPT | Performed by: INTERNAL MEDICINE

## 2022-05-06 PROCEDURE — 97530 THERAPEUTIC ACTIVITIES: CPT

## 2022-05-06 RX ORDER — METHYLPREDNISOLONE 4 MG/1
TABLET ORAL
Refills: 0
Start: 2022-05-06

## 2022-05-06 RX ADMIN — INSULIN LISPRO 2 UNITS: 100 INJECTION, SOLUTION INTRAVENOUS; SUBCUTANEOUS at 11:26

## 2022-05-06 RX ADMIN — ACETAMINOPHEN 975 MG: 325 TABLET, FILM COATED ORAL at 05:39

## 2022-05-06 RX ADMIN — ACETAMINOPHEN 975 MG: 325 TABLET, FILM COATED ORAL at 13:35

## 2022-05-06 RX ADMIN — LIDOCAINE 1 PATCH: 50 PATCH CUTANEOUS at 08:24

## 2022-05-06 RX ADMIN — ENOXAPARIN SODIUM 30 MG: 30 INJECTION, SOLUTION INTRAVENOUS; SUBCUTANEOUS at 08:23

## 2022-05-06 RX ADMIN — ASPIRIN 81 MG CHEWABLE TABLET 81 MG: 81 TABLET CHEWABLE at 08:24

## 2022-05-06 RX ADMIN — METHYLPREDNISOLONE 20 MG: 16 TABLET ORAL at 08:24

## 2022-05-06 RX ADMIN — DICLOFENAC SODIUM 2 G: 10 GEL TOPICAL at 08:24

## 2022-05-06 RX ADMIN — CLOPIDOGREL BISULFATE 75 MG: 75 TABLET ORAL at 08:24

## 2022-05-06 RX ADMIN — DICLOFENAC SODIUM 2 G: 10 GEL TOPICAL at 11:26

## 2022-05-06 RX ADMIN — INSULIN LISPRO 1 UNITS: 100 INJECTION, SOLUTION INTRAVENOUS; SUBCUTANEOUS at 08:23

## 2022-05-06 RX ADMIN — PANTOPRAZOLE SODIUM 20 MG: 20 TABLET, DELAYED RELEASE ORAL at 05:39

## 2022-05-06 NOTE — INCIDENTAL FINDINGS
The following findings require follow up:  Radiographic finding     Finding: Acute fracture of the L5 vertebra with mild bony retropulsion of the superior endplate into the spinal canal   No epidural hematoma but there is severe spinal stenosis at the L4-5 intervertebral disc space  Indeterminate marrow lesion in the T10 vertebra  Both benign and malignant (solitary osseous metastases) etiologies are in the differential diagnosis  Further clinical assessment advised  Chronic L3 and L4 compression deformities  Follow up required:   Follow-up with neurosurgery as scheduled   Follow up should be done within 3-4  week(s)    Please notify the following clinician to assist with the follow up:   Dr Nathan Martinez MD

## 2022-05-06 NOTE — PLAN OF CARE
Problem: PHYSICAL THERAPY ADULT  Goal: Performs mobility at highest level of function for planned discharge setting  See evaluation for individualized goals  Description: Treatment/Interventions: Functional transfer training,LE strengthening/ROM,Therapeutic exercise,Cognitive reorientation,Patient/family training,Equipment eval/education,Bed mobility,Gait training,Compensatory technique education,Continued evaluation,Spoke to nursing,OT          See flowsheet documentation for full assessment, interventions and recommendations  Outcome: Progressing  Note: Prognosis: Fair  Problem List: Decreased strength,Decreased range of motion,Decreased endurance,Impaired balance,Decreased mobility,Decreased coordination,Decreased cognition,Impaired judgement,Pain,Orthopedic restrictions,Impaired hearing  Assessment: Pt  noted with good progress in her overall mobility needing decreased assist and decreased complaints from patient  pt  reported she didnt think she was ready to do any mobility this day however with encouragement and education patient particiapted well in therapy  Log roll performed for supine to sit transfer and VCs needed but decreased assist given compared to yesterday  Pt  noted with decreased frequency of complaints of pain this session  Assistance for STS transfers varied between Mod-Min A x 1  Noted decreased scissoring gait with slightly WBOS during ambulation  Noted improved gait quality as session progressed  pt  seated rests between ambulation  mod-Min A x 1 given for ambulation and another A for chair follow due to patient's decreased tolerance to activities  pt  seated on recliner post session with all needs and lunch within reach and chair alarm engaged  pt  given total A in donning TLSO while seated at EOB  Pt  has good potential for improving her mobility, LE strength and balance with continued skilled PT  pt  also needs encouragement and education to participate in activities    Barriers to Discharge: None        PT Discharge Recommendation: Post acute rehabilitation services          See flowsheet documentation for full assessment

## 2022-05-06 NOTE — ASSESSMENT & PLAN NOTE
40-year-old female living independently at Son presents with worsening low back pain found to have acute L5 fracture with severe spinal stenosis  · Case was coordinated with neuro surgery who recommends bracing and eventual follow-up  · Instantly found to have marrow abnormality at T10    Follow-up with neuro surgery scheduled  · Patient being discharged to [facility]

## 2022-05-06 NOTE — RESTORATIVE TECHNICIAN NOTE
Restorative Technician Note      Patient Name: Wanda Carlos     Restorative Tech Visit Date: 05/06/22  Note Type: Mobility  Patient Position Upon Consult: Supine  Activity Performed: Ambulated; Transferred  Assistive Device: Roller walker  Brace Applied: Last[place]  Additional Brace Ordered: No  Patient Position When Brace Applied: Seated  Education Provided: Yes  Patient Position at End of Consult: Bedside chair;  All needs within reach; Bed/Chair alarm activated

## 2022-05-06 NOTE — PHYSICAL THERAPY NOTE
Physical Therapy Treatment Note     05/06/22 1206   PT Last Visit   PT Visit Date 05/06/22   Note Type   Note Type Treatment   Pain Assessment   Pain Assessment Tool 0-10   Pain Score 10 - Worst Possible Pain   Pain Location/Orientation Location: Back; Location: Leg;Orientation: Left;Orientation: Right   Precautions   Spinal Precautions No bending, lifting, twisting   Restrictions/Precautions   Weight Bearing Precautions Per Order No   Braces or Orthoses TLSO   Other Precautions Pain; Fall Risk; Chair Alarm;Hard of hearing;Spinal precautions;Cognitive   General   Chart Reviewed Yes   Family/Caregiver Present No   Cognition   Overall Cognitive Status WFL   Subjective   Subjective Pt  in bed upon entry  Agreeable to PT with encouragement  Reported "you are doing this too soon  Can you wait another 2-3 days"? Bed Mobility   Supine to Sit 3  Moderate assistance   Additional items Assist x 1;Bedrails; Increased time required;LE management;Verbal cues  (log roll, HOB flat)   Transfers   Sit to Stand 3  Moderate assistance   Additional items Assist x 1; Increased time required;Verbal cues;Armrests   Stand to Sit 4  Minimal assistance   Additional items Assist x 1; Increased time required;Verbal cues;Armrests   Stand pivot 3  Moderate assistance   Additional items Assist x 1;Verbal cues; Increased time required  (with RW)   Ambulation/Elevation   Gait pattern L Hemiparesis;[place];Decreased foot clearance;Narrow JANE; Improper Weight shift; Inconsistent kishore; Foward flexed; Short stride; Step to;Excessively slow;Decreased heel strike;Decreased toe off;Step through pattern   Gait Assistance 3  Moderate assist   Additional items Assist x 1;Assist x 2;Verbal cues  (2nd A for chair follow only)   Assistive Device Rolling walker   Distance 20ft, 12ft, 32ft   Balance   Static Sitting Fair +   Dynamic Sitting Fair   Static Standing Fair   Dynamic Standing Poor +   Ambulatory Poor +   Endurance Deficit   Endurance Deficit Yes Endurance Deficit Description Gross weakness, pain   Activity Tolerance   Activity Tolerance Patient tolerated treatment well;Patient limited by pain; Other (Comment)  (fear, anxiety)   Nurse Made Aware Yes   Assessment   Prognosis Fair   Problem List Decreased strength;Decreased range of motion;Decreased endurance; Impaired balance;Decreased mobility; Decreased coordination;Decreased cognition; Impaired judgement;Pain;Orthopedic restrictions; Impaired hearing   Assessment Pt  noted with good progress in her overall mobility needing decreased assist and decreased complaints from patient  pt  reported she didnt think she was ready to do any mobility this day however with encouragement and education patient particiapted well in therapy  Log roll performed for supine to sit transfer and VCs needed but decreased assist given compared to yesterday  Pt  noted with decreased frequency of complaints of pain this session  Assistance for STS transfers varied between Mod-Min A x 1  Noted decreased scissoring gait with slightly WBOS during ambulation  Noted improved gait quality as session progressed  pt  seated rests between ambulation  mod-Min A x 1 given for ambulation and another A for chair follow due to patient's decreased tolerance to activities  pt  seated on recliner post session with all needs and lunch within reach and chair alarm engaged  pt  given total A in donning TLSO while seated at EOB  Pt  has good potential for improving her mobility, LE strength and balance with continued skilled PT  pt  also needs encouragement and education to participate in activities  Barriers to Discharge None   Goals   Patient Goals None reported   STG Expiration Date 05/17/22   PT Treatment Day 4   Plan   Treatment/Interventions Functional transfer training;LE strengthening/ROM; Endurance training;Patient/family training;Equipment eval/education;Gait training;Bed mobility;Spoke to nursing   Progress Progressing toward goals   PT Frequency 3-5x/wk   Recommendation   PT Discharge Recommendation Post acute rehabilitation services   AM-PAC Basic Mobility Inpatient   Turning in Bed Without Bedrails 3   Lying on Back to Sitting on Edge of Flat Bed 3   Moving Bed to Chair 3   Standing Up From Chair 3   Walk in Room 3   Climb 3-5 Stairs 2   Basic Mobility Inpatient Raw Score 17   Basic Mobility Standardized Score 39 67   Turning Head Towards Sound 4   Follow Simple Instructions 4   Low Function Basic Mobility Raw Score 25   Low Function Basic Mobility Standardized Score 39 85   Highest Level Of Mobility   -[facility] Goal 5: Stand one or more mins   -[facility] Highest Level of Mobility 6: Walk 10 steps or more   -[facility] Goal Achieved Yes   End of Consult   Patient Position at End of Consult Bed/Chair alarm activated; All needs within reach; Bedside chair         Amber Box PTA    An AM-PAC basic mobility standardized score less than 42 9 suggest the patient may benefit from discharge to post-acute rehab services

## 2022-05-06 NOTE — PLAN OF CARE
Problem: Potential for Falls  Goal: Patient will remain free of falls  Description: INTERVENTIONS:  - Educate patient/family on patient safety including physical limitations  - Instruct patient to call for assistance with activity   - Consult OT/PT to assist with strengthening/mobility   - Keep Call bell within reach  - Keep bed low and locked with side rails adjusted as appropriate  - Keep care items and personal belongings within reach  - Initiate and maintain comfort rounds  - Make Fall Risk Sign visible to staff  - Offer Toileting every  Hours, in advance of need  - Initiate/Maintain alarm  - Obtain necessary fall risk management equipment:   - Apply yellow socks and bracelet for high fall risk patients  - Consider moving patient to room near nurses station  Outcome: Progressing     Problem: MOBILITY - ADULT  Goal: Maintain or return to baseline ADL function  Description: INTERVENTIONS:  -  Assess patient's ability to carry out ADLs; assess patient's baseline for ADL function and identify physical deficits which impact ability to perform ADLs (bathing, care of mouth/teeth, toileting, grooming, dressing, etc )  - Assess/evaluate cause of self-care deficits   - Assess range of motion  - Assess patient's mobility; develop plan if impaired  - Assess patient's need for assistive devices and provide as appropriate  - Encourage maximum independence but intervene and supervise when necessary  - Involve family in performance of ADLs  - Assess for home care needs following discharge   - Consider OT consult to assist with ADL evaluation and planning for discharge  - Provide patient education as appropriate  Outcome: Progressing  Goal: Maintains/Returns to pre admission functional level  Description: INTERVENTIONS:  - Perform BMAT or MOVE assessment daily    - Set and communicate daily mobility goal to care team and patient/family/caregiver     - Collaborate with rehabilitation services on mobility goals if consulted  - Perform Range of Motion  times a day  - Reposition patient every  hours    - Dangle patient  times a day  - Stand patient  times a day  - Ambulate patient  times a day  - Out of bed to chair  times a day   - Out of bed for meals  times a day  - Out of bed for toileting  - Record patient progress and toleration of activity level   Outcome: Progressing     Problem: PAIN - ADULT  Goal: Verbalizes/displays adequate comfort level or baseline comfort level  Description: Interventions:  - Encourage patient to monitor pain and request assistance  - Assess pain using appropriate pain scale  - Administer analgesics based on type and severity of pain and evaluate response  - Implement non-pharmacological measures as appropriate and evaluate response  - Consider cultural and social influences on pain and pain management  - Notify physician/advanced practitioner if interventions unsuccessful or patient reports new pain  Outcome: Progressing     Problem: INFECTION - ADULT  Goal: Absence or prevention of progression during hospitalization  Description: INTERVENTIONS:  - Assess and monitor for signs and symptoms of infection  - Monitor lab/diagnostic results  - Monitor all insertion sites, i e  indwelling lines, tubes, and drains  - Monitor endotracheal if appropriate and nasal secretions for changes in amount and color  - Clare appropriate cooling/warming therapies per order  - Administer medications as ordered  - Instruct and encourage patient and family to use good hand hygiene technique  - Identify and instruct in appropriate isolation precautions for identified infection/condition  Outcome: Progressing  Goal: Absence of fever/infection during neutropenic period  Description: INTERVENTIONS:  - Monitor WBC    Outcome: Progressing     Problem: SAFETY ADULT  Goal: Patient will remain free of falls  Description: INTERVENTIONS:  - Educate patient/family on patient safety including physical limitations  - Instruct patient to call for assistance with activity   - Consult OT/PT to assist with strengthening/mobility   - Keep Call bell within reach  - Keep bed low and locked with side rails adjusted as appropriate  - Keep care items and personal belongings within reach  - Initiate and maintain comfort rounds  - Make Fall Risk Sign visible to staff  - Offer Toileting every  Hours, in advance of need  - Initiate/Maintain alarm  - Obtain necessary fall risk management equipment:   - Apply yellow socks and bracelet for high fall risk patients  - Consider moving patient to room near nurses station  Outcome: Progressing  Goal: Maintain or return to baseline ADL function  Description: INTERVENTIONS:  -  Assess patient's ability to carry out ADLs; assess patient's baseline for ADL function and identify physical deficits which impact ability to perform ADLs (bathing, care of mouth/teeth, toileting, grooming, dressing, etc )  - Assess/evaluate cause of self-care deficits   - Assess range of motion  - Assess patient's mobility; develop plan if impaired  - Assess patient's need for assistive devices and provide as appropriate  - Encourage maximum independence but intervene and supervise when necessary  - Involve family in performance of ADLs  - Assess for home care needs following discharge   - Consider OT consult to assist with ADL evaluation and planning for discharge  - Provide patient education as appropriate  Outcome: Progressing  Goal: Maintains/Returns to pre admission functional level  Description: INTERVENTIONS:  - Perform BMAT or MOVE assessment daily    - Set and communicate daily mobility goal to care team and patient/family/caregiver  - Collaborate with rehabilitation services on mobility goals if consulted  - Perform Range of Motion  times a day  - Reposition patient every  hours    - Dangle patient  times a day  - Stand patient  times a day  - Ambulate patient  times a day  - Out of bed to chair  times a day   - Out of bed for meals times a day  - Out of bed for toileting  - Record patient progress and toleration of activity level   Outcome: Progressing     Problem: DISCHARGE PLANNING  Goal: Discharge to home or other facility with appropriate resources  Description: INTERVENTIONS:  - Identify barriers to discharge w/patient and caregiver  - Arrange for needed discharge resources and transportation as appropriate  - Identify discharge learning needs (meds, wound care, etc )  - Arrange for interpretive services to assist at discharge as needed  - Refer to Case Management Department for coordinating discharge planning if the patient needs post-hospital services based on physician/advanced practitioner order or complex needs related to functional status, cognitive ability, or social support system  Outcome: Progressing     Problem: Knowledge Deficit  Goal: Patient/family/caregiver demonstrates understanding of disease process, treatment plan, medications, and discharge instructions  Description: Complete learning assessment and assess knowledge base    Interventions:  - Provide teaching at level of understanding  - Provide teaching via preferred learning methods  Outcome: Progressing     Problem: Prexisting or High Potential for Compromised Skin Integrity  Goal: Skin integrity is maintained or improved  Description: INTERVENTIONS:  - Identify patients at risk for skin breakdown  - Assess and monitor skin integrity  - Assess and monitor nutrition and hydration status  - Monitor labs   - Assess for incontinence   - Turn and reposition patient  - Assist with mobility/ambulation  - Relieve pressure over bony prominences  - Avoid friction and shearing  - Provide appropriate hygiene as needed including keeping skin clean and dry  - Evaluate need for skin moisturizer/barrier cream  - Collaborate with interdisciplinary team   - Patient/family teaching  - Consider wound care consult   Outcome: Progressing     Problem: Nutrition/Hydration-ADULT  Goal: Nutrient/Hydration intake appropriate for improving, restoring or maintaining nutritional needs  Description: Monitor and assess patient's nutrition/hydration status for malnutrition  Collaborate with interdisciplinary team and initiate plan and interventions as ordered  Monitor patient's weight and dietary intake as ordered or per policy  Utilize nutrition screening tool and intervene as necessary  Determine patient's food preferences and provide high-protein, high-caloric foods as appropriate       INTERVENTIONS:  - Monitor oral intake, urinary output, labs, and treatment plans  - Assess nutrition and hydration status and recommend course of action  - Evaluate amount of meals eaten  - Assist patient with eating if necessary   - Allow adequate time for meals  - Recommend/ encourage appropriate diets, oral nutritional supplements, and vitamin/mineral supplements  - Order, calculate, and assess calorie counts as needed  - Recommend, monitor, and adjust tube feedings and TPN/PPN based on assessed needs  - Assess need for intravenous fluids  - Provide specific nutrition/hydration education as appropriate  - Include patient/family/caregiver in decisions related to nutrition  Outcome: Progressing

## 2022-05-06 NOTE — ASSESSMENT & PLAN NOTE
· Diabetes mellitus with steroid induced hyperglycemia  · Recently started on metformin which will be continued  · A1c elevated and needs close follow-up with PCP      Results from last 7 days   Lab Units 05/04/22  0514   HEMOGLOBIN A1C % 11 1*     Results from last 7 days   Lab Units 05/06/22  1102 05/06/22  0711 05/05/22  2109 05/05/22  1535 05/05/22  1112 05/05/22  0625 05/04/22  2046 05/04/22  1602   POC GLUCOSE mg/dl 253* 220* 277* 328* 192* 207* 208* 166*

## 2022-05-06 NOTE — DISCHARGE SUMMARY
[facility]  Discharge- Luisx Ship 8/7/1929, 80 y o  female MRN: [id number]  Unit/Bed#: E5 -01 Encounter: [id number]  Primary Care Provider: Bubba Perez MD   Date and time admitted to hospital: 5/2/2022  2:33 PM    Admitting Provider:  Ran Bowman MD  Discharge Provider:  Bill Cisneros DO  Admission Date: 5/2/2022       Discharge Date: 05/06/22   LOS: 4  Primary Care Physician at Discharge: Bubba Perez [phone]    DISCHARGE DIAGNOSES  * Chronic midline low back pain without sciatica  Assessment & Plan  68-year-old female living independently at [facility] presents with worsening low back pain found to have acute L5 fracture with severe spinal stenosis  · Case was coordinated with neuro surgery who recommends bracing and eventual follow-up  · Instantly found to have marrow abnormality at T10  Follow-up with neuro surgery scheduled  · Patient being discharged to [facility]    Hypokalemia  Assessment & Plan  · Repleted    Hyponatremia  Assessment & Plan  · Seen by nephrology  HCTZ discontinued and sodium has remained stable    Abnormal urine  Assessment & Plan  · Abnormal urinalysis with mixed contaminants on culture  · Received ceftriaxone x3 days and further discontinued    Essential hypertension  Assessment & Plan  · Blood pressure stable continue amlodipine    Type 2 diabetes mellitus, without long-term current use of insulin ([facility])  Assessment & Plan  · Diabetes mellitus with steroid induced hyperglycemia  · Recently started on metformin which will be continued  · A1c elevated and needs close follow-up with PCP      Results from last 7 days   Lab Units 05/04/22  0514   HEMOGLOBIN A1C % 11 1*     Results from last 7 days   Lab Units 05/06/22  1102 05/06/22  0711 05/05/22  2109 05/05/22  1535 05/05/22  1112 05/05/22  0625 05/04/22  2046 05/04/22  1602   POC GLUCOSE mg/dl 253* 220* 277* 328* 192* 207* 208* 166*     REASON FOR ADMISSION  Back Pain (patient reports back pain that radiates into her bilateral hips  Facility reports no falls recently and patient states pain for one week  Patient is primarily in a wheelchair except to use the bathroom, brush teeth when she stands for brief moments )    HOSPITAL COURSE:  Karo Mane is a 80 y o  female with a history of hypertension and recent diagnosis of diabetes who presented to the hospital with back pain  The patient resides at [facility] but has been having worsening pain and was brought to the hospital   Urine culture did not demonstrate infection and after three days of ceftriaxone this was discontinued  Case was coordinated with neurosurgery who recommended systemic steroids and back bracing  She had hyponatremia hypokalemia which nephrology was consulted and HCTZ was discontinued  She remains debilitated and is being discharged to [facility] for rehab  The case was discussed with son on telephone on day of discharge  Please see problem list listed above  CONSULTING PROVIDERS   IP CONSULT TO ORTHOPEDIC SURGERY  IP CONSULT TO NEPHROLOGY  IP CONSULT TO NEUROSURGERY    PROCEDURES PERFORMED  * No surgery found *    RADIOLOGY RESULTS  XR spine lumbar 2 or 3 views injury    Result Date: 5/5/2022  Impression: Study limited predominantly at the L5 level with no significant interval changes from MRI performed yesterday  Workstation performed: [id number]     MRI lumbar spine wo contrast    Result Date: 5/4/2022  Impression: 1  Acute fracture of the L5 vertebra with mild bony retropulsion of the superior endplate into the spinal canal   No epidural hematoma but there is severe spinal stenosis at the L4-5 intervertebral disc space  2   Indeterminate marrow lesion in the T10 vertebra  Both benign and malignant (solitary osseous metastases) etiologies are in the differential diagnosis  Further clinical assessment advised  3   Chronic L3 and L4 compression deformities    Correlation for osteoporotic risk factors recommended  Workstation performed: [id number]     CT abdomen pelvis with contrast    Result Date: 5/2/2022  Impression: No acute inflammatory process identified  Additional non acute findings, as described    Workstation performed: [id number]       LABS  Results from last 7 days   Lab Units 05/05/22  0432 05/03/22  0524 05/02/22  1655 05/02/22  1526   WBC Thousand/uL 7 54 9 79  --  15 31*   HEMOGLOBIN g/dL 14 5 12 3  --  16 3*   HEMATOCRIT % 42 8 37 2  --  44 3   MCV fL 96 95  --  90   PLATELETS Thousands/uL 300 238  --  322   INR   --   --  1 10  --      Results from last 7 days   Lab Units 05/05/22  0432 05/04/22  0514 05/03/22  1618 05/03/22  0524 05/02/22  1703 05/02/22  1526   SODIUM mmol/L 137 137 137 140  --  130*   POTASSIUM mmol/L 3 8 4 6 4 0 2 8*  --  2 9*   CHLORIDE mmol/L 102 104 101 108  --  91*   CO2 mmol/L 21 22 24 22  --  25   BUN mg/dL 13 11 8 9  --  16   CREATININE mg/dL 0 60 0 68 0 55* 0 63  --  0 98   CALCIUM mg/dL 8 9 9 1 9 3 6 8*  --  9 6   ALBUMIN g/dL 2 8*  --   --   --  3 4*  --    TOTAL BILIRUBIN mg/dL 0 65  --   --   --  1 09*  --    ALK PHOS U/L 70  --   --   --  84  --    ALT U/L 15  --   --   --  20  --    AST U/L 15  --   --   --  12  --    EGFR ml/min/1 73sq m 79 76 81 78  --  50   GLUCOSE RANDOM mg/dL 201* 216* 218* 179*  --  334*                      Results from last 7 days   Lab Units 05/06/22  1102 05/06/22  0711 05/05/22  2109 05/05/22  1535 05/05/22  1112 05/05/22  0625 05/04/22  2046 05/04/22  1602 05/04/22  1059 05/04/22  0741   POC GLUCOSE mg/dl 253* 220* 277* 328* 192* 207* 208* 166* 212* 215*     Results from last 7 days   Lab Units 05/04/22  0514   HEMOGLOBIN A1C % 11 1*         Results from last 7 days   Lab Units 05/02/22  1703   LACTIC ACID mmol/L 1 4           Cultures:   Results from last 7 days   Lab Units 05/02/22  1538   COLOR UA  Yellow   CLARITY UA  Clear   SPEC GRAV UA  1 025   PH UA  5 0   LEUKOCYTES UA  Elevated glucose may cause decreased leukocyte values  See urine microscopic for [facility] result/*   NITRITE UA  Negative   GLUCOSE UA mg/dl >=1000 (1%)*   KETONES UA mg/dl 15 (1+)*   BILIRUBIN UA  Interference- unable to analyze*   BLOOD UA  Negative      Results from last 7 days   Lab Units 05/02/22  1538   RBC UA /hpf 0-1*   WBC UA /hpf 10-20*   EPITHELIAL CELLS WET PREP /hpf Occasional   BACTERIA UA /hpf Innumerable*      Results from last 7 days   Lab Units 05/05/22  1135 05/02/22  1703 05/02/22  1538   BLOOD CULTURE   --  No Growth at 72 hrs  No Growth at 72 hrs   --    URINE CULTURE   --   --  <10,000 cfu/ml    INFLUENZA A PCR  Negative  --   --      Results from last 7 days   Lab Units 05/05/22  1135   SARS-COV-2  Negative   INFLUENZA A PCR  Negative   INFLUENZA B PCR  Negative   RSV PCR  Negative         DISCHARGE DAY VISIT AND PHYSICAL EXAM:  Subjective:  Patient seen examined during morning rounds  Still having some back discomfort but stable and working with PT/OT    Vitals:   Blood Pressure: 148/73 (05/06/22 0711)  Pulse: 61 (05/06/22 0711)  Temperature: 98 1 °F (36 7 °C) (05/06/22 0711)  Temp Source: Oral (05/04/22 0612)  Respirations: 18 (05/05/22 0721)  Height: 5' 2 5" (158 8 cm) (05/02/22 1440)  Weight - Scale: 58 2 kg (128 lb 6 4 oz) (05/02/22 1440)  SpO2: 94 % (05/06/22 0711)    Physical Exam  Vitals reviewed  Constitutional:       General: She is not in acute distress  Appearance: Normal appearance  Cardiovascular:      Rate and Rhythm: Regular rhythm  Pulmonary:      Breath sounds: Normal breath sounds  No wheezing  Abdominal:      General: Bowel sounds are normal       Palpations: Abdomen is soft  Tenderness: There is no guarding or rebound  Musculoskeletal:         General: Tenderness (Low back) present  No swelling  Skin:     General: Skin is warm  Neurological:      General: No focal deficit present  Mental Status: She is alert     Psychiatric:         Mood and Affect: Mood normal        Planned Re-admission: No  Discharge Disposition: Non [facility] SNF/TCU/SNU  Facility:  [facility]    Test Results Pending at Discharge:   Pending Labs     Order Current Status    Blood culture #1 Preliminary result    Blood culture #2 Preliminary result      Incidental findings:  Abnormal marrow of T10  Follow-up with Neurosurgery    Medications   · Discharge Medication List: See after visit summary for reconciled discharge medications  Diet restrictions:  Diabetic diet   Activity restrictions: No strenuous activity  Discharge Condition: stable    Outpatient Follow-Up and Discharge Instructions  See after visit summary section titled Discharge Instructions for information provided to patient and family  Code Status: Level 3 - DNAR and DNI  Discharge Statement   I spent 35 minutes discharging the patient  This time was spent on the day of discharge  Greater than 50% of total time was spent with the patient and / or family counseling and / or coordination of care  ** Please Note: This note has been constructed using a voice recognition system   **

## 2022-05-06 NOTE — ASSESSMENT & PLAN NOTE
· Abnormal urinalysis with mixed contaminants on culture    · Received ceftriaxone x3 days and further discontinued

## 2022-05-06 NOTE — CASE MANAGEMENT
Case Management Discharge Planning Note    Patient name Maria Guadalupe Arriaga  Location [facility] [address]-* MRN [id number]  : 1929 Date 2022       Current Admission Date: 2022  Current Admission Diagnosis:Chronic midline low back pain without sciatica   Patient Active Problem List    Diagnosis Date Noted    Abnormal urine 2022    Hyponatremia 2022    Hypokalemia 2022    Chronic midline low back pain without sciatica 2022    Abnormal CT scan, sinus 10/01/2019    Sinus bradycardia 10/01/2019    Closed fracture of fourth metatarsal bone 2019    Essential hypertension 2019    Premature atrial beats 2019    Type 2 diabetes mellitus with complication, with long-term current use of insulin ([facility] [address] ) 2019    Microscopic hematuria 2019    Pancreatic lesion 2019    Abnormal urine cytology 2019    Chronic renal failure 2019    Osteopenia 2019    Physical deconditioning 2018    Abnormality of gait 2018    Type 2 diabetes mellitus, without long-term current use of insulin ([facility] [address] ) 2018    Transient cerebral ischemia 12/10/2018    Hypertensive urgency 12/10/2018    Gastroesophageal reflux 12/10/2018      LOS (days): 4  Geometric Mean LOS (GMLOS) (days): 3 50  Days to GMLOS:-0 3     OBJECTIVE:  Risk of Unplanned Readmission Score: 15         Current admission status: Inpatient   Preferred Pharmacy:   [address] [place], [place] [address]  Phone: [phone] Fax: [phone]    Primary Care Provider: Jose Maria Gentile MD    Primary Insurance: MEDICARE  Secondary Insurance:     DISCHARGE DETAILS:        Transported by (Company and Unit #):  [employer]     IMM Given (Date):: 22  IMM Given to[de-identified] Family  Family notified[de-identified] Kerrie Must, son       Accepting Facility Name, [facility] : Son  Receiving Facility/Agency Phone Number: [phone]  Facility/Agency Fax Number: [phone]     Pt is medically cleared for discharge to [facility]  Transport arranged through [facility] for [facility] with [facility]  Pt, family, SNF and RN notified        IMM completed via phone with son, Ai Dan

## 2022-05-06 NOTE — RESTORATIVE TECHNICIAN NOTE
Restorative Technician Note      Patient Name: Odessa Virk     Restorative Tech Visit Date: 05/06/22  Note Type: Mobility & Bracing, Follow-up fitting  Patient Position Upon Consult: Supine  Activity Performed: Ambulated; Stood  Assistive Device: Roller walker  Brace Applied: Managed Objects  Additional Brace Ordered: No  Patient Position When Brace Applied: Seated  Education Provided: Yes  Patient Position at End of Consult: Bedside chair;  All needs within reach; Bed/Chair alarm activated

## 2022-05-06 NOTE — PLAN OF CARE
Problem: Potential for Falls  Goal: Patient will remain free of falls  Description: INTERVENTIONS:  - Educate patient/family on patient safety including physical limitations  - Instruct patient to call for assistance with activity   - Consult OT/PT to assist with strengthening/mobility   - Keep Call bell within reach  - Keep bed low and locked with side rails adjusted as appropriate  - Keep care items and personal belongings within reach  - Initiate and maintain comfort rounds  - Make Fall Risk Sign visible to staff  - Offer Toileting every  Hours, in advance of need  - Initiate/Maintain alarm  - Obtain necessary fall risk management equipment:   - Apply yellow socks and bracelet for high fall risk patients  - Consider moving patient to room near nurses station  Outcome: Progressing     Problem: MOBILITY - ADULT  Goal: Maintain or return to baseline ADL function  Description: INTERVENTIONS:  -  Assess patient's ability to carry out ADLs; assess patient's baseline for ADL function and identify physical deficits which impact ability to perform ADLs (bathing, care of mouth/teeth, toileting, grooming, dressing, etc )  - Assess/evaluate cause of self-care deficits   - Assess range of motion  - Assess patient's mobility; develop plan if impaired  - Assess patient's need for assistive devices and provide as appropriate  - Encourage maximum independence but intervene and supervise when necessary  - Involve family in performance of ADLs  - Assess for home care needs following discharge   - Consider OT consult to assist with ADL evaluation and planning for discharge  - Provide patient education as appropriate  Outcome: Progressing  Goal: Maintains/Returns to pre admission functional level  Description: INTERVENTIONS:  - Perform BMAT or MOVE assessment daily    - Set and communicate daily mobility goal to care team and patient/family/caregiver     - Collaborate with rehabilitation services on mobility goals if consulted  - Perform Range of Motion  times a day  - Reposition patient every  hours    - Dangle patient  times a day  - Stand patient  times a day  - Ambulate patient  times a day  - Out of bed to chair  times a day   - Out of bed for meals  times a day  - Out of bed for toileting  - Record patient progress and toleration of activity level   Outcome: Progressing     Problem: PAIN - ADULT  Goal: Verbalizes/displays adequate comfort level or baseline comfort level  Description: Interventions:  - Encourage patient to monitor pain and request assistance  - Assess pain using appropriate pain scale  - Administer analgesics based on type and severity of pain and evaluate response  - Implement non-pharmacological measures as appropriate and evaluate response  - Consider cultural and social influences on pain and pain management  - Notify physician/advanced practitioner if interventions unsuccessful or patient reports new pain  Outcome: Progressing     Problem: INFECTION - ADULT  Goal: Absence or prevention of progression during hospitalization  Description: INTERVENTIONS:  - Assess and monitor for signs and symptoms of infection  - Monitor lab/diagnostic results  - Monitor all insertion sites, i e  indwelling lines, tubes, and drains  - Monitor endotracheal if appropriate and nasal secretions for changes in amount and color  - [place] appropriate cooling/warming therapies per order  - Administer medications as ordered  - Instruct and encourage patient and family to use good hand hygiene technique  - Identify and instruct in appropriate isolation precautions for identified infection/condition  Outcome: Progressing  Goal: Absence of fever/infection during neutropenic period  Description: INTERVENTIONS:  - Monitor WBC    Outcome: Progressing     Problem: SAFETY ADULT  Goal: Patient will remain free of falls  Description: INTERVENTIONS:  - Educate patient/family on patient safety including physical limitations  - Instruct patient to call for assistance with activity   - Consult OT/PT to assist with strengthening/mobility   - Keep Call bell within reach  - Keep bed low and locked with side rails adjusted as appropriate  - Keep care items and personal belongings within reach  - Initiate and maintain comfort rounds  - Make Fall Risk Sign visible to staff  - Offer Toileting every  Hours, in advance of need  - Initiate/Maintain alarm  - Obtain necessary fall risk management equipment:   - Apply yellow socks and bracelet for high fall risk patients  - Consider moving patient to room near nurses station  Outcome: Progressing  Goal: Maintain or return to baseline ADL function  Description: INTERVENTIONS:  -  Assess patient's ability to carry out ADLs; assess patient's baseline for ADL function and identify physical deficits which impact ability to perform ADLs (bathing, care of mouth/teeth, toileting, grooming, dressing, etc )  - Assess/evaluate cause of self-care deficits   - Assess range of motion  - Assess patient's mobility; develop plan if impaired  - Assess patient's need for assistive devices and provide as appropriate  - Encourage maximum independence but intervene and supervise when necessary  - Involve family in performance of ADLs  - Assess for home care needs following discharge   - Consider OT consult to assist with ADL evaluation and planning for discharge  - Provide patient education as appropriate  Outcome: Progressing  Goal: Maintains/Returns to pre admission functional level  Description: INTERVENTIONS:  - Perform BMAT or MOVE assessment daily    - Set and communicate daily mobility goal to care team and patient/family/caregiver  - Collaborate with rehabilitation services on mobility goals if consulted  - Perform Range of Motion  times a day  - Reposition patient every  hours    - Dangle patient  times a day  - Stand patient  times a day  - Ambulate patient  times a day  - Out of bed to chair  times a day   - Out of bed for meals times a day  - Out of bed for toileting  - Record patient progress and toleration of activity level   Outcome: Progressing     Problem: DISCHARGE PLANNING  Goal: Discharge to home or other facility with appropriate resources  Description: INTERVENTIONS:  - Identify barriers to discharge w/patient and caregiver  - Arrange for needed discharge resources and transportation as appropriate  - Identify discharge learning needs (meds, wound care, etc )  - Arrange for interpretive services to assist at discharge as needed  - Refer to Case Management Department for coordinating discharge planning if the patient needs post-hospital services based on physician/advanced practitioner order or complex needs related to functional status, cognitive ability, or social support system  Outcome: Progressing     Problem: Knowledge Deficit  Goal: Patient/family/caregiver demonstrates understanding of disease process, treatment plan, medications, and discharge instructions  Description: Complete learning assessment and assess knowledge base    Interventions:  - Provide teaching at level of understanding  - Provide teaching via preferred learning methods  Outcome: Progressing     Problem: Prexisting or High Potential for Compromised Skin Integrity  Goal: Skin integrity is maintained or improved  Description: INTERVENTIONS:  - Identify patients at risk for skin breakdown  - Assess and monitor skin integrity  - Assess and monitor nutrition and hydration status  - Monitor labs   - Assess for incontinence   - Turn and reposition patient  - Assist with mobility/ambulation  - Relieve pressure over bony prominences  - Avoid friction and shearing  - Provide appropriate hygiene as needed including keeping skin clean and dry  - Evaluate need for skin moisturizer/barrier cream  - Collaborate with interdisciplinary team   - Patient/family teaching  - Consider wound care consult   Outcome: Progressing     Problem: Nutrition/Hydration-ADULT  Goal: Nutrient/Hydration intake appropriate for improving, restoring or maintaining nutritional needs  Description: Monitor and assess patient's nutrition/hydration status for malnutrition  Collaborate with interdisciplinary team and initiate plan and interventions as ordered  Monitor patient's weight and dietary intake as ordered or per policy  Utilize nutrition screening tool and intervene as necessary  Determine patient's food preferences and provide high-protein, high-caloric foods as appropriate       INTERVENTIONS:  - Monitor oral intake, urinary output, labs, and treatment plans  - Assess nutrition and hydration status and recommend course of action  - Evaluate amount of meals eaten  - Assist patient with eating if necessary   - Allow adequate time for meals  - Recommend/ encourage appropriate diets, oral nutritional supplements, and vitamin/mineral supplements  - Order, calculate, and assess calorie counts as needed  - Recommend, monitor, and adjust tube feedings and TPN/PPN based on assessed needs  - Assess need for intravenous fluids  - Provide specific nutrition/hydration education as appropriate  - Include patient/family/caregiver in decisions related to nutrition  Outcome: Progressing

## 2022-05-07 LAB
BACTERIA BLD CULT: NORMAL
BACTERIA BLD CULT: NORMAL

## 2022-05-09 NOTE — TELEPHONE ENCOUNTER
Call from [address] at [address] [facility] follow up appt 5/24 with MARCOS NICOLAS prior

## 2022-05-10 ENCOUNTER — TRANSITIONAL CARE MANAGEMENT (OUTPATIENT)
Dept: FAMILY MEDICINE CLINIC | Facility: CLINIC | Age: 87
End: 2022-05-10

## 2022-11-24 ENCOUNTER — HOSPITAL ENCOUNTER (EMERGENCY)
Facility: HOSPITAL | Age: 87
Discharge: HOME | End: 2022-11-24
Attending: EMERGENCY MEDICINE

## 2022-11-24 ENCOUNTER — APPOINTMENT (EMERGENCY)
Dept: CT IMAGING | Facility: HOSPITAL | Age: 87
End: 2022-11-24

## 2022-11-24 ENCOUNTER — APPOINTMENT (EMERGENCY)
Dept: RADIOLOGY | Facility: HOSPITAL | Age: 87
End: 2022-11-24

## 2022-11-24 VITALS
RESPIRATION RATE: 18 BRPM | OXYGEN SATURATION: 96 % | SYSTOLIC BLOOD PRESSURE: 167 MMHG | HEART RATE: 70 BPM | DIASTOLIC BLOOD PRESSURE: 74 MMHG | TEMPERATURE: 98.8 F
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
## 2022-11-24 DIAGNOSIS — S09.90XA INJURY OF HEAD, INITIAL ENCOUNTER: ICD-10-CM

## 2022-11-24 DIAGNOSIS — M79.671 BILATERAL FOOT PAIN: ICD-10-CM

## 2022-11-24 DIAGNOSIS — R91.1 NODULE OF RIGHT LUNG: ICD-10-CM

## 2022-11-24 DIAGNOSIS — S01.81XA FACIAL LACERATION, INITIAL ENCOUNTER: Primary | ICD-10-CM

## 2022-11-24 DIAGNOSIS — M79.672 BILATERAL FOOT PAIN: ICD-10-CM

## 2022-11-24 RX ORDER — LIDOCAINE HYDROCHLORIDE AND EPINEPHRINE 10; 10 MG/ML; UG/ML
5 INJECTION, SOLUTION INFILTRATION; PERINEURAL ONCE
Status: COMPLETED | OUTPATIENT
Start: 2022-11-24 | End: 2022-11-24

## 2022-11-24 RX ADMIN — LIDOCAINE HYDROCHLORIDE,EPINEPHRINE BITARTRATE 5 ML: 10; .01 INJECTION, SOLUTION INFILTRATION; PERINEURAL at 21:03

## 2022-11-25 NOTE — DISCHARGE INSTRUCTIONS
FOLLOW UP WITH YOUR GENERAL DOCTOR IN 7-10 DAY FOR SUTURE REMOVAL  Also follow up with your general doctor about the incidental finding of lung nodule found on CT scan  Please follow up with Podiatry (information below) for bilateral foot pain and follow up after fall  Please return to the Emergency Department if you experience worsening of your current symptoms, worsening pain, uncontrollable bleeding, or any other concerning symptoms

## 2022-11-25 NOTE — ED ATTENDING ATTESTATION
11/24/2022  I, Leopoldo Cong, DO, saw and evaluated the patient  I have discussed the patient with the resident/non-physician practitioner and agree with the resident's/non-physician practitioner's findings, Plan of Care, and MDM as documented in the resident's/non-physician practitioner's note, except where noted  All available labs and Radiology studies were reviewed  I was present for key portions of any procedure(s) performed by the resident/non-physician practitioner and I was immediately available to provide assistance  At this point I agree with the current assessment done in the Emergency Department  I have conducted an independent evaluation of this patient a history and physical is as follows:    Patient is a 60-year-old female coming in today after fall  Patient has a history of diabetes, hypertension, hyperlipidemia as well as lumbar radiculopathy coming in today after a fall  Patient was singing here wheelchair and her feet got caught and she fell forward striking her head  Patient on exam has a small laceration to the right eyebrow region/periorbital   Alert oriented x3, cranial nerves 2-12 grossly intact  Patient maintaining airway and secretions  Uvula midline without edema  No facial asymmetry  No tongue deviation  Regular rate and rhythm  Lungs clear to auscultation  There is no evidence of external trauma throughout the anterior cervical spine, chest, abdomen pelvis as well as cervical thoracolumbar spine  No step-offs or deformities  No tenderness to palpation throughout the cervical thoracolumbar spine  Patient can move bilateral upper and lower extremities with functional active range of motion with manual muscle grade 4/5  She does have tenderness to the left great toe with bruising  She does have moderate to severe hallux bilateral feet  CTs of head and C-spine were negative with noted lung nodule  She only this follow-up as outpatient    X-rays show diffuse osteopenia as well as arthritic changes with hallux  There is no obvious fracture deformity  Reviewed laceration repair  Patient will be discharged with return to ER instructions    Disclosure: Voice to text software was used in the preparation of this document and could have resulted in translational errors  Occasional wrong word or "sound a like" substitutions may have occurred due to the inherent limitations of voice recognition software  Read the chart carefully and recognize, using context, where substitutions have occurred         ED Course         Critical Care Time  Procedures

## 2022-11-25 NOTE — ED PROVIDER NOTES
History  Chief Complaint   Patient presents with   • Leonidas Deal down 2 steps while sitting in wheel chair  +head strike  Laceration to forehead  +thinners  Alert and oriented  HPI   Patient is a 80year old female who presents to the ED for evaluation of fall with head strike approximately 1 hour prior to arrival   Patient is currently taking 81 mg aspirin daily and Plavix 75 mg daily  Patient states she was at her nephew's house sitting in her wheelchair when she accidentally began to slide forward and fell down 2 stairs hitting her face on the counter in front of her and catching her feet underneath herself with a wheelchair  Patient reports she was unable to control the bleeding and her family called 911 due to the fall  Patient states she currently has a mild headache and pain to her bilateral feet  Patient denies any other bodily trauma or other complaints of pain at this time  Prior to Admission Medications   Prescriptions Last Dose Informant Patient Reported? Taking?    Petrolatum-Zinc Oxide (PHYTOPLEX Z-GUARD) 57-17 % PSTE   Yes No   Sig: Apply topically   acetaminophen (TYLENOL) 325 mg tablet   Yes No   Sig: Take 325 mg by mouth every 6 (six) hours as needed for mild pain   amLODIPine (NORVASC) 5 mg tablet   No No   Sig: Take 1 tablet (5 mg total) by mouth daily after dinner   aspirin 81 mg chewable tablet   No No   Sig: Chew 1 tablet (81 mg total) daily   atorvastatin (LIPITOR) 40 mg tablet   No No   Sig: Take 1 tablet (40 mg total) by mouth every evening   bisacodyl (DULCOLAX) 5 mg EC tablet   Yes No   Sig: Take 10 mg by mouth daily as needed for constipation   clopidogrel (PLAVIX) 75 mg tablet   No No   Sig: Take 1 tablet (75 mg total) by mouth daily   metFORMIN (GLUCOPHAGE) 500 mg tablet   Yes No   Sig: Take 500 mg by mouth 2 (two) times a day with meals   methylPREDNISolone 4 MG tablet therapy pack   No No   Sig: Use as directed on package   pantoprazole (PROTONIX) 20 mg tablet   No No Sig: Take 1 tablet (20 mg total) by mouth daily      Facility-Administered Medications: None       Past Medical History:   Diagnosis Date   • Abnormal gait    • Chronic renal failure    • Diabetes mellitus (HCC)    • GERD (gastroesophageal reflux disease)    • H  pylori infection    • Hypertension    • Hypertensive urgency    • Pancreatic lesion    • Premature atrial beats    • Renal disorder    • TIA (transient ischemic attack)        Past Surgical History:   Procedure Laterality Date   • CHOLECYSTECTOMY         Family History   Problem Relation Age of Onset   • Hypertension Father      I have reviewed and agree with the history as documented  E-Cigarette/Vaping     E-Cigarette/Vaping Substances     Social History     Tobacco Use   • Smoking status: Never   • Smokeless tobacco: Never   Substance Use Topics   • Alcohol use: No   • Drug use: No        Review of Systems   Constitutional: Negative for chills and fever  HENT: Negative for ear pain and sore throat  Eyes: Negative for pain and visual disturbance  Respiratory: Negative for cough and shortness of breath  Cardiovascular: Negative for chest pain and palpitations  Gastrointestinal: Negative for abdominal pain and vomiting  Genitourinary: Negative for dysuria and hematuria  Musculoskeletal: Positive for arthralgias  Negative for back pain  Skin: Positive for wound  Negative for color change and rash  Neurological: Negative for seizures and syncope  All other systems reviewed and are negative        Physical Exam  ED Triage Vitals [11/24/22 1835]   Temperature Pulse Respirations Blood Pressure SpO2   98 8 °F (37 1 °C) 84 18 (!) 205/130 98 %      Temp src Heart Rate Source Patient Position - Orthostatic VS BP Location FiO2 (%)   -- Monitor Lying Right arm --      Pain Score       No Pain             Orthostatic Vital Signs  Vitals:    11/24/22 1835 11/24/22 1845   BP: (!) 205/130 (!) 180/81   Pulse: 84 82   Patient Position - Orthostatic VS: Lying Lying       Physical Exam  Vitals and nursing note reviewed  Constitutional:       General: She is not in acute distress  Appearance: She is well-developed  HENT:      Head: Normocephalic  Laceration (2 5 cm laceration to medial right eyebrow) present  Right Ear: Tympanic membrane, ear canal and external ear normal       Left Ear: Tympanic membrane, ear canal and external ear normal       Mouth/Throat:      Mouth: Mucous membranes are moist       Pharynx: Oropharynx is clear  Comments: Tongue and uvula midline  Eyes:      Extraocular Movements: Extraocular movements intact  Conjunctiva/sclera: Conjunctivae normal       Pupils: Pupils are equal, round, and reactive to light  Cardiovascular:      Rate and Rhythm: Normal rate and regular rhythm  Pulses: Normal pulses  Heart sounds: Normal heart sounds  No murmur heard  Pulmonary:      Effort: Pulmonary effort is normal  No respiratory distress  Breath sounds: Normal breath sounds  Chest:      Chest wall: No tenderness  Abdominal:      Palpations: Abdomen is soft  Tenderness: There is no abdominal tenderness  There is no guarding or rebound  Musculoskeletal:         General: No swelling  Cervical back: Normal range of motion and neck supple  No rigidity or tenderness  Right foot: Decreased range of motion (Secondary to pain)  Tenderness (First MTP, midfoot) present  No deformity  Normal pulse  Left foot: Decreased range of motion ( secondary to pain)  Tenderness (Great toe with overlying ecchymosis) present  No deformity  Normal pulse  Skin:     General: Skin is warm and dry  Capillary Refill: Capillary refill takes less than 2 seconds  Findings: No bruising  Neurological:      General: No focal deficit present  Mental Status: She is alert and oriented to person, place, and time  Mental status is at baseline     Psychiatric:         Mood and Affect: Mood normal  Behavior: Behavior normal          ED Medications  Medications   lidocaine-epinephrine (XYLOCAINE/EPINEPHRINE) 1 %-1:100,000 injection 5 mL (has no administration in time range)       Diagnostic Studies  Results Reviewed     None                 CT head without contrast   Final Result by Adele Quiles MD (11/24 1929)      No acute intracranial abnormality  Chronic microangiopathic changes  Workstation performed: [id number]         CT cervical spine without contrast   Final Result by Adele Quiles MD (11/24 1935)      1  No cervical spine fracture or traumatic malalignment  2   5 mm right upper lobe lung nodule  Based on current Fleischner Society 2017 Guidelines on incidental pulmonary nodule, no routine follow-up is needed if the patient is low risk  If the patient is high risk, optional follow-up chest CT at 12 months    can be considered  Considerations related to the patient's age and/or comorbidities may be used to alter these recommendations  Workstation performed: [id number]         XR foot 3+ views RIGHT    (Results Pending)   XR foot 3+ views LEFT    (Results Pending)         Procedures  Laceration repair    Date/Time: 11/24/2022 8:10 PM  Performed by: Brina Cain DO  Authorized by: Brina Cain DO   Consent: Verbal consent obtained  Risks and benefits: risks, benefits and alternatives were discussed  Consent given by: patient  Patient understanding: patient states understanding of the procedure being performed  Patient consent: the patient's understanding of the procedure matches consent given  Procedure consent: procedure consent matches procedure scheduled  Radiology Images displayed and confirmed   If images not available, report reviewed: imaging studies available  Required items: required blood products, implants, devices, and special equipment available  Patient identity confirmed: verbally with patient and arm band  Body area: head/neck  Location details: right eyebrow  Laceration length: 2 5 cm  Foreign bodies: no foreign bodies  Tendon involvement: none  Nerve involvement: none  Vascular damage: no  Anesthesia: local infiltration    Anesthesia:  Local Anesthetic: lidocaine 1% with epinephrine  Anesthetic total: 5 mL      Procedure Details:  Preparation: Patient was prepped and draped in the usual sterile fashion  Irrigation solution: saline  Irrigation method: syringe  Amount of cleaning: standard  Debridement: none  Degree of undermining: none  Skin closure: 6-0 Prolene  Number of sutures: 4  Technique: simple  Approximation: close  Approximation difficulty: simple  Patient tolerance: patient tolerated the procedure well with no immediate complications            ED Course  ED Course as of 11/24/22 2216   Thu Nov 24, 2022 1930 XR foot 3+ views LEFT  No acute fracture or dislocation noted   1931 XR foot 3+ views RIGHT  No acute fracture or dislocation noted   1959 CT head without contrast  No acute bleed noted   1959 CT cervical spine without contrast  No fracture noted, incidental finding of lung nodule discussed with patient   2057 Patient's family now at bedside, discussed results of imaging, treatment in the ED, and need to follow up once back to facility  All questions answered  SBIRT 22yo+    Flowsheet Row Most Recent Value   SBIRT (25 yo +)    In order to provide better care to our patients, we are screening all of our patients for alcohol and drug use  Would it be okay to ask you these screening questions?  Unable to answer at this time Filed at: 11/24/2022 1836                [facility]  Number of Diagnoses or Management Options  Bilateral foot pain  Facial laceration, initial encounter  Fall, initial encounter  Injury of head, initial encounter  Nodule of right lung  Diagnosis management comments: Patient is a 80year old female who presents to the ED for evaluation of fall with head strike approximately 1 hour prior to arrival      CT head and neck without contrast, x-ray bilateral feet ordered  Patient is declining treatment with pain medication at this time  See ED course for additional details  Laceration repair as above  Discussed results and plan with patient and family at bedside  Advised on need for outpatient follow up, given information  Given return precautions verbally and in discharge instructions  Advised to follow up with PCP in 7-10 days for suture removal  All questions answered  Patient and family expressed verbal understanding and are agreeable with plan for discharge with outpatient follow up  Disposition  Final diagnoses:   Facial laceration, initial encounter   Injury of head, initial encounter   Fall, initial encounter   Bilateral foot pain     Time reflects when diagnosis was documented in both MDM as applicable and the Disposition within this note     Time User Action Codes Description Comment    11/24/2022  8:43 PM Isabelle Alvarez Add [S01 81XA] Facial laceration, initial encounter     11/24/2022  8:44 PM DePope, Cushing Add [S09 [id number]] Injury of head, initial encounter     11/24/2022  8:44 PM Isabelle Brightpson Valentinay  TBKT] Fall, initial encounter     11/24/2022  8:44 PM Isabelle Beverly [R08 [id number],  [id number]] Bilateral foot pain       ED Disposition     ED Disposition   Discharge    Condition   Stable    Date/Time   Th Nov 24, 2022 2043    Comment   Josy Zuleta HLPABP discharge to SNF care                 Follow-up Information     Follow up With Specialties Details Why Contact Info Additional Information    Infolink  Call   Matt Johnston  Family Medicine  As needed, For suture removal [phone]  [facility] [address], [address] [facility] Schedule an appointment as soon as possible for a visit   [address] [phone]  [address] [facility] [address], [facility] [address], [place], [place], [address] [phone]          Patient's Medications   Discharge Prescriptions    No medications on file     No discharge procedures on file  PDMP Review       Value Time User    PDMP Reviewed  Yes 5/6/2022 12:57 PM Marimar Ovalles DO           ED Provider  Attending physically available and evaluated Milli Oneal I managed the patient along with the ED Attending      Electronically Signed by         Edson Velazquez DO  11/24/22 2217

## 2023-01-03 NOTE — ASSESSMENT & PLAN NOTE
Patient was noted to have impaired mobility and balance and self-care during the acute hospitalization  She was admitted to the acute inpatient rehabilitation to address these issues  At home, she is normally able to negotiate a full flight of stairs independently and walks independently with a walker  She currently needs cues for safety and gait quality  Will proceed with a comprehensive rehabilitation program to improve gait quality and balance and safety  Eucrisa Counseling: Patient may experience a mild burning sensation during topical application. Eucrisa is not approved in children less than 3 months of age.

## 2023-01-30 ENCOUNTER — OFFICE VISIT (OUTPATIENT)
Dept: PODIATRY | Facility: CLINIC | Age: 88
End: 2023-01-30

## 2023-01-30 VITALS — HEIGHT: 63 IN | BODY MASS INDEX: 22.68 KG/M2 | WEIGHT: 128 LBS
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
## 2023-01-30 DIAGNOSIS — E11.69 TYPE 2 DIABETES MELLITUS WITH OTHER SPECIFIED COMPLICATION, WITHOUT LONG-TERM CURRENT USE OF INSULIN (HCC): ICD-10-CM

## 2023-01-30 NOTE — PROGRESS NOTES
Assessment/Plan:       Diagnoses and all orders for this visit:    Fall, initial encounter    Type 2 diabetes mellitus with other specified complication, without long-term current use of insulin ([facility] [address] )      Diagnosis and options discussed with patient  Patient agreeable to the plan as stated below    I reviewed her visit to the emergency department November 24, 2022  X-rays were taken of both feet  I personally showed these images to the patient  There is history of previous metatarsal fracture 2 through 5 on the left but these are healed  These fractures occurred in 2019  There are no acute findings on the patient's x-ray and clinically she has no pain on exam today  Patient is diabetic foot risk is low     -Educated on DM risk to lower extremities, proper shoe gear, and daily monitoring of feet    -Educated on A1C and the risks of poorly controlled Diabetes  Reviewed recent A1C, 11 1 5/4/2022  -Discussed weight loss and suitable exercise regiment  Reviewed most recent ED visit 11/24/2022      Subjective:      Patient ID: Gayle Mercer is a 80 y o  female  PAtient presents to the office today from her nursing home  Patient fell November 24  In the emergency department she had a small laceration to her head which was treated  She had also reported foot pain at the time  X-rays were taken  It was recommended she follow-up to ensure her foot pain resolved  Patient states she personally has no concerns with her feet today  She denies any pain  A podiatrist recently trimmed her toenails at the nursing home  The following portions of the patient's history were reviewed and updated as appropriate: allergies, current medications, past family history, past medical history, past social history, past surgical history and problem list     Review of Systems   Constitutional: Negative  Respiratory: Negative for shortness of breath  Musculoskeletal: Negative for arthralgias     Skin: Negative for color change  Neurological: Negative for numbness  Objective:      Ht 5' 2 5" (1 588 m)   Wt 58 1 kg (128 lb)   BMI 23 04 kg/m²          Physical Exam  Vitals reviewed  Cardiovascular:      Pulses: no weak pulses          Dorsalis pedis pulses are 2+ on the right side and 2+ on the left side  Posterior tibial pulses are 2+ on the right side and 2+ on the left side  Musculoskeletal:        Feet:    Feet:      Right foot:      Protective Sensation: 10 sites tested  10 sites sensed  Skin integrity: No ulcer, skin breakdown, erythema, warmth, callus or dry skin  Toenail Condition: Right toenails are normal       Left foot:      Protective Sensation: 10 sites tested  10 sites sensed  Skin integrity: No ulcer, skin breakdown, erythema, warmth, callus or dry skin  Toenail Condition: Left toenails are normal    Skin:     Capillary Refill: Capillary refill takes less than 2 seconds  Findings: No erythema or rash  Neurological:      Gait: Gait abnormal (Limited ambulatory function)  Diabetic Foot Exam    Patient's shoes and socks removed  Right Foot/Ankle   Right Foot Inspection  Skin Exam: skin normal and skin intact  No dry skin, no warmth, no callus, no erythema, no maceration, no abnormal color, no pre-ulcer, no ulcer and no callus  Toe Exam: right toe deformity  No swelling, no tenderness and erythema    Sensory   Vibration: intact  Monofilament testing: intact    Vascular  The right DP pulse is 2+  The right PT pulse is 2+  Left Foot/Ankle  Left Foot Inspection  Skin Exam: skin normal and skin intact  No dry skin, no warmth, no erythema, no maceration, normal color, no pre-ulcer, no ulcer and no callus  Toe Exam: left toe deformity  No swelling, no tenderness and no erythema  Sensory   Vibration: intact  Monofilament testing: intact    Vascular  The left DP pulse is 2+  The left PT pulse is 2+       Assign Risk Category  Deformity present  No loss of protective sensation  No weak pulses  Risk: 0

## 2023-02-28 NOTE — ASSESSMENT & PLAN NOTE
· Blood pressure stable continue amlodipine Rifampin Pregnancy And Lactation Text: This medication is Pregnancy Category C and it isn't know if it is safe during pregnancy. It is also excreted in breast milk and should not be used if you are breast feeding.